# Patient Record
Sex: FEMALE | Race: WHITE | Employment: OTHER | URBAN - METROPOLITAN AREA
[De-identification: names, ages, dates, MRNs, and addresses within clinical notes are randomized per-mention and may not be internally consistent; named-entity substitution may affect disease eponyms.]

---

## 2017-01-19 ENCOUNTER — TELEPHONE (OUTPATIENT)
Dept: FAMILY MEDICINE CLINIC | Age: 72
End: 2017-01-19

## 2017-01-19 DIAGNOSIS — E11.9 CONTROLLED TYPE 2 DIABETES MELLITUS WITHOUT COMPLICATION, WITHOUT LONG-TERM CURRENT USE OF INSULIN (HCC): ICD-10-CM

## 2017-02-24 ENCOUNTER — TELEPHONE (OUTPATIENT)
Dept: FAMILY MEDICINE CLINIC | Age: 72
End: 2017-02-24

## 2017-02-24 RX ORDER — METHYLPREDNISOLONE 4 MG/1
TABLET ORAL
Qty: 1 KIT | Refills: 0 | Status: SHIPPED | OUTPATIENT
Start: 2017-02-24 | End: 2017-03-02

## 2017-03-09 DIAGNOSIS — E11.9 CONTROLLED TYPE 2 DIABETES MELLITUS WITHOUT COMPLICATION, WITHOUT LONG-TERM CURRENT USE OF INSULIN (HCC): ICD-10-CM

## 2017-03-09 RX ORDER — MELOXICAM 15 MG/1
TABLET ORAL
Qty: 90 TABLET | Refills: 1 | Status: SHIPPED | OUTPATIENT
Start: 2017-03-09 | End: 2017-11-06 | Stop reason: SDUPTHER

## 2017-03-09 RX ORDER — MONTELUKAST SODIUM 10 MG/1
TABLET ORAL
Qty: 90 TABLET | Refills: 1 | Status: SHIPPED | OUTPATIENT
Start: 2017-03-09 | End: 2017-11-06 | Stop reason: SDUPTHER

## 2017-03-09 RX ORDER — CHLORTHALIDONE 25 MG/1
TABLET ORAL
Qty: 90 TABLET | Refills: 1 | Status: SHIPPED | OUTPATIENT
Start: 2017-03-09 | End: 2017-09-25

## 2017-03-09 RX ORDER — PRAVASTATIN SODIUM 10 MG
TABLET ORAL
Qty: 90 TABLET | Refills: 1 | Status: SHIPPED | OUTPATIENT
Start: 2017-03-09 | End: 2017-08-08 | Stop reason: SDUPTHER

## 2017-04-28 ENCOUNTER — TELEPHONE (OUTPATIENT)
Dept: FAMILY MEDICINE CLINIC | Age: 72
End: 2017-04-28

## 2017-04-28 DIAGNOSIS — E11.9 CONTROLLED TYPE 2 DIABETES MELLITUS WITHOUT COMPLICATION, WITHOUT LONG-TERM CURRENT USE OF INSULIN (HCC): Primary | ICD-10-CM

## 2017-04-28 DIAGNOSIS — I10 BENIGN ESSENTIAL HYPERTENSION: ICD-10-CM

## 2017-04-28 DIAGNOSIS — E78.00 PURE HYPERCHOLESTEROLEMIA: ICD-10-CM

## 2017-05-11 ENCOUNTER — TELEPHONE (OUTPATIENT)
Dept: FAMILY MEDICINE CLINIC | Age: 72
End: 2017-05-11

## 2017-05-11 DIAGNOSIS — E11.9 CONTROLLED TYPE 2 DIABETES MELLITUS WITHOUT COMPLICATION, WITHOUT LONG-TERM CURRENT USE OF INSULIN (HCC): ICD-10-CM

## 2017-05-15 ENCOUNTER — NURSE ONLY (OUTPATIENT)
Dept: FAMILY MEDICINE CLINIC | Age: 72
End: 2017-05-15

## 2017-05-15 DIAGNOSIS — E11.9 CONTROLLED TYPE 2 DIABETES MELLITUS WITHOUT COMPLICATION, WITHOUT LONG-TERM CURRENT USE OF INSULIN (HCC): ICD-10-CM

## 2017-05-15 DIAGNOSIS — I10 BENIGN ESSENTIAL HYPERTENSION: ICD-10-CM

## 2017-05-15 DIAGNOSIS — E78.00 PURE HYPERCHOLESTEROLEMIA: ICD-10-CM

## 2017-05-15 LAB
CHOLESTEROL, TOTAL: 196 MG/DL (ref 0–199)
GLUCOSE BLD-MCNC: 114 MG/DL (ref 70–99)
HCT VFR BLD CALC: 41.9 % (ref 36–48)
HDLC SERPL-MCNC: 70 MG/DL (ref 40–60)
HEMOGLOBIN: 13.9 G/DL (ref 12–16)
LDL CHOLESTEROL CALCULATED: 99 MG/DL
MCH RBC QN AUTO: 30.6 PG (ref 26–34)
MCHC RBC AUTO-ENTMCNC: 33.2 G/DL (ref 31–36)
MCV RBC AUTO: 92.4 FL (ref 80–100)
PDW BLD-RTO: 12.8 % (ref 12.4–15.4)
PLATELET # BLD: 265 K/UL (ref 135–450)
PMV BLD AUTO: 9.5 FL (ref 5–10.5)
RBC # BLD: 4.53 M/UL (ref 4–5.2)
TRIGL SERPL-MCNC: 137 MG/DL (ref 0–150)
VITAMIN B-12: 291 PG/ML (ref 211–911)
VLDLC SERPL CALC-MCNC: 27 MG/DL
WBC # BLD: 5.2 K/UL (ref 4–11)

## 2017-05-15 PROCEDURE — 36415 COLL VENOUS BLD VENIPUNCTURE: CPT | Performed by: FAMILY MEDICINE

## 2017-05-16 LAB
ESTIMATED AVERAGE GLUCOSE: 137 MG/DL
HBA1C MFR BLD: 6.4 %

## 2017-05-23 ENCOUNTER — OFFICE VISIT (OUTPATIENT)
Dept: FAMILY MEDICINE CLINIC | Age: 72
End: 2017-05-23

## 2017-05-23 VITALS
DIASTOLIC BLOOD PRESSURE: 72 MMHG | TEMPERATURE: 98.1 F | BODY MASS INDEX: 25.81 KG/M2 | SYSTOLIC BLOOD PRESSURE: 134 MMHG | WEIGHT: 151.2 LBS | OXYGEN SATURATION: 98 % | HEIGHT: 64 IN | HEART RATE: 77 BPM

## 2017-05-23 DIAGNOSIS — K40.90 RIGHT INGUINAL HERNIA: ICD-10-CM

## 2017-05-23 DIAGNOSIS — R19.7 DIARRHEA, UNSPECIFIED TYPE: ICD-10-CM

## 2017-05-23 DIAGNOSIS — I10 BENIGN ESSENTIAL HYPERTENSION: ICD-10-CM

## 2017-05-23 DIAGNOSIS — K21.9 GASTROESOPHAGEAL REFLUX DISEASE WITHOUT ESOPHAGITIS: ICD-10-CM

## 2017-05-23 DIAGNOSIS — E11.9 CONTROLLED TYPE 2 DIABETES MELLITUS WITHOUT COMPLICATION, WITHOUT LONG-TERM CURRENT USE OF INSULIN (HCC): Primary | ICD-10-CM

## 2017-05-23 DIAGNOSIS — K58.0 IRRITABLE BOWEL SYNDROME WITH DIARRHEA: ICD-10-CM

## 2017-05-23 DIAGNOSIS — Z12.11 COLON CANCER SCREENING: ICD-10-CM

## 2017-05-23 DIAGNOSIS — R42 VERTIGO: ICD-10-CM

## 2017-05-23 LAB
CREATININE URINE POCT: 50
MICROALBUMIN/CREAT 24H UR: 10 MG/G{CREAT}
MICROALBUMIN/CREAT UR-RTO: NORMAL

## 2017-05-23 PROCEDURE — 1036F TOBACCO NON-USER: CPT | Performed by: FAMILY MEDICINE

## 2017-05-23 PROCEDURE — 1090F PRES/ABSN URINE INCON ASSESS: CPT | Performed by: FAMILY MEDICINE

## 2017-05-23 PROCEDURE — 3044F HG A1C LEVEL LT 7.0%: CPT | Performed by: FAMILY MEDICINE

## 2017-05-23 PROCEDURE — 1123F ACP DISCUSS/DSCN MKR DOCD: CPT | Performed by: FAMILY MEDICINE

## 2017-05-23 PROCEDURE — G8399 PT W/DXA RESULTS DOCUMENT: HCPCS | Performed by: FAMILY MEDICINE

## 2017-05-23 PROCEDURE — 82044 UR ALBUMIN SEMIQUANTITATIVE: CPT | Performed by: FAMILY MEDICINE

## 2017-05-23 PROCEDURE — 3014F SCREEN MAMMO DOC REV: CPT | Performed by: FAMILY MEDICINE

## 2017-05-23 PROCEDURE — G8427 DOCREV CUR MEDS BY ELIG CLIN: HCPCS | Performed by: FAMILY MEDICINE

## 2017-05-23 PROCEDURE — 4040F PNEUMOC VAC/ADMIN/RCVD: CPT | Performed by: FAMILY MEDICINE

## 2017-05-23 PROCEDURE — 3017F COLORECTAL CA SCREEN DOC REV: CPT | Performed by: FAMILY MEDICINE

## 2017-05-23 PROCEDURE — G8420 CALC BMI NORM PARAMETERS: HCPCS | Performed by: FAMILY MEDICINE

## 2017-05-23 PROCEDURE — 99214 OFFICE O/P EST MOD 30 MIN: CPT | Performed by: FAMILY MEDICINE

## 2017-05-23 RX ORDER — METFORMIN HYDROCHLORIDE 500 MG/1
500 TABLET, EXTENDED RELEASE ORAL 2 TIMES DAILY
Qty: 60 TABLET | Refills: 5 | Status: SHIPPED | OUTPATIENT
Start: 2017-05-23 | End: 2017-06-06 | Stop reason: SDUPTHER

## 2017-06-06 RX ORDER — METFORMIN HYDROCHLORIDE 500 MG/1
500 TABLET, EXTENDED RELEASE ORAL 2 TIMES DAILY
Qty: 180 TABLET | Refills: 3 | Status: SHIPPED | OUTPATIENT
Start: 2017-06-06 | End: 2018-02-23 | Stop reason: SDUPTHER

## 2017-06-07 ENCOUNTER — TELEPHONE (OUTPATIENT)
Dept: FAMILY MEDICINE CLINIC | Age: 72
End: 2017-06-07

## 2017-06-08 ENCOUNTER — INITIAL CONSULT (OUTPATIENT)
Dept: SURGERY | Age: 72
End: 2017-06-08

## 2017-06-08 VITALS
SYSTOLIC BLOOD PRESSURE: 126 MMHG | WEIGHT: 151 LBS | BODY MASS INDEX: 25.78 KG/M2 | HEIGHT: 64 IN | DIASTOLIC BLOOD PRESSURE: 70 MMHG

## 2017-06-08 DIAGNOSIS — K40.90 RIGHT INGUINAL HERNIA: Primary | ICD-10-CM

## 2017-06-08 PROCEDURE — 1036F TOBACCO NON-USER: CPT | Performed by: SURGERY

## 2017-06-08 PROCEDURE — 4040F PNEUMOC VAC/ADMIN/RCVD: CPT | Performed by: SURGERY

## 2017-06-08 PROCEDURE — 3014F SCREEN MAMMO DOC REV: CPT | Performed by: SURGERY

## 2017-06-08 PROCEDURE — 1123F ACP DISCUSS/DSCN MKR DOCD: CPT | Performed by: SURGERY

## 2017-06-08 PROCEDURE — G8427 DOCREV CUR MEDS BY ELIG CLIN: HCPCS | Performed by: SURGERY

## 2017-06-08 PROCEDURE — 1090F PRES/ABSN URINE INCON ASSESS: CPT | Performed by: SURGERY

## 2017-06-08 PROCEDURE — 99202 OFFICE O/P NEW SF 15 MIN: CPT | Performed by: SURGERY

## 2017-06-08 PROCEDURE — G8399 PT W/DXA RESULTS DOCUMENT: HCPCS | Performed by: SURGERY

## 2017-06-08 PROCEDURE — 3017F COLORECTAL CA SCREEN DOC REV: CPT | Performed by: SURGERY

## 2017-06-08 PROCEDURE — G8420 CALC BMI NORM PARAMETERS: HCPCS | Performed by: SURGERY

## 2017-08-03 ENCOUNTER — HOSPITAL ENCOUNTER (OUTPATIENT)
Dept: OTHER | Age: 72
Discharge: HOME | End: 2017-08-03
Attending: INTERNAL MEDICINE | Admitting: INTERNAL MEDICINE

## 2017-08-03 VITALS
WEIGHT: 148 LBS | OXYGEN SATURATION: 96 % | RESPIRATION RATE: 14 BRPM | TEMPERATURE: 97.2 F | BODY MASS INDEX: 26.22 KG/M2 | HEIGHT: 63 IN | HEART RATE: 60 BPM | DIASTOLIC BLOOD PRESSURE: 63 MMHG | SYSTOLIC BLOOD PRESSURE: 124 MMHG

## 2017-08-03 LAB
GLUCOSE BLD-MCNC: 120 MG/DL (ref 70–99)
PERFORMED ON: ABNORMAL

## 2017-08-03 RX ORDER — SODIUM CHLORIDE, SODIUM LACTATE, POTASSIUM CHLORIDE, CALCIUM CHLORIDE 600; 310; 30; 20 MG/100ML; MG/100ML; MG/100ML; MG/100ML
INJECTION, SOLUTION INTRAVENOUS CONTINUOUS
Status: DISCONTINUED | OUTPATIENT
Start: 2017-08-03 | End: 2017-08-04 | Stop reason: HOSPADM

## 2017-08-03 RX ADMIN — SODIUM CHLORIDE, SODIUM LACTATE, POTASSIUM CHLORIDE, CALCIUM CHLORIDE: 600; 310; 30; 20 INJECTION, SOLUTION INTRAVENOUS at 06:49

## 2017-08-08 RX ORDER — PRAVASTATIN SODIUM 10 MG
TABLET ORAL
Qty: 90 TABLET | Refills: 3 | Status: SHIPPED | OUTPATIENT
Start: 2017-08-08 | End: 2017-10-05 | Stop reason: SDUPTHER

## 2017-09-12 ENCOUNTER — TELEPHONE (OUTPATIENT)
Dept: FAMILY MEDICINE CLINIC | Age: 72
End: 2017-09-12

## 2017-09-13 ENCOUNTER — NURSE ONLY (OUTPATIENT)
Dept: FAMILY MEDICINE CLINIC | Age: 72
End: 2017-09-13

## 2017-09-13 DIAGNOSIS — E11.9 CONTROLLED TYPE 2 DIABETES MELLITUS WITHOUT COMPLICATION, WITHOUT LONG-TERM CURRENT USE OF INSULIN (HCC): ICD-10-CM

## 2017-09-13 LAB
A/G RATIO: 2.1 (ref 1.1–2.2)
ALBUMIN SERPL-MCNC: 4.5 G/DL (ref 3.4–5)
ALP BLD-CCNC: 79 U/L (ref 40–129)
ALT SERPL-CCNC: 11 U/L (ref 10–40)
ANION GAP SERPL CALCULATED.3IONS-SCNC: 18 MMOL/L (ref 3–16)
AST SERPL-CCNC: 12 U/L (ref 15–37)
BASOPHILS ABSOLUTE: 0.1 K/UL (ref 0–0.2)
BASOPHILS RELATIVE PERCENT: 1.2 %
BILIRUB SERPL-MCNC: 0.4 MG/DL (ref 0–1)
BUN BLDV-MCNC: 22 MG/DL (ref 7–20)
CALCIUM SERPL-MCNC: 9.7 MG/DL (ref 8.3–10.6)
CHLORIDE BLD-SCNC: 100 MMOL/L (ref 99–110)
CO2: 24 MMOL/L (ref 21–32)
CREAT SERPL-MCNC: 0.6 MG/DL (ref 0.6–1.2)
EOSINOPHILS ABSOLUTE: 0.2 K/UL (ref 0–0.6)
EOSINOPHILS RELATIVE PERCENT: 3 %
GFR AFRICAN AMERICAN: >60
GFR NON-AFRICAN AMERICAN: >60
GLOBULIN: 2.1 G/DL
GLUCOSE BLD-MCNC: 115 MG/DL (ref 70–99)
HCT VFR BLD CALC: 42.2 % (ref 36–48)
HEMOGLOBIN: 14 G/DL (ref 12–16)
LYMPHOCYTES ABSOLUTE: 1.5 K/UL (ref 1–5.1)
LYMPHOCYTES RELATIVE PERCENT: 28.5 %
MCH RBC QN AUTO: 31.2 PG (ref 26–34)
MCHC RBC AUTO-ENTMCNC: 33.3 G/DL (ref 31–36)
MCV RBC AUTO: 93.8 FL (ref 80–100)
MONOCYTES ABSOLUTE: 0.4 K/UL (ref 0–1.3)
MONOCYTES RELATIVE PERCENT: 7.6 %
NEUTROPHILS ABSOLUTE: 3.1 K/UL (ref 1.7–7.7)
NEUTROPHILS RELATIVE PERCENT: 59.7 %
PDW BLD-RTO: 13.4 % (ref 12.4–15.4)
PLATELET # BLD: 258 K/UL (ref 135–450)
PMV BLD AUTO: 9.5 FL (ref 5–10.5)
POTASSIUM SERPL-SCNC: 4.5 MMOL/L (ref 3.5–5.1)
RBC # BLD: 4.5 M/UL (ref 4–5.2)
SODIUM BLD-SCNC: 142 MMOL/L (ref 136–145)
TOTAL PROTEIN: 6.6 G/DL (ref 6.4–8.2)
VITAMIN B-12: 302 PG/ML (ref 211–911)
WBC # BLD: 5.1 K/UL (ref 4–11)

## 2017-09-13 PROCEDURE — 36415 COLL VENOUS BLD VENIPUNCTURE: CPT | Performed by: FAMILY MEDICINE

## 2017-09-14 LAB
ESTIMATED AVERAGE GLUCOSE: 128.4 MG/DL
HBA1C MFR BLD: 6.1 %

## 2017-09-20 ENCOUNTER — OFFICE VISIT (OUTPATIENT)
Dept: FAMILY MEDICINE CLINIC | Age: 72
End: 2017-09-20

## 2017-09-20 VITALS
HEART RATE: 66 BPM | SYSTOLIC BLOOD PRESSURE: 120 MMHG | BODY MASS INDEX: 27.14 KG/M2 | TEMPERATURE: 98 F | DIASTOLIC BLOOD PRESSURE: 74 MMHG | WEIGHT: 153.2 LBS | OXYGEN SATURATION: 98 %

## 2017-09-20 DIAGNOSIS — K21.9 GASTROESOPHAGEAL REFLUX DISEASE WITHOUT ESOPHAGITIS: ICD-10-CM

## 2017-09-20 DIAGNOSIS — M15.9 PRIMARY OSTEOARTHRITIS INVOLVING MULTIPLE JOINTS: ICD-10-CM

## 2017-09-20 DIAGNOSIS — E11.9 CONTROLLED TYPE 2 DIABETES MELLITUS WITHOUT COMPLICATION, WITHOUT LONG-TERM CURRENT USE OF INSULIN (HCC): Primary | ICD-10-CM

## 2017-09-20 DIAGNOSIS — I10 BENIGN ESSENTIAL HYPERTENSION: ICD-10-CM

## 2017-09-20 DIAGNOSIS — K58.0 IRRITABLE BOWEL SYNDROME WITH DIARRHEA: ICD-10-CM

## 2017-09-20 PROCEDURE — 1123F ACP DISCUSS/DSCN MKR DOCD: CPT | Performed by: FAMILY MEDICINE

## 2017-09-20 PROCEDURE — 93000 ELECTROCARDIOGRAM COMPLETE: CPT | Performed by: FAMILY MEDICINE

## 2017-09-20 PROCEDURE — 3017F COLORECTAL CA SCREEN DOC REV: CPT | Performed by: FAMILY MEDICINE

## 2017-09-20 PROCEDURE — G8427 DOCREV CUR MEDS BY ELIG CLIN: HCPCS | Performed by: FAMILY MEDICINE

## 2017-09-20 PROCEDURE — 3046F HEMOGLOBIN A1C LEVEL >9.0%: CPT | Performed by: FAMILY MEDICINE

## 2017-09-20 PROCEDURE — 3014F SCREEN MAMMO DOC REV: CPT | Performed by: FAMILY MEDICINE

## 2017-09-20 PROCEDURE — 99215 OFFICE O/P EST HI 40 MIN: CPT | Performed by: FAMILY MEDICINE

## 2017-09-20 PROCEDURE — 4040F PNEUMOC VAC/ADMIN/RCVD: CPT | Performed by: FAMILY MEDICINE

## 2017-09-20 PROCEDURE — G8399 PT W/DXA RESULTS DOCUMENT: HCPCS | Performed by: FAMILY MEDICINE

## 2017-09-20 PROCEDURE — G8417 CALC BMI ABV UP PARAM F/U: HCPCS | Performed by: FAMILY MEDICINE

## 2017-09-20 PROCEDURE — 1036F TOBACCO NON-USER: CPT | Performed by: FAMILY MEDICINE

## 2017-09-20 PROCEDURE — 1090F PRES/ABSN URINE INCON ASSESS: CPT | Performed by: FAMILY MEDICINE

## 2017-09-25 RX ORDER — BUDESONIDE AND FORMOTEROL FUMARATE DIHYDRATE 160; 4.5 UG/1; UG/1
AEROSOL RESPIRATORY (INHALATION)
Qty: 30.6 G | Refills: 1 | Status: SHIPPED | OUTPATIENT
Start: 2017-09-25 | End: 2018-10-30 | Stop reason: SDUPTHER

## 2017-09-25 RX ORDER — CHLORTHALIDONE 25 MG/1
TABLET ORAL
Qty: 90 TABLET | Refills: 1 | Status: SHIPPED | OUTPATIENT
Start: 2017-09-25 | End: 2018-04-13

## 2017-10-06 RX ORDER — PRAVASTATIN SODIUM 10 MG
TABLET ORAL
Qty: 90 TABLET | Refills: 0 | Status: SHIPPED | OUTPATIENT
Start: 2017-10-06 | End: 2018-07-09 | Stop reason: SDUPTHER

## 2017-10-10 ENCOUNTER — SURG/PROC ORDERS (OUTPATIENT)
Dept: SURGERY | Age: 72
End: 2017-10-10

## 2017-10-10 RX ORDER — SODIUM CHLORIDE 0.9 % (FLUSH) 0.9 %
10 SYRINGE (ML) INJECTION PRN
Status: CANCELLED | OUTPATIENT
Start: 2017-10-10

## 2017-10-10 RX ORDER — HEPARIN SODIUM 5000 [USP'U]/ML
5000 INJECTION, SOLUTION INTRAVENOUS; SUBCUTANEOUS ONCE
Status: CANCELLED | OUTPATIENT
Start: 2017-10-10

## 2017-10-10 RX ORDER — SODIUM CHLORIDE 0.9 % (FLUSH) 0.9 %
10 SYRINGE (ML) INJECTION EVERY 12 HOURS SCHEDULED
Status: CANCELLED | OUTPATIENT
Start: 2017-10-10

## 2017-10-17 ENCOUNTER — HOSPITAL ENCOUNTER (OUTPATIENT)
Dept: SURGERY | Age: 72
Discharge: HOME | End: 2017-10-17
Attending: SURGERY | Admitting: SURGERY

## 2017-10-17 VITALS
WEIGHT: 150 LBS | TEMPERATURE: 97.8 F | OXYGEN SATURATION: 96 % | DIASTOLIC BLOOD PRESSURE: 60 MMHG | RESPIRATION RATE: 12 BRPM | BODY MASS INDEX: 26.58 KG/M2 | SYSTOLIC BLOOD PRESSURE: 124 MMHG | HEIGHT: 63 IN | HEART RATE: 65 BPM

## 2017-10-17 LAB
GLUCOSE BLD-MCNC: 113 MG/DL (ref 70–99)
PERFORMED ON: ABNORMAL

## 2017-10-17 PROCEDURE — 49650 LAP ING HERNIA REPAIR INIT: CPT | Performed by: SURGERY

## 2017-10-17 RX ORDER — LIDOCAINE HYDROCHLORIDE 10 MG/ML
1 INJECTION, SOLUTION EPIDURAL; INFILTRATION; INTRACAUDAL; PERINEURAL
Status: COMPLETED | OUTPATIENT
Start: 2017-10-17 | End: 2017-10-17

## 2017-10-17 RX ORDER — DIPHENHYDRAMINE HYDROCHLORIDE 50 MG/ML
12.5 INJECTION INTRAMUSCULAR; INTRAVENOUS
Status: ACTIVE | OUTPATIENT
Start: 2017-10-17 | End: 2017-10-17

## 2017-10-17 RX ORDER — HYDRALAZINE HYDROCHLORIDE 20 MG/ML
5 INJECTION INTRAMUSCULAR; INTRAVENOUS EVERY 10 MIN PRN
Status: DISCONTINUED | OUTPATIENT
Start: 2017-10-17 | End: 2017-10-18 | Stop reason: HOSPADM

## 2017-10-17 RX ORDER — HYDROMORPHONE HCL 110MG/55ML
0.25 PATIENT CONTROLLED ANALGESIA SYRINGE INTRAVENOUS EVERY 5 MIN PRN
Status: DISCONTINUED | OUTPATIENT
Start: 2017-10-17 | End: 2017-10-18 | Stop reason: HOSPADM

## 2017-10-17 RX ORDER — LABETALOL HYDROCHLORIDE 5 MG/ML
5 INJECTION, SOLUTION INTRAVENOUS EVERY 10 MIN PRN
Status: DISCONTINUED | OUTPATIENT
Start: 2017-10-17 | End: 2017-10-18 | Stop reason: HOSPADM

## 2017-10-17 RX ORDER — SODIUM CHLORIDE 0.9 % (FLUSH) 0.9 %
10 SYRINGE (ML) INJECTION PRN
Status: DISCONTINUED | OUTPATIENT
Start: 2017-10-17 | End: 2017-10-18 | Stop reason: HOSPADM

## 2017-10-17 RX ORDER — HYDROCODONE BITARTRATE AND ACETAMINOPHEN 5; 325 MG/1; MG/1
1-2 TABLET ORAL EVERY 6 HOURS PRN
Qty: 30 TABLET | Refills: 0 | Status: SHIPPED | OUTPATIENT
Start: 2017-10-17 | End: 2017-10-24

## 2017-10-17 RX ORDER — FENTANYL CITRATE 50 UG/ML
25 INJECTION, SOLUTION INTRAMUSCULAR; INTRAVENOUS EVERY 5 MIN PRN
Status: DISCONTINUED | OUTPATIENT
Start: 2017-10-17 | End: 2017-10-18 | Stop reason: HOSPADM

## 2017-10-17 RX ORDER — ONDANSETRON 2 MG/ML
4 INJECTION INTRAMUSCULAR; INTRAVENOUS
Status: COMPLETED | OUTPATIENT
Start: 2017-10-17 | End: 2017-10-17

## 2017-10-17 RX ORDER — SODIUM CHLORIDE 0.9 % (FLUSH) 0.9 %
10 SYRINGE (ML) INJECTION EVERY 12 HOURS SCHEDULED
Status: DISCONTINUED | OUTPATIENT
Start: 2017-10-17 | End: 2017-10-18 | Stop reason: HOSPADM

## 2017-10-17 RX ORDER — MEPERIDINE HYDROCHLORIDE 25 MG/ML
12.5 INJECTION INTRAMUSCULAR; INTRAVENOUS; SUBCUTANEOUS EVERY 5 MIN PRN
Status: DISCONTINUED | OUTPATIENT
Start: 2017-10-17 | End: 2017-10-18 | Stop reason: HOSPADM

## 2017-10-17 RX ORDER — SODIUM CHLORIDE, SODIUM LACTATE, POTASSIUM CHLORIDE, CALCIUM CHLORIDE 600; 310; 30; 20 MG/100ML; MG/100ML; MG/100ML; MG/100ML
INJECTION, SOLUTION INTRAVENOUS CONTINUOUS
Status: DISCONTINUED | OUTPATIENT
Start: 2017-10-17 | End: 2017-10-18 | Stop reason: HOSPADM

## 2017-10-17 RX ORDER — HEPARIN SODIUM 5000 [USP'U]/ML
INJECTION, SOLUTION INTRAVENOUS; SUBCUTANEOUS
Status: DISPENSED
Start: 2017-10-17 | End: 2017-10-17

## 2017-10-17 RX ORDER — OXYCODONE HYDROCHLORIDE AND ACETAMINOPHEN 5; 325 MG/1; MG/1
1 TABLET ORAL ONCE
Status: COMPLETED | OUTPATIENT
Start: 2017-10-17 | End: 2017-10-17

## 2017-10-17 RX ORDER — LIDOCAINE HYDROCHLORIDE 10 MG/ML
INJECTION, SOLUTION EPIDURAL; INFILTRATION; INTRACAUDAL; PERINEURAL
Status: DISPENSED
Start: 2017-10-17 | End: 2017-10-17

## 2017-10-17 RX ORDER — HYDROMORPHONE HCL 110MG/55ML
0.5 PATIENT CONTROLLED ANALGESIA SYRINGE INTRAVENOUS EVERY 5 MIN PRN
Status: DISCONTINUED | OUTPATIENT
Start: 2017-10-17 | End: 2017-10-18 | Stop reason: HOSPADM

## 2017-10-17 RX ORDER — SODIUM CHLORIDE, SODIUM LACTATE, POTASSIUM CHLORIDE, CALCIUM CHLORIDE 600; 310; 30; 20 MG/100ML; MG/100ML; MG/100ML; MG/100ML
INJECTION, SOLUTION INTRAVENOUS
Status: DISPENSED
Start: 2017-10-17 | End: 2017-10-17

## 2017-10-17 RX ORDER — PROMETHAZINE HYDROCHLORIDE 25 MG/ML
6.25 INJECTION, SOLUTION INTRAMUSCULAR; INTRAVENOUS
Status: ACTIVE | OUTPATIENT
Start: 2017-10-17 | End: 2017-10-17

## 2017-10-17 RX ORDER — HEPARIN SODIUM 5000 [USP'U]/ML
5000 INJECTION, SOLUTION INTRAVENOUS; SUBCUTANEOUS ONCE
Status: COMPLETED | OUTPATIENT
Start: 2017-10-17 | End: 2017-10-17

## 2017-10-17 RX ORDER — DEXTROSE MONOHYDRATE 50 MG/ML
INJECTION, SOLUTION INTRAVENOUS
Status: DISPENSED
Start: 2017-10-17 | End: 2017-10-17

## 2017-10-17 RX ADMIN — Medication 0.5 MG: at 13:54

## 2017-10-17 RX ADMIN — LIDOCAINE HYDROCHLORIDE 0.1 ML: 10 INJECTION, SOLUTION EPIDURAL; INFILTRATION; INTRACAUDAL; PERINEURAL at 11:45

## 2017-10-17 RX ADMIN — OXYCODONE HYDROCHLORIDE AND ACETAMINOPHEN 1 TABLET: 5; 325 TABLET ORAL at 14:52

## 2017-10-17 RX ADMIN — ONDANSETRON 4 MG: 2 INJECTION INTRAMUSCULAR; INTRAVENOUS at 13:54

## 2017-10-17 RX ADMIN — Medication 0.5 MG: at 14:06

## 2017-10-17 RX ADMIN — HEPARIN SODIUM 5000 UNITS: 5000 INJECTION, SOLUTION INTRAVENOUS; SUBCUTANEOUS at 11:43

## 2017-10-17 RX ADMIN — Medication 0.5 MG: at 13:59

## 2017-10-17 ASSESSMENT — PAIN DESCRIPTION - DESCRIPTORS
DESCRIPTORS: DISCOMFORT
DESCRIPTORS: SORE
DESCRIPTORS: SORE
DESCRIPTORS: DISCOMFORT
DESCRIPTORS: SORE
DESCRIPTORS: DISCOMFORT

## 2017-10-17 ASSESSMENT — PAIN SCALES - GENERAL
PAINLEVEL_OUTOF10: 6
PAINLEVEL_OUTOF10: 8
PAINLEVEL_OUTOF10: 7
PAINLEVEL_OUTOF10: 9
PAINLEVEL_OUTOF10: 4
PAINLEVEL_OUTOF10: 5

## 2017-10-17 ASSESSMENT — PAIN DESCRIPTION - PAIN TYPE
TYPE: SURGICAL PAIN

## 2017-10-17 ASSESSMENT — PAIN DESCRIPTION - LOCATION
LOCATION: ABDOMEN

## 2017-10-17 ASSESSMENT — PAIN - FUNCTIONAL ASSESSMENT: PAIN_FUNCTIONAL_ASSESSMENT: 0-10

## 2017-10-17 NOTE — ANESTHESIA POST-OP
ANESTHESIA POST-OPERATIVE NOTE    Patient Name: eHver Reese  YOB: 1945  Medical Record Number: [id number]  Account Number: [de-identified]    Date of Surgery: 10/17/2017    Preoperative Diagnosis: RIGHT INGUINAL HERNIA  Surgical Procedure: robotic right inguinal hernia repair  Surgeon: Renetta Sierra MD    Anesthesia type: general  Patient location: PACU    Vitals:    10/17/17 1341 10/17/17 1346 10/17/17 1351 10/17/17 1406   BP: 122/61 (!) 123/53 119/77 (!) 120/52   Pulse: 75 74 71 68   Resp: 15 11 13 13   Temp:       TempSrc:       SpO2: 95% 98% 95% 97%   Weight:       Height:            Post-op vital signs: stable  Please refer to nursing notes for full set of vitals while in PACU. Level of consciousness: awake, alert and oriented  Post-op assessment: no apparent anesthetic complications, tolerated procedure well and no evidence of recall    Cardiovascular System Stable: yes  Respiratory System: Airway Patent yes  Ventilator and/or ETT: no    Post-op pain: adequate analgesia  Post-op Nausea & Vomiting: no nausea and no vomiting  Post-op Hydration: euvolemic    The patient is on the ventilator and/or sedated therefore unable participate in the post-operative evaluation: no    The patient has a nerve block in place and full recovery from regional anesthesia has not occurred. Patient is not expected to recover within 48 hours of this evaluation. However, he/she is otherwise able to participate in this evaluation: no    Jose Phase I & II: please see nursing notes for this information.     Complications: none  Comments: none    Electronically signed by Eugene López MD on 10/17/17 at 2:28 PM

## 2017-10-17 NOTE — H&P
I have reviewed the history and physical dated September/20/ 2017 and examined the patient and find no relevant changes. I have reviewed with the patient and/or family the risks, benefits, and alternatives to the procedure.

## 2017-10-17 NOTE — PLAN OF CARE
Pre-Operative:  1. Patient/Caregiver identifies - states name and date of birth. 2.  The patient is free from signs and symptoms of injury. 3.  The patient receives appropriate medication(s), safely administered during the Perioperative period. 4.  The patient is free from signs and symptoms of infection. 5.  The patient has wound / tissue perfusion. 6.  The patients's fluid, electrolyte, and acid-base balances are established preoperatively. 7.  The patient's pulmonary function is established preoperatively. 8.  The patient's cardiovascular status is established preoperatively. 9.  The patient / caregiver demonstrates knowledge of nutritional management related to the operative or other invasive procedure. 10. The patient/caregiver demonstrates knowledge of medication management. 11. The patient/caregiver demonstrates knowledge of pain management. 12.  The patient participates in the rehabilitation process as applicable. 13.  The patient/caregiver participates in decisions affection his or her Perioperative plan of care. 14.  The patient's care is consistent with the individualized Perioperative plan of care. 15.  The patient's right to privacy is maintained. 16.  The patient is the recipient of competent and ethical care within legal standards of practice. 17.  The patient's value system, lifestyle, ethnicity, and culture are considered, respected, and incorporated in the Perioperative plan of care and understands special services available. 18.  The patient demonstrates and/or reports adequate pain control throughout the the Perioperative period. 19. The patient's neurological status is established preoperatively. 20. The patient/caregiver demonstrates knowledge of the expected responses to the operative or invasive procedure. 21.  Patient/Caregiver has reduced anxiety. Interventions- Familiarize with environment and equipment.   22. Patient/Caregiver verbalizes understanding of Phase I and Phase II process. 23.  Patient pain level is established preoperatively using age appropriate pain scale. 24.  The patient will move to fall risk upon sedation- during and through the recovery phase. Interventions- orient the patient to the environment, especially the location of the bathroom; provide treaded socks/non-skid footwear; demonstrate and teach back use of the nurse's call system; instruct the patient to call for help before getting out of bed; lock all movable equipment before transferring patient; keep bed in lowest position possible.  25.  Other:

## 2017-10-17 NOTE — ANESTHESIA PRE-OP
ANESTHESIA PRE-OPERATIVE EVALUATION    Patient Name: Star Horan YOB: 1945   Sex: Female Age: 81 yrs     Medical Record Number: [id number] Acct Number: [de-identified]     Date of Procedure: 10/17/2017 Time of Procedure: 1200     Preoperative Diagnosis: RIGHT INGUINAL HERNIA   Procedure: robotic right inguinal hernia repair   Surgeon: Bhargav Tran MD     Allergies: Allergies   Allergen Reactions    Flagyl [Metronidazole]      hives    Daypro [Oxaprozin]     Sulfa Antibiotics     Tramadol      drowsy    Amlodipine      constipation    Lisinopril      Cough        NPO: >8 hours  IV Access:  PIV  Isolation: No active isolations       Vitals:    10/17/17 1038   BP: (!) 158/74   Pulse: 66   Resp: 20   Temp: 99 °F (37.2 °C)   SpO2: 97%      Height: Height: 5' 3\" (160 cm) Weight: Weight: 150 lb (68 kg) BMI: Body mass index is 26.57 kg/m². Basic Metabolic Profile  Lab Results   Component Value Date     09/13/2017    K 4.5 09/13/2017     09/13/2017    CO2 24 09/13/2017    BUN 22 09/13/2017    CREATININE 0.6 09/13/2017    GLUCOSE 115 09/13/2017       Complete Blood Count  Lab Results   Component Value Date    WBC 5.1 09/13/2017    HGB 14.0 09/13/2017    HGB 13.9 05/15/2017    HCT 42.2 09/13/2017    HCT 41.9 05/15/2017     09/13/2017       EKG: Sinus bradycardia    Home Medications:  Patient's Medications   New Prescriptions    No medications on file   Previous Medications    ACETAMINOPHEN (TYLENOL PO)    Take  by mouth.     ALBUTEROL (ACCUNEB) 1.25 MG/3ML NEBULIZER SOLUTION    Inhale 3 mLs into the lungs every 4 hours as needed for Wheezing or Shortness of Breath DX: J45.20    ALBUTEROL (PROVENTIL HFA) 108 (90 BASE) MCG/ACT INHALER    Inhale 1-2 puffs into the lungs every 4 hours as needed for Wheezing or Shortness of Breath    CHLORTHALIDONE (HYGROTON) 25 MG TABLET    TAKE 1 TABLET BY MOUTH  DAILY    DICYCLOMINE (BENTYL) 10 MG CAPSULE    Take 1-2 capsules by mouth 3 times daily (before meals)    FLUTICASONE (FLONASE) 50 MCG/ACT NASAL SPRAY    1-2 sprays by Nasal route nightly. MELOXICAM (MOBIC) 15 MG TABLET    Take 1 tablet by mouth  daily    METFORMIN (GLUCOPHAGE XR) 500 MG EXTENDED RELEASE TABLET    Take 1 tablet by mouth 2 times daily    MONTELUKAST (SINGULAIR) 10 MG TABLET    Take 1 tablet by mouth  nightly    PRAVASTATIN (PRAVACHOL) 10 MG TABLET    TAKE 1 TABLET DAILY    RESPIRATORY THERAPY SUPPLIES (NEBULIZER) JOVITA    Use as directed DX: J45.20    SPACER/AERO-HOLDING CHAMBERS JOVITA    by Does not apply route. SYMBICORT 160-4.5 MCG/ACT AERO    USE 2 PUFFS TWO TIMES DAILY    VITAMIN D (CHOLECALCIFEROL) 1000 UNITS CAPS CAPSULE    Take 2,000 Units by mouth daily. Modified Medications    No medications on file   Discontinued Medications    ASPIRIN 81 MG TABLET    Take 1 tablet by mouth daily    TRIAMCINOLONE (KENALOG) 0.1 % CREAM    Apply twice a day as needed. Do not use on the face.      Medications taken on the morning of surgery: symbicort    Past Medical History  Past Medical History:   Diagnosis Date    Allergic rhinoconjunctivitis     Benign essential hypertension     Chest pain NEC 7/04    negative GXT/cardiolite    Depression     1997    GERD (gastroesophageal reflux disease)     Hyperglycemia     Hyperlipidemia     --HDL , ratio 2.7    IBS (irritable bowel syndrome)     Mild intermittent asthma     Osteoarthritis     Osteopenia     Routine health maintenance     GYN--TAC(6/07)    Vitamin D deficiency        Active Problem List  Patient Active Problem List    Diagnosis Date Noted    Gastroesophageal reflux disease without esophagitis 10/12/2015     Priority: High    Moderate intermittent asthma without complication 42/00/7453     Priority: High; used inhaler this AM    Benign essential hypertension      Priority: High    Controlled type 2 diabetes mellitus without complication, without long-term current use of insulin (HCC)      Priority: Medium Extremities: no peripheral edema   ______________________________________________________________________________________  Anesthetic Plan     ASA Status: 2 Anesthesia Type: general   Difficult Airway: No PONV History: No   Full Stomach: No Nerve Block: No   Code Status: Full Code Advanced Directives: Not Received   Emergent: No Notes: The anesthetic plan was discussed with the patient and/or patient representative. All questions were answered. Risks and possible complications were explained. Complications include, but are not limited to, sore throat, dental injury or damage, trauma to soft tissue, eye injury, corneal abrasion, adverse reaction to blood products and/or medications, bleeding, nerve injury, cardiac or respiratory arrest, and death. The patient and patients family understands these complications and are agreeable to the anesthetic plan. Nursing staff present in room during this discussion and consent. Informed consent obtained verbally from the patient.     Electronically signed by Prachi Kumar - 10/17/2017 at 11:56 AM

## 2017-10-17 NOTE — OP NOTE
[address]                [address] [id number]                               OPERATIVE REPORT    PATIENT NAME: Snow Mayfield                          :             1945  MED REC NO:   [id number]                           ROOM:  ACCOUNT NO:   [de-identified]                           ADMISSION DATE:  10/17/2017  PROVIDER:     Juan Barrera MD    DATE OF PROCEDURE:  10/17/2017    PREOPERATIVE DIAGNOSIS:  Right inguinal hernia. POSTOPERATIVE DIAGNOSIS:  Right inguinal hernia. PROCEDURE:  Robotic right inguinal hernia repair with mesh. SURGEON:  Juan Barrera MD    ANESTHESIA:  General.    INDICATIONS:  The patient is a 70-year-old woman who presents with a  right inguinal hernia. She presents for repair. OPERATIVE SUMMARY:  After preoperative evaluation, the patient was  brought in the operating suite and placed in a comfortable supine  position on the operating room table. Monitoring equipment was  attached and general anesthesia was induced. Her abdomen was  sterilely prepped and draped, and a small incision was made at the  superior aspect of the umbilicus. This was dissected down to the  fascia and a suture of 0 Ethibond was placed on either side of the  midline. The midline fascia was opened and the peritoneal cavity was  entered directly. A figure-of-eight suture of 0 Vicryl was placed  across the fascial defect for closure later. A Quoc trocar was  inserted and the abdomen was insufflated to a pressure of 15 mmHg. The remaining ports were placed under direct internal visualization. She was placed in Trendelenburg and the robot was docked. The hernia  was identified. She had few adhesions in the right lower quadrant and  these were taken down with electrocautery. The peritoneum was opened  and the preperitoneal space was entered. This was dissected down and  the epigastric vessels were identified.   We identified the

## 2017-10-17 NOTE — PROGRESS NOTES
Assessment unchanged. States pain is down to a 3. Tolerating po fluids. Instructions reviewed with patient and . Patient discharged to home with  via wheelchair.

## 2017-10-31 ENCOUNTER — OFFICE VISIT (OUTPATIENT)
Dept: SURGERY | Age: 72
End: 2017-10-31

## 2017-10-31 VITALS
BODY MASS INDEX: 26.93 KG/M2 | SYSTOLIC BLOOD PRESSURE: 120 MMHG | HEIGHT: 63 IN | WEIGHT: 152 LBS | DIASTOLIC BLOOD PRESSURE: 76 MMHG

## 2017-10-31 DIAGNOSIS — Z98.890 POST-OPERATIVE STATE: Primary | ICD-10-CM

## 2017-10-31 PROCEDURE — 99024 POSTOP FOLLOW-UP VISIT: CPT | Performed by: SURGERY

## 2017-10-31 NOTE — PROGRESS NOTES
[facility] GENERAL SURGERY      S:   Patient presents s/p robotic right inguinal hernia repair. She reports doing well. O:   Comfortable         Incision sites healing well. A:   S/P robotic right inguinal hernia repair    P:   Follow up as needed.

## 2017-11-07 RX ORDER — MELOXICAM 15 MG/1
TABLET ORAL
Qty: 90 TABLET | Refills: 1 | Status: SHIPPED | OUTPATIENT
Start: 2017-11-07 | End: 2018-02-23 | Stop reason: SDUPTHER

## 2017-11-07 RX ORDER — MONTELUKAST SODIUM 10 MG/1
TABLET ORAL
Qty: 90 TABLET | Refills: 1 | Status: SHIPPED | OUTPATIENT
Start: 2017-11-07 | End: 2018-02-23 | Stop reason: SDUPTHER

## 2017-11-30 ENCOUNTER — OFFICE VISIT (OUTPATIENT)
Dept: FAMILY MEDICINE CLINIC | Age: 72
End: 2017-11-30

## 2017-11-30 VITALS
HEART RATE: 72 BPM | BODY MASS INDEX: 27.5 KG/M2 | SYSTOLIC BLOOD PRESSURE: 136 MMHG | OXYGEN SATURATION: 98 % | DIASTOLIC BLOOD PRESSURE: 72 MMHG | WEIGHT: 155.2 LBS | TEMPERATURE: 98.2 F

## 2017-11-30 DIAGNOSIS — M15.9 PRIMARY OSTEOARTHRITIS INVOLVING MULTIPLE JOINTS: ICD-10-CM

## 2017-11-30 DIAGNOSIS — R19.7 DIARRHEA, UNSPECIFIED TYPE: Primary | ICD-10-CM

## 2017-11-30 PROCEDURE — 3017F COLORECTAL CA SCREEN DOC REV: CPT | Performed by: FAMILY MEDICINE

## 2017-11-30 PROCEDURE — 4040F PNEUMOC VAC/ADMIN/RCVD: CPT | Performed by: FAMILY MEDICINE

## 2017-11-30 PROCEDURE — 99213 OFFICE O/P EST LOW 20 MIN: CPT | Performed by: FAMILY MEDICINE

## 2017-11-30 PROCEDURE — 1036F TOBACCO NON-USER: CPT | Performed by: FAMILY MEDICINE

## 2017-11-30 PROCEDURE — 1123F ACP DISCUSS/DSCN MKR DOCD: CPT | Performed by: FAMILY MEDICINE

## 2017-11-30 PROCEDURE — G8484 FLU IMMUNIZE NO ADMIN: HCPCS | Performed by: FAMILY MEDICINE

## 2017-11-30 PROCEDURE — G8427 DOCREV CUR MEDS BY ELIG CLIN: HCPCS | Performed by: FAMILY MEDICINE

## 2017-11-30 PROCEDURE — 1090F PRES/ABSN URINE INCON ASSESS: CPT | Performed by: FAMILY MEDICINE

## 2017-11-30 PROCEDURE — G8399 PT W/DXA RESULTS DOCUMENT: HCPCS | Performed by: FAMILY MEDICINE

## 2017-11-30 PROCEDURE — 3014F SCREEN MAMMO DOC REV: CPT | Performed by: FAMILY MEDICINE

## 2017-11-30 PROCEDURE — G8417 CALC BMI ABV UP PARAM F/U: HCPCS | Performed by: FAMILY MEDICINE

## 2017-11-30 RX ORDER — AMITRIPTYLINE HYDROCHLORIDE 25 MG/1
25-75 TABLET, FILM COATED ORAL NIGHTLY
Qty: 60 TABLET | Refills: 3 | Status: SHIPPED | OUTPATIENT
Start: 2017-11-30 | End: 2019-05-01

## 2017-11-30 NOTE — PATIENT INSTRUCTIONS
Please compare this printed medication list with your medications at home to be sure they are the same. If you have any medications that are different please contact us immediately at [phone]. Also review your allergies that we have listed, these may also include medications that you have not been able to tolerate, make sure everything listed is correct. If you have any allergies that are different please contact us immediately at [phone].

## 2018-01-31 ENCOUNTER — TELEPHONE (OUTPATIENT)
Dept: FAMILY MEDICINE CLINIC | Age: 73
End: 2018-01-31

## 2018-01-31 RX ORDER — PREDNISONE 20 MG/1
20 TABLET ORAL 2 TIMES DAILY
Qty: 10 TABLET | Refills: 0 | Status: SHIPPED | OUTPATIENT
Start: 2018-01-31 | End: 2018-02-05

## 2018-02-26 RX ORDER — MELOXICAM 15 MG/1
TABLET ORAL
Qty: 90 TABLET | Refills: 1 | Status: SHIPPED | OUTPATIENT
Start: 2018-02-26 | End: 2018-10-30 | Stop reason: SDUPTHER

## 2018-02-26 RX ORDER — MONTELUKAST SODIUM 10 MG/1
TABLET ORAL
Qty: 90 TABLET | Refills: 1 | Status: SHIPPED | OUTPATIENT
Start: 2018-02-26 | End: 2018-10-30 | Stop reason: SDUPTHER

## 2018-02-26 RX ORDER — METFORMIN HYDROCHLORIDE 500 MG/1
TABLET, EXTENDED RELEASE ORAL
Qty: 180 TABLET | Refills: 1 | Status: SHIPPED | OUTPATIENT
Start: 2018-02-26 | End: 2018-07-09 | Stop reason: SDUPTHER

## 2018-04-13 RX ORDER — CHLORTHALIDONE 25 MG/1
TABLET ORAL
Qty: 90 TABLET | Refills: 1 | Status: SHIPPED | OUTPATIENT
Start: 2018-04-13 | End: 2018-07-09 | Stop reason: SDUPTHER

## 2018-04-18 ENCOUNTER — OFFICE VISIT (OUTPATIENT)
Dept: FAMILY MEDICINE CLINIC | Age: 73
End: 2018-04-18

## 2018-04-18 VITALS
WEIGHT: 156.6 LBS | DIASTOLIC BLOOD PRESSURE: 62 MMHG | HEART RATE: 64 BPM | BODY MASS INDEX: 27.75 KG/M2 | OXYGEN SATURATION: 96 % | TEMPERATURE: 98.1 F | SYSTOLIC BLOOD PRESSURE: 134 MMHG

## 2018-04-18 DIAGNOSIS — I10 BENIGN ESSENTIAL HYPERTENSION: ICD-10-CM

## 2018-04-18 DIAGNOSIS — E78.00 PURE HYPERCHOLESTEROLEMIA: ICD-10-CM

## 2018-04-18 DIAGNOSIS — N95.1 POST MENOPAUSAL SYNDROME: ICD-10-CM

## 2018-04-18 DIAGNOSIS — M15.9 PRIMARY OSTEOARTHRITIS INVOLVING MULTIPLE JOINTS: ICD-10-CM

## 2018-04-18 DIAGNOSIS — E11.9 CONTROLLED TYPE 2 DIABETES MELLITUS WITHOUT COMPLICATION, WITHOUT LONG-TERM CURRENT USE OF INSULIN (HCC): Primary | ICD-10-CM

## 2018-04-18 DIAGNOSIS — K21.9 GASTROESOPHAGEAL REFLUX DISEASE WITHOUT ESOPHAGITIS: ICD-10-CM

## 2018-04-18 DIAGNOSIS — R07.89 OTHER CHEST PAIN: ICD-10-CM

## 2018-04-18 DIAGNOSIS — J30.9 ALLERGIC SINUSITIS: ICD-10-CM

## 2018-04-18 LAB
ANION GAP SERPL CALCULATED.3IONS-SCNC: 16 MMOL/L (ref 3–16)
BASOPHILS ABSOLUTE: 0.1 K/UL (ref 0–0.2)
BASOPHILS RELATIVE PERCENT: 1.2 %
BUN BLDV-MCNC: 18 MG/DL (ref 7–20)
CALCIUM SERPL-MCNC: 9.4 MG/DL (ref 8.3–10.6)
CHLORIDE BLD-SCNC: 100 MMOL/L (ref 99–110)
CHOLESTEROL, TOTAL: 184 MG/DL (ref 0–199)
CO2: 26 MMOL/L (ref 21–32)
CREAT SERPL-MCNC: 0.6 MG/DL (ref 0.6–1.2)
CREATININE URINE POCT: 100
EOSINOPHILS ABSOLUTE: 0.1 K/UL (ref 0–0.6)
EOSINOPHILS RELATIVE PERCENT: 3 %
GFR AFRICAN AMERICAN: >60
GFR NON-AFRICAN AMERICAN: >60
GLUCOSE BLD-MCNC: 117 MG/DL (ref 70–99)
HBA1C MFR BLD: 6.3 %
HCT VFR BLD CALC: 41.6 % (ref 36–48)
HDLC SERPL-MCNC: 62 MG/DL (ref 40–60)
HEMOGLOBIN: 13.9 G/DL (ref 12–16)
LDL CHOLESTEROL CALCULATED: 89 MG/DL
LYMPHOCYTES ABSOLUTE: 1.2 K/UL (ref 1–5.1)
LYMPHOCYTES RELATIVE PERCENT: 27.6 %
MCH RBC QN AUTO: 31.5 PG (ref 26–34)
MCHC RBC AUTO-ENTMCNC: 33.3 G/DL (ref 31–36)
MCV RBC AUTO: 94.5 FL (ref 80–100)
MICROALBUMIN/CREAT 24H UR: 10 MG/G{CREAT}
MICROALBUMIN/CREAT UR-RTO: <30
MONOCYTES ABSOLUTE: 0.4 K/UL (ref 0–1.3)
MONOCYTES RELATIVE PERCENT: 8.5 %
NEUTROPHILS ABSOLUTE: 2.7 K/UL (ref 1.7–7.7)
NEUTROPHILS RELATIVE PERCENT: 59.7 %
PDW BLD-RTO: 13.3 % (ref 12.4–15.4)
PLATELET # BLD: 248 K/UL (ref 135–450)
PMV BLD AUTO: 9.4 FL (ref 5–10.5)
POTASSIUM SERPL-SCNC: 3.8 MMOL/L (ref 3.5–5.1)
RBC # BLD: 4.4 M/UL (ref 4–5.2)
SODIUM BLD-SCNC: 142 MMOL/L (ref 136–145)
TRIGL SERPL-MCNC: 166 MG/DL (ref 0–150)
VLDLC SERPL CALC-MCNC: 33 MG/DL
WBC # BLD: 4.5 K/UL (ref 4–11)

## 2018-04-18 PROCEDURE — 83036 HEMOGLOBIN GLYCOSYLATED A1C: CPT | Performed by: FAMILY MEDICINE

## 2018-04-18 PROCEDURE — 1036F TOBACCO NON-USER: CPT | Performed by: FAMILY MEDICINE

## 2018-04-18 PROCEDURE — 36415 COLL VENOUS BLD VENIPUNCTURE: CPT | Performed by: FAMILY MEDICINE

## 2018-04-18 PROCEDURE — 1123F ACP DISCUSS/DSCN MKR DOCD: CPT | Performed by: FAMILY MEDICINE

## 2018-04-18 PROCEDURE — G8417 CALC BMI ABV UP PARAM F/U: HCPCS | Performed by: FAMILY MEDICINE

## 2018-04-18 PROCEDURE — 1090F PRES/ABSN URINE INCON ASSESS: CPT | Performed by: FAMILY MEDICINE

## 2018-04-18 PROCEDURE — 99214 OFFICE O/P EST MOD 30 MIN: CPT | Performed by: FAMILY MEDICINE

## 2018-04-18 PROCEDURE — 3014F SCREEN MAMMO DOC REV: CPT | Performed by: FAMILY MEDICINE

## 2018-04-18 PROCEDURE — 3044F HG A1C LEVEL LT 7.0%: CPT | Performed by: FAMILY MEDICINE

## 2018-04-18 PROCEDURE — G8399 PT W/DXA RESULTS DOCUMENT: HCPCS | Performed by: FAMILY MEDICINE

## 2018-04-18 PROCEDURE — 4040F PNEUMOC VAC/ADMIN/RCVD: CPT | Performed by: FAMILY MEDICINE

## 2018-04-18 PROCEDURE — 82044 UR ALBUMIN SEMIQUANTITATIVE: CPT | Performed by: FAMILY MEDICINE

## 2018-04-18 PROCEDURE — G8427 DOCREV CUR MEDS BY ELIG CLIN: HCPCS | Performed by: FAMILY MEDICINE

## 2018-04-18 PROCEDURE — 93000 ELECTROCARDIOGRAM COMPLETE: CPT | Performed by: FAMILY MEDICINE

## 2018-04-18 PROCEDURE — 2022F DILAT RTA XM EVC RTNOPTHY: CPT | Performed by: FAMILY MEDICINE

## 2018-04-18 PROCEDURE — 3017F COLORECTAL CA SCREEN DOC REV: CPT | Performed by: FAMILY MEDICINE

## 2018-04-18 RX ORDER — FEXOFENADINE HCL AND PSEUDOEPHEDRINE HCI 60; 120 MG/1; MG/1
1 TABLET, EXTENDED RELEASE ORAL 2 TIMES DAILY
Qty: 60 TABLET | Refills: 5 | Status: SHIPPED | OUTPATIENT
Start: 2018-04-18 | End: 2019-05-31

## 2018-04-18 ASSESSMENT — PATIENT HEALTH QUESTIONNAIRE - PHQ9
2. FEELING DOWN, DEPRESSED OR HOPELESS: 0
SUM OF ALL RESPONSES TO PHQ9 QUESTIONS 1 & 2: 0
1. LITTLE INTEREST OR PLEASURE IN DOING THINGS: 0
SUM OF ALL RESPONSES TO PHQ QUESTIONS 1-9: 0

## 2018-05-01 ENCOUNTER — HOSPITAL ENCOUNTER (OUTPATIENT)
Dept: NON INVASIVE DIAGNOSTICS | Age: 73
Discharge: HOME | End: 2018-05-01
Attending: FAMILY MEDICINE | Admitting: FAMILY MEDICINE

## 2018-05-01 VITALS
WEIGHT: 155 LBS | SYSTOLIC BLOOD PRESSURE: 171 MMHG | DIASTOLIC BLOOD PRESSURE: 92 MMHG | TEMPERATURE: 98 F | RESPIRATION RATE: 15 BRPM | HEIGHT: 63 IN | HEART RATE: 81 BPM | BODY MASS INDEX: 27.46 KG/M2

## 2018-05-01 DIAGNOSIS — R07.89 OTHER CHEST PAIN: ICD-10-CM

## 2018-05-01 LAB
LV EF: 65 %
LVEF MODALITY: NORMAL

## 2018-05-01 ASSESSMENT — PAIN - FUNCTIONAL ASSESSMENT
PAIN_FUNCTIONAL_ASSESSMENT: 0-10
PAIN_FUNCTIONAL_ASSESSMENT: 0-10

## 2018-05-24 RX ORDER — ALBUTEROL SULFATE 1.25 MG/3ML
1 SOLUTION RESPIRATORY (INHALATION) EVERY 4 HOURS PRN
Qty: 25 VIAL | Refills: 1 | Status: SHIPPED | OUTPATIENT
Start: 2018-05-24 | End: 2021-04-08 | Stop reason: ALTCHOICE

## 2018-05-30 ENCOUNTER — PATIENT MESSAGE (OUTPATIENT)
Dept: FAMILY MEDICINE CLINIC | Age: 73
End: 2018-05-30

## 2018-06-07 RX ORDER — ALBUTEROL SULFATE 90 UG/1
1-2 AEROSOL, METERED RESPIRATORY (INHALATION) EVERY 4 HOURS PRN
Qty: 6 INHALER | Refills: 0 | Status: SHIPPED | OUTPATIENT
Start: 2018-06-07

## 2018-07-09 RX ORDER — PRAVASTATIN SODIUM 10 MG
TABLET ORAL
Qty: 90 TABLET | Refills: 1 | Status: SHIPPED | OUTPATIENT
Start: 2018-07-09 | End: 2018-10-30 | Stop reason: SDUPTHER

## 2018-07-09 RX ORDER — CHLORTHALIDONE 25 MG/1
TABLET ORAL
Qty: 90 TABLET | Refills: 1 | Status: SHIPPED | OUTPATIENT
Start: 2018-07-09 | End: 2018-10-30 | Stop reason: SDUPTHER

## 2018-07-09 RX ORDER — METFORMIN HYDROCHLORIDE 500 MG/1
TABLET, EXTENDED RELEASE ORAL
Qty: 180 TABLET | Refills: 1 | Status: SHIPPED | OUTPATIENT
Start: 2018-07-09 | End: 2018-08-01

## 2018-07-23 ENCOUNTER — OFFICE VISIT (OUTPATIENT)
Dept: FAMILY MEDICINE CLINIC | Age: 73
End: 2018-07-23

## 2018-07-23 VITALS
WEIGHT: 156 LBS | TEMPERATURE: 98.9 F | DIASTOLIC BLOOD PRESSURE: 68 MMHG | SYSTOLIC BLOOD PRESSURE: 132 MMHG | OXYGEN SATURATION: 97 % | BODY MASS INDEX: 27.63 KG/M2 | HEART RATE: 67 BPM

## 2018-07-23 DIAGNOSIS — R19.7 DIARRHEA, UNSPECIFIED TYPE: Primary | ICD-10-CM

## 2018-07-23 PROCEDURE — 1090F PRES/ABSN URINE INCON ASSESS: CPT | Performed by: FAMILY MEDICINE

## 2018-07-23 PROCEDURE — G8417 CALC BMI ABV UP PARAM F/U: HCPCS | Performed by: FAMILY MEDICINE

## 2018-07-23 PROCEDURE — 1123F ACP DISCUSS/DSCN MKR DOCD: CPT | Performed by: FAMILY MEDICINE

## 2018-07-23 PROCEDURE — 4040F PNEUMOC VAC/ADMIN/RCVD: CPT | Performed by: FAMILY MEDICINE

## 2018-07-23 PROCEDURE — 99212 OFFICE O/P EST SF 10 MIN: CPT | Performed by: FAMILY MEDICINE

## 2018-07-23 PROCEDURE — G8399 PT W/DXA RESULTS DOCUMENT: HCPCS | Performed by: FAMILY MEDICINE

## 2018-07-23 PROCEDURE — 1101F PT FALLS ASSESS-DOCD LE1/YR: CPT | Performed by: FAMILY MEDICINE

## 2018-07-23 PROCEDURE — 3017F COLORECTAL CA SCREEN DOC REV: CPT | Performed by: FAMILY MEDICINE

## 2018-07-23 PROCEDURE — G8427 DOCREV CUR MEDS BY ELIG CLIN: HCPCS | Performed by: FAMILY MEDICINE

## 2018-07-23 PROCEDURE — 1036F TOBACCO NON-USER: CPT | Performed by: FAMILY MEDICINE

## 2018-07-23 NOTE — PATIENT INSTRUCTIONS
Please compare this printed medication list with your medications at home to be sure they are the same. If you have any medications that are different please contact us immediately at [phone]. Also review your allergies that we have listed, these may also include medications that you have not been able to tolerate, make sure everything listed is correct. If you have any allergies that are different please contact us immediately at [phone].

## 2018-07-23 NOTE — PROGRESS NOTES
Assessment and plan  1. Diarrhea, unspecified type  Stop metformin and observe    Return to clinic as scheduled August 1 or call prn if these symptoms worsen or fail to improve and resolve as discussed    Subjective  Patient presents to consult regarding persisting chronic loose daytime stools. She is on metformin 500 XR mg q.a.m. and is concerned it's causing the diarrhea. She has no other symptoms. No cramps or bleeding. She also has a history of IBS    Objective  /68   Pulse 67   Temp 98.9 °F (37.2 °C) (Oral)   Wt 156 lb (70.8 kg)   SpO2 97%   BMI 27.63 kg/m²   Patient no acute distress. Ashli Mcclellan MD    The note was completed using Dragon voice recognition transcription. Every effort was made to ensure accuracy; however, inadvertent  transcription errors may be present despite my best efforts to edit errors.

## 2018-08-01 ENCOUNTER — OFFICE VISIT (OUTPATIENT)
Dept: FAMILY MEDICINE CLINIC | Age: 73
End: 2018-08-01

## 2018-08-01 VITALS
WEIGHT: 156 LBS | HEART RATE: 75 BPM | BODY MASS INDEX: 27.63 KG/M2 | OXYGEN SATURATION: 97 % | TEMPERATURE: 97.3 F | DIASTOLIC BLOOD PRESSURE: 76 MMHG | SYSTOLIC BLOOD PRESSURE: 134 MMHG

## 2018-08-01 DIAGNOSIS — I10 BENIGN ESSENTIAL HYPERTENSION: ICD-10-CM

## 2018-08-01 DIAGNOSIS — E11.9 CONTROLLED TYPE 2 DIABETES MELLITUS WITHOUT COMPLICATION, WITHOUT LONG-TERM CURRENT USE OF INSULIN (HCC): Primary | ICD-10-CM

## 2018-08-01 DIAGNOSIS — R19.7 DIARRHEA, UNSPECIFIED TYPE: ICD-10-CM

## 2018-08-01 DIAGNOSIS — N95.1 POST MENOPAUSAL SYNDROME: ICD-10-CM

## 2018-08-01 DIAGNOSIS — K21.9 GASTROESOPHAGEAL REFLUX DISEASE WITHOUT ESOPHAGITIS: ICD-10-CM

## 2018-08-01 LAB — HBA1C MFR BLD: 6.3 %

## 2018-08-01 PROCEDURE — 1123F ACP DISCUSS/DSCN MKR DOCD: CPT | Performed by: FAMILY MEDICINE

## 2018-08-01 PROCEDURE — 99214 OFFICE O/P EST MOD 30 MIN: CPT | Performed by: FAMILY MEDICINE

## 2018-08-01 PROCEDURE — 1101F PT FALLS ASSESS-DOCD LE1/YR: CPT | Performed by: FAMILY MEDICINE

## 2018-08-01 PROCEDURE — 4040F PNEUMOC VAC/ADMIN/RCVD: CPT | Performed by: FAMILY MEDICINE

## 2018-08-01 PROCEDURE — 83036 HEMOGLOBIN GLYCOSYLATED A1C: CPT | Performed by: FAMILY MEDICINE

## 2018-08-01 PROCEDURE — 2022F DILAT RTA XM EVC RTNOPTHY: CPT | Performed by: FAMILY MEDICINE

## 2018-08-01 PROCEDURE — 3017F COLORECTAL CA SCREEN DOC REV: CPT | Performed by: FAMILY MEDICINE

## 2018-08-01 PROCEDURE — G8417 CALC BMI ABV UP PARAM F/U: HCPCS | Performed by: FAMILY MEDICINE

## 2018-08-01 PROCEDURE — 1036F TOBACCO NON-USER: CPT | Performed by: FAMILY MEDICINE

## 2018-08-01 PROCEDURE — 1090F PRES/ABSN URINE INCON ASSESS: CPT | Performed by: FAMILY MEDICINE

## 2018-08-01 PROCEDURE — G8399 PT W/DXA RESULTS DOCUMENT: HCPCS | Performed by: FAMILY MEDICINE

## 2018-08-01 PROCEDURE — G8427 DOCREV CUR MEDS BY ELIG CLIN: HCPCS | Performed by: FAMILY MEDICINE

## 2018-08-01 PROCEDURE — 3044F HG A1C LEVEL LT 7.0%: CPT | Performed by: FAMILY MEDICINE

## 2018-08-01 RX ORDER — METFORMIN HYDROCHLORIDE 500 MG/1
500 TABLET, EXTENDED RELEASE ORAL
COMMUNITY
End: 2018-12-20 | Stop reason: SDUPTHER

## 2018-08-01 NOTE — PATIENT INSTRUCTIONS
Please read the healthy family handout that you were given and share it with your family. Please compare this printed medication list with your medications at home to be sure they are the same. If you have any medications that are different please contact us immediately at [phone]. Also review your allergies that we have listed, these may also include medications that you have not been able to tolerate, make sure everything listed is correct. If you have any allergies that are different please contact us immediately at [phone].

## 2018-08-20 ENCOUNTER — HOSPITAL ENCOUNTER (OUTPATIENT)
Dept: GENERAL RADIOLOGY | Age: 73
Discharge: HOME | End: 2018-08-20
Payer: MEDICARE

## 2018-08-20 DIAGNOSIS — N95.1 POST MENOPAUSAL SYNDROME: ICD-10-CM

## 2018-08-20 PROCEDURE — 77080 DXA BONE DENSITY AXIAL: CPT

## 2018-10-31 RX ORDER — MELOXICAM 15 MG/1
TABLET ORAL
Qty: 90 TABLET | Refills: 1 | Status: SHIPPED | OUTPATIENT
Start: 2018-10-31 | End: 2019-09-25 | Stop reason: SDUPTHER

## 2018-10-31 RX ORDER — MONTELUKAST SODIUM 10 MG/1
TABLET ORAL
Qty: 90 TABLET | Refills: 1 | Status: SHIPPED | OUTPATIENT
Start: 2018-10-31 | End: 2019-09-25 | Stop reason: SDUPTHER

## 2018-10-31 RX ORDER — METFORMIN HYDROCHLORIDE 500 MG/1
TABLET, EXTENDED RELEASE ORAL
Qty: 180 TABLET | Refills: 1 | Status: SHIPPED | OUTPATIENT
Start: 2018-10-31 | End: 2019-06-08 | Stop reason: SDUPTHER

## 2018-10-31 RX ORDER — CHLORTHALIDONE 25 MG/1
TABLET ORAL
Qty: 90 TABLET | Refills: 1 | Status: SHIPPED | OUTPATIENT
Start: 2018-10-31 | End: 2019-06-08 | Stop reason: SDUPTHER

## 2018-10-31 RX ORDER — BUDESONIDE AND FORMOTEROL FUMARATE DIHYDRATE 160; 4.5 UG/1; UG/1
AEROSOL RESPIRATORY (INHALATION)
Qty: 30.6 G | Refills: 1 | Status: SHIPPED | OUTPATIENT
Start: 2018-10-31 | End: 2020-01-29

## 2018-10-31 RX ORDER — PRAVASTATIN SODIUM 10 MG
TABLET ORAL
Qty: 90 TABLET | Refills: 1 | Status: SHIPPED | OUTPATIENT
Start: 2018-10-31 | End: 2019-09-25 | Stop reason: SDUPTHER

## 2018-11-08 ENCOUNTER — TELEPHONE (OUTPATIENT)
Dept: FAMILY MEDICINE CLINIC | Age: 73
End: 2018-11-08

## 2018-11-09 ENCOUNTER — TELEPHONE (OUTPATIENT)
Dept: FAMILY MEDICINE CLINIC | Age: 73
End: 2018-11-09

## 2018-11-09 NOTE — TELEPHONE ENCOUNTER
Patient has had head/chest congestion x 2 weeks clear drainage no fever, has a cough that won't stop, has been using allegra d, singulair with no relieve states she gets every fall, wanting to know if medication can be called in, please advise.

## 2018-11-12 ENCOUNTER — OFFICE VISIT (OUTPATIENT)
Dept: FAMILY MEDICINE CLINIC | Age: 73
End: 2018-11-12
Payer: MEDICARE

## 2018-11-12 VITALS
WEIGHT: 154.6 LBS | HEART RATE: 71 BPM | BODY MASS INDEX: 27.39 KG/M2 | SYSTOLIC BLOOD PRESSURE: 131 MMHG | OXYGEN SATURATION: 97 % | DIASTOLIC BLOOD PRESSURE: 84 MMHG | TEMPERATURE: 97.7 F

## 2018-11-12 DIAGNOSIS — J01.90 ACUTE BACTERIAL SINUSITIS: Primary | ICD-10-CM

## 2018-11-12 DIAGNOSIS — B96.89 ACUTE BACTERIAL SINUSITIS: Primary | ICD-10-CM

## 2018-11-12 PROCEDURE — 1036F TOBACCO NON-USER: CPT | Performed by: FAMILY MEDICINE

## 2018-11-12 PROCEDURE — 1123F ACP DISCUSS/DSCN MKR DOCD: CPT | Performed by: FAMILY MEDICINE

## 2018-11-12 PROCEDURE — G8417 CALC BMI ABV UP PARAM F/U: HCPCS | Performed by: FAMILY MEDICINE

## 2018-11-12 PROCEDURE — G8399 PT W/DXA RESULTS DOCUMENT: HCPCS | Performed by: FAMILY MEDICINE

## 2018-11-12 PROCEDURE — 1090F PRES/ABSN URINE INCON ASSESS: CPT | Performed by: FAMILY MEDICINE

## 2018-11-12 PROCEDURE — 3017F COLORECTAL CA SCREEN DOC REV: CPT | Performed by: FAMILY MEDICINE

## 2018-11-12 PROCEDURE — 4040F PNEUMOC VAC/ADMIN/RCVD: CPT | Performed by: FAMILY MEDICINE

## 2018-11-12 PROCEDURE — G8427 DOCREV CUR MEDS BY ELIG CLIN: HCPCS | Performed by: FAMILY MEDICINE

## 2018-11-12 PROCEDURE — 1101F PT FALLS ASSESS-DOCD LE1/YR: CPT | Performed by: FAMILY MEDICINE

## 2018-11-12 PROCEDURE — 99213 OFFICE O/P EST LOW 20 MIN: CPT | Performed by: FAMILY MEDICINE

## 2018-11-12 PROCEDURE — G8484 FLU IMMUNIZE NO ADMIN: HCPCS | Performed by: FAMILY MEDICINE

## 2018-11-12 RX ORDER — CEFDINIR 300 MG/1
300 CAPSULE ORAL 2 TIMES DAILY
Qty: 20 CAPSULE | Refills: 0 | Status: SHIPPED | OUTPATIENT
Start: 2018-11-12 | End: 2019-05-31 | Stop reason: SDUPTHER

## 2018-11-12 RX ORDER — FLUCONAZOLE 150 MG/1
150 TABLET ORAL DAILY PRN
Qty: 1 TABLET | Refills: 1 | Status: SHIPPED | OUTPATIENT
Start: 2018-11-12 | End: 2018-11-13

## 2018-12-20 ENCOUNTER — OFFICE VISIT (OUTPATIENT)
Dept: FAMILY MEDICINE CLINIC | Age: 73
End: 2018-12-20
Payer: MEDICARE

## 2018-12-20 VITALS
SYSTOLIC BLOOD PRESSURE: 124 MMHG | DIASTOLIC BLOOD PRESSURE: 68 MMHG | BODY MASS INDEX: 27.46 KG/M2 | TEMPERATURE: 98.1 F | OXYGEN SATURATION: 98 % | WEIGHT: 155 LBS | HEART RATE: 64 BPM

## 2018-12-20 DIAGNOSIS — E78.00 PURE HYPERCHOLESTEROLEMIA: ICD-10-CM

## 2018-12-20 DIAGNOSIS — K21.9 GASTROESOPHAGEAL REFLUX DISEASE WITHOUT ESOPHAGITIS: ICD-10-CM

## 2018-12-20 DIAGNOSIS — E11.9 CONTROLLED TYPE 2 DIABETES MELLITUS WITHOUT COMPLICATION, WITHOUT LONG-TERM CURRENT USE OF INSULIN (HCC): Primary | ICD-10-CM

## 2018-12-20 DIAGNOSIS — I10 BENIGN ESSENTIAL HYPERTENSION: ICD-10-CM

## 2018-12-20 LAB
A/G RATIO: 2.3 (ref 1.1–2.2)
ALBUMIN SERPL-MCNC: 4.8 G/DL (ref 3.4–5)
ALP BLD-CCNC: 78 U/L (ref 40–129)
ALT SERPL-CCNC: 13 U/L (ref 10–40)
ANION GAP SERPL CALCULATED.3IONS-SCNC: 15 MMOL/L (ref 3–16)
AST SERPL-CCNC: 14 U/L (ref 15–37)
BASOPHILS ABSOLUTE: 0.1 K/UL (ref 0–0.2)
BASOPHILS RELATIVE PERCENT: 1 %
BILIRUB SERPL-MCNC: 0.5 MG/DL (ref 0–1)
BUN BLDV-MCNC: 18 MG/DL (ref 7–20)
CALCIUM SERPL-MCNC: 9.5 MG/DL (ref 8.3–10.6)
CHLORIDE BLD-SCNC: 102 MMOL/L (ref 99–110)
CHOLESTEROL, TOTAL: 194 MG/DL (ref 0–199)
CO2: 26 MMOL/L (ref 21–32)
CREAT SERPL-MCNC: 0.6 MG/DL (ref 0.6–1.2)
EOSINOPHILS ABSOLUTE: 0.1 K/UL (ref 0–0.6)
EOSINOPHILS RELATIVE PERCENT: 2.7 %
GFR AFRICAN AMERICAN: >60
GFR NON-AFRICAN AMERICAN: >60
GLOBULIN: 2.1 G/DL
GLUCOSE BLD-MCNC: 122 MG/DL (ref 70–99)
HBA1C MFR BLD: 6.6 %
HCT VFR BLD CALC: 41.8 % (ref 36–48)
HDLC SERPL-MCNC: 54 MG/DL (ref 40–60)
HEMOGLOBIN: 14.2 G/DL (ref 12–16)
LDL CHOLESTEROL CALCULATED: 97 MG/DL
LYMPHOCYTES ABSOLUTE: 1.4 K/UL (ref 1–5.1)
LYMPHOCYTES RELATIVE PERCENT: 26.8 %
MCH RBC QN AUTO: 31.1 PG (ref 26–34)
MCHC RBC AUTO-ENTMCNC: 34.1 G/DL (ref 31–36)
MCV RBC AUTO: 91.5 FL (ref 80–100)
MONOCYTES ABSOLUTE: 0.4 K/UL (ref 0–1.3)
MONOCYTES RELATIVE PERCENT: 8.7 %
NEUTROPHILS ABSOLUTE: 3.1 K/UL (ref 1.7–7.7)
NEUTROPHILS RELATIVE PERCENT: 60.8 %
PDW BLD-RTO: 13.3 % (ref 12.4–15.4)
PLATELET # BLD: 282 K/UL (ref 135–450)
PMV BLD AUTO: 9.4 FL (ref 5–10.5)
POTASSIUM SERPL-SCNC: 4.1 MMOL/L (ref 3.5–5.1)
RBC # BLD: 4.57 M/UL (ref 4–5.2)
SODIUM BLD-SCNC: 143 MMOL/L (ref 136–145)
TOTAL PROTEIN: 6.9 G/DL (ref 6.4–8.2)
TRIGL SERPL-MCNC: 216 MG/DL (ref 0–150)
VITAMIN B-12: 370 PG/ML (ref 211–911)
VLDLC SERPL CALC-MCNC: 43 MG/DL
WBC # BLD: 5.2 K/UL (ref 4–11)

## 2018-12-20 PROCEDURE — 1123F ACP DISCUSS/DSCN MKR DOCD: CPT | Performed by: FAMILY MEDICINE

## 2018-12-20 PROCEDURE — 1036F TOBACCO NON-USER: CPT | Performed by: FAMILY MEDICINE

## 2018-12-20 PROCEDURE — G8427 DOCREV CUR MEDS BY ELIG CLIN: HCPCS | Performed by: FAMILY MEDICINE

## 2018-12-20 PROCEDURE — 2022F DILAT RTA XM EVC RTNOPTHY: CPT | Performed by: FAMILY MEDICINE

## 2018-12-20 PROCEDURE — 3017F COLORECTAL CA SCREEN DOC REV: CPT | Performed by: FAMILY MEDICINE

## 2018-12-20 PROCEDURE — G8417 CALC BMI ABV UP PARAM F/U: HCPCS | Performed by: FAMILY MEDICINE

## 2018-12-20 PROCEDURE — 1090F PRES/ABSN URINE INCON ASSESS: CPT | Performed by: FAMILY MEDICINE

## 2018-12-20 PROCEDURE — 1101F PT FALLS ASSESS-DOCD LE1/YR: CPT | Performed by: FAMILY MEDICINE

## 2018-12-20 PROCEDURE — 99214 OFFICE O/P EST MOD 30 MIN: CPT | Performed by: FAMILY MEDICINE

## 2018-12-20 PROCEDURE — 36415 COLL VENOUS BLD VENIPUNCTURE: CPT | Performed by: FAMILY MEDICINE

## 2018-12-20 PROCEDURE — G8484 FLU IMMUNIZE NO ADMIN: HCPCS | Performed by: FAMILY MEDICINE

## 2018-12-20 PROCEDURE — 3044F HG A1C LEVEL LT 7.0%: CPT | Performed by: FAMILY MEDICINE

## 2018-12-20 PROCEDURE — G8399 PT W/DXA RESULTS DOCUMENT: HCPCS | Performed by: FAMILY MEDICINE

## 2018-12-20 PROCEDURE — 4040F PNEUMOC VAC/ADMIN/RCVD: CPT | Performed by: FAMILY MEDICINE

## 2018-12-20 PROCEDURE — 83036 HEMOGLOBIN GLYCOSYLATED A1C: CPT | Performed by: FAMILY MEDICINE

## 2018-12-20 NOTE — PATIENT INSTRUCTIONS
Please read the healthy family handout that you were given and share it with your family. Please compare this printed medication list with your medications at home to be sure they are the same. If you have any medications that are different please contact us immediately at [phone]. Also review your allergies that we have listed, these may also include medications that you have not been able to tolerate, make sure everything listed is correct. If you have any allergies that are different please contact us immediately at [phone].

## 2019-05-03 ENCOUNTER — OFFICE VISIT (OUTPATIENT)
Dept: FAMILY MEDICINE CLINIC | Age: 74
End: 2019-05-03
Payer: MEDICARE

## 2019-05-03 VITALS
HEIGHT: 63 IN | HEART RATE: 67 BPM | TEMPERATURE: 98.7 F | WEIGHT: 153.6 LBS | DIASTOLIC BLOOD PRESSURE: 77 MMHG | SYSTOLIC BLOOD PRESSURE: 132 MMHG | BODY MASS INDEX: 27.21 KG/M2 | OXYGEN SATURATION: 97 %

## 2019-05-03 DIAGNOSIS — I10 BENIGN ESSENTIAL HYPERTENSION: ICD-10-CM

## 2019-05-03 DIAGNOSIS — E11.9 CONTROLLED TYPE 2 DIABETES MELLITUS WITHOUT COMPLICATION, WITHOUT LONG-TERM CURRENT USE OF INSULIN (HCC): Primary | ICD-10-CM

## 2019-05-03 DIAGNOSIS — G89.29 CHRONIC RIGHT-SIDED LOW BACK PAIN WITH RIGHT-SIDED SCIATICA: ICD-10-CM

## 2019-05-03 DIAGNOSIS — R07.89 CHEST TIGHTNESS OR PRESSURE: ICD-10-CM

## 2019-05-03 DIAGNOSIS — M54.41 CHRONIC RIGHT-SIDED LOW BACK PAIN WITH RIGHT-SIDED SCIATICA: ICD-10-CM

## 2019-05-03 LAB
A/G RATIO: 1.8 (ref 1.1–2.2)
ALBUMIN SERPL-MCNC: 4.2 G/DL (ref 3.4–5)
ALP BLD-CCNC: 81 U/L (ref 40–129)
ALT SERPL-CCNC: 14 U/L (ref 10–40)
ANION GAP SERPL CALCULATED.3IONS-SCNC: 14 MMOL/L (ref 3–16)
AST SERPL-CCNC: 13 U/L (ref 15–37)
BASOPHILS ABSOLUTE: 0.1 K/UL (ref 0–0.2)
BASOPHILS RELATIVE PERCENT: 1.2 %
BILIRUB SERPL-MCNC: 0.5 MG/DL (ref 0–1)
BUN BLDV-MCNC: 20 MG/DL (ref 7–20)
CALCIUM SERPL-MCNC: 9.4 MG/DL (ref 8.3–10.6)
CHLORIDE BLD-SCNC: 102 MMOL/L (ref 99–110)
CO2: 26 MMOL/L (ref 21–32)
CREAT SERPL-MCNC: 0.7 MG/DL (ref 0.6–1.2)
CREATININE URINE POCT: 100
EOSINOPHILS ABSOLUTE: 0.1 K/UL (ref 0–0.6)
EOSINOPHILS RELATIVE PERCENT: 2.8 %
GFR AFRICAN AMERICAN: >60
GFR NON-AFRICAN AMERICAN: >60
GLOBULIN: 2.4 G/DL
GLUCOSE BLD-MCNC: 128 MG/DL (ref 70–99)
HBA1C MFR BLD: 6.4 %
HCT VFR BLD CALC: 40.5 % (ref 36–48)
HEMOGLOBIN: 13.7 G/DL (ref 12–16)
LYMPHOCYTES ABSOLUTE: 1.2 K/UL (ref 1–5.1)
LYMPHOCYTES RELATIVE PERCENT: 24.6 %
MCH RBC QN AUTO: 31 PG (ref 26–34)
MCHC RBC AUTO-ENTMCNC: 33.7 G/DL (ref 31–36)
MCV RBC AUTO: 92 FL (ref 80–100)
MICROALBUMIN/CREAT 24H UR: 10 MG/G{CREAT}
MICROALBUMIN/CREAT UR-RTO: <30
MONOCYTES ABSOLUTE: 0.4 K/UL (ref 0–1.3)
MONOCYTES RELATIVE PERCENT: 8.5 %
NEUTROPHILS ABSOLUTE: 3.1 K/UL (ref 1.7–7.7)
NEUTROPHILS RELATIVE PERCENT: 62.9 %
PDW BLD-RTO: 13.1 % (ref 12.4–15.4)
PLATELET # BLD: 256 K/UL (ref 135–450)
PMV BLD AUTO: 9.4 FL (ref 5–10.5)
POTASSIUM SERPL-SCNC: 3.7 MMOL/L (ref 3.5–5.1)
RBC # BLD: 4.4 M/UL (ref 4–5.2)
SODIUM BLD-SCNC: 142 MMOL/L (ref 136–145)
TOTAL PROTEIN: 6.6 G/DL (ref 6.4–8.2)
WBC # BLD: 4.9 K/UL (ref 4–11)

## 2019-05-03 PROCEDURE — 1090F PRES/ABSN URINE INCON ASSESS: CPT | Performed by: FAMILY MEDICINE

## 2019-05-03 PROCEDURE — 3017F COLORECTAL CA SCREEN DOC REV: CPT | Performed by: FAMILY MEDICINE

## 2019-05-03 PROCEDURE — G8427 DOCREV CUR MEDS BY ELIG CLIN: HCPCS | Performed by: FAMILY MEDICINE

## 2019-05-03 PROCEDURE — 1123F ACP DISCUSS/DSCN MKR DOCD: CPT | Performed by: FAMILY MEDICINE

## 2019-05-03 PROCEDURE — 1036F TOBACCO NON-USER: CPT | Performed by: FAMILY MEDICINE

## 2019-05-03 PROCEDURE — 82044 UR ALBUMIN SEMIQUANTITATIVE: CPT | Performed by: FAMILY MEDICINE

## 2019-05-03 PROCEDURE — 93000 ELECTROCARDIOGRAM COMPLETE: CPT | Performed by: FAMILY MEDICINE

## 2019-05-03 PROCEDURE — G8417 CALC BMI ABV UP PARAM F/U: HCPCS | Performed by: FAMILY MEDICINE

## 2019-05-03 PROCEDURE — 83036 HEMOGLOBIN GLYCOSYLATED A1C: CPT | Performed by: FAMILY MEDICINE

## 2019-05-03 PROCEDURE — G8399 PT W/DXA RESULTS DOCUMENT: HCPCS | Performed by: FAMILY MEDICINE

## 2019-05-03 PROCEDURE — 4040F PNEUMOC VAC/ADMIN/RCVD: CPT | Performed by: FAMILY MEDICINE

## 2019-05-03 PROCEDURE — 3044F HG A1C LEVEL LT 7.0%: CPT | Performed by: FAMILY MEDICINE

## 2019-05-03 PROCEDURE — 36415 COLL VENOUS BLD VENIPUNCTURE: CPT | Performed by: FAMILY MEDICINE

## 2019-05-03 PROCEDURE — 2022F DILAT RTA XM EVC RTNOPTHY: CPT | Performed by: FAMILY MEDICINE

## 2019-05-03 PROCEDURE — 99214 OFFICE O/P EST MOD 30 MIN: CPT | Performed by: FAMILY MEDICINE

## 2019-05-03 ASSESSMENT — PATIENT HEALTH QUESTIONNAIRE - PHQ9
2. FEELING DOWN, DEPRESSED OR HOPELESS: 0
1. LITTLE INTEREST OR PLEASURE IN DOING THINGS: 0
SUM OF ALL RESPONSES TO PHQ QUESTIONS 1-9: 0
SUM OF ALL RESPONSES TO PHQ9 QUESTIONS 1 & 2: 0
SUM OF ALL RESPONSES TO PHQ QUESTIONS 1-9: 0

## 2019-05-03 NOTE — PROGRESS NOTES
Assessment and plan  1. Controlled type 2 diabetes mellitus without complication, without long-term current use of insulin (HCC)  Stable  - POCT glycosylated hemoglobin (Hb A1C)  - POCT microalbumin  - HM DIABETES FOOT EXAM    2. Chest tightness or pressure  I am not highly suspicious for coronary artery disease, but she does have risk factors. Discussed cardiology referral but she does not want to do this at this time. This does seems more like esophageal spasm. Trial of Gaviscon/viscous lidocaine, 2 tablespoons prn chest discomfort. I advised her her relief should be prompt. I asked her to call however if this persisted and the GI cocktail did not help  - EKG 12 Lead  - CBC Auto Differential  - Comprehensive Metabolic Panel    3. Chronic right-sided low back pain with right-sided sciatica  Recommend she follow-up with Dr. Roney Adams after the MRI scan  - MRI Lumbar Spine WO Contrast; Future    4. Moderate intermittent asthma without complication  Stable. Call for generic fluticasone/salmeterol when out of her Symbicort    5. Benign essential hypertension  Stable    Healthy Family prevention recommendations given. Continue all current prescription medications as listed below. RTC 4 months or sooner prn. Subjective  Patient returns for reevaluation of her medical problems including diabetes, hypertension, and asthma. Her A1c is 6.4%. Her blood pressure has been good at home. Her asthma has been doing fairly well. She continues to use Symbicort and not have to they use albuterol very often. She has a new issue today. She reports her chronic right lower back pain radiating down to her foot is progressively getting worse particularly this winter. She's not having some weakness and numbness. It bothers her only with standing or walking. She does not have any rest.  She reports she's had normal x-rays and was planning on seeing Dr. Roney Adams for his opinion.     Her review of systems was positive for chest tightness. She reports for the last 2 months she's been having 2-3 minute episodes of substernal tightness and squeezing. No other associated symptoms. It typically happens at rest.  She has a history of reflux and currently is not on any medication. She reports she's been under more stress the last 2 months as well. It has occurred several times with walking however. Medications: see list below  Allergies   Allergen Reactions    Flagyl [Metronidazole]      hives    Daypro [Oxaprozin]     Sulfa Antibiotics     Tramadol      drowsy    Amlodipine      constipation    Lisinopril      Cough       Past history:  As above. She also reports she started taking some CBD oil for her arthritis. Review of systems:  Constitutional:  fatigue - no                                                  abnormal weight loss - no  Cardiac:  chest pain or discomfort -as above                 lightheadedness - no  Respiratory: wheezing - no                       dyspnea on exertion - no            unusual cough - no  Gastrointestinal:  frequent heartburn- no                             dysphagia - no  Urologic:  Hematuria - no                   Dysuria - no    Objective  /77   Pulse 67   Temp 98.7 °F (37.1 °C) (Oral)   Ht 5' 3\" (1.6 m)   Wt 153 lb 9.6 oz (69.7 kg)   SpO2 97%   BMI 27.21 kg/m²   Patient is alert, oriented, and in no acute distress  Psych:  mood and affect are unremarkable               speech and thought processes appear intact               Behavior and appearance unremarkable  Neck:  No masses, trachea midline, phonation normal   Thyroid - unremarkable              Cervical  adenopathy - nothing significant  Chest:  No deformity of thorax               Respirations easy and unlabored              Lungs - clear to auscultation     Breath sounds - equal  Heart:  Regular rhythm with no murmur, rub or gallop. No JVD. Pretibial pitting edema - none.   Foot exam: ulcerations - none using Dragon voice recognition transcription. Every effort was made to ensure accuracy; however, inadvertent  transcription errors may be present despite my best efforts to edit errors.

## 2019-05-31 ENCOUNTER — OFFICE VISIT (OUTPATIENT)
Dept: FAMILY MEDICINE CLINIC | Age: 74
End: 2019-05-31
Payer: MEDICARE

## 2019-05-31 VITALS
BODY MASS INDEX: 27.1 KG/M2 | OXYGEN SATURATION: 96 % | WEIGHT: 153 LBS | HEART RATE: 79 BPM | TEMPERATURE: 98.8 F | SYSTOLIC BLOOD PRESSURE: 137 MMHG | DIASTOLIC BLOOD PRESSURE: 74 MMHG

## 2019-05-31 DIAGNOSIS — J01.90 ACUTE BACTERIAL SINUSITIS: Primary | ICD-10-CM

## 2019-05-31 DIAGNOSIS — J30.9 ALLERGIC SINUSITIS: ICD-10-CM

## 2019-05-31 DIAGNOSIS — B96.89 ACUTE BACTERIAL SINUSITIS: Primary | ICD-10-CM

## 2019-05-31 PROCEDURE — 99213 OFFICE O/P EST LOW 20 MIN: CPT | Performed by: FAMILY MEDICINE

## 2019-05-31 PROCEDURE — G8427 DOCREV CUR MEDS BY ELIG CLIN: HCPCS | Performed by: FAMILY MEDICINE

## 2019-05-31 PROCEDURE — G8399 PT W/DXA RESULTS DOCUMENT: HCPCS | Performed by: FAMILY MEDICINE

## 2019-05-31 PROCEDURE — 4040F PNEUMOC VAC/ADMIN/RCVD: CPT | Performed by: FAMILY MEDICINE

## 2019-05-31 PROCEDURE — 1123F ACP DISCUSS/DSCN MKR DOCD: CPT | Performed by: FAMILY MEDICINE

## 2019-05-31 PROCEDURE — 1036F TOBACCO NON-USER: CPT | Performed by: FAMILY MEDICINE

## 2019-05-31 PROCEDURE — G8417 CALC BMI ABV UP PARAM F/U: HCPCS | Performed by: FAMILY MEDICINE

## 2019-05-31 PROCEDURE — 3017F COLORECTAL CA SCREEN DOC REV: CPT | Performed by: FAMILY MEDICINE

## 2019-05-31 PROCEDURE — 1090F PRES/ABSN URINE INCON ASSESS: CPT | Performed by: FAMILY MEDICINE

## 2019-05-31 RX ORDER — CETIRIZINE HYDROCHLORIDE, PSEUDOEPHEDRINE HYDROCHLORIDE 5; 120 MG/1; MG/1
1 TABLET, FILM COATED, EXTENDED RELEASE ORAL 2 TIMES DAILY
Qty: 60 TABLET | Refills: 5 | Status: SHIPPED | OUTPATIENT
Start: 2019-05-31 | End: 2020-12-07

## 2019-05-31 RX ORDER — CEFDINIR 300 MG/1
300 CAPSULE ORAL 2 TIMES DAILY
Qty: 20 CAPSULE | Refills: 0 | Status: SHIPPED | OUTPATIENT
Start: 2019-05-31 | End: 2019-06-10

## 2019-06-05 ENCOUNTER — OFFICE VISIT (OUTPATIENT)
Dept: ORTHOPEDIC SURGERY | Age: 74
End: 2019-06-05
Payer: MEDICARE

## 2019-06-05 VITALS
HEIGHT: 63 IN | BODY MASS INDEX: 27.11 KG/M2 | SYSTOLIC BLOOD PRESSURE: 131 MMHG | HEART RATE: 74 BPM | DIASTOLIC BLOOD PRESSURE: 79 MMHG | WEIGHT: 153 LBS

## 2019-06-05 DIAGNOSIS — M48.062 LUMBAR STENOSIS WITH NEUROGENIC CLAUDICATION: Primary | ICD-10-CM

## 2019-06-05 PROCEDURE — 1090F PRES/ABSN URINE INCON ASSESS: CPT | Performed by: PHYSICAL MEDICINE & REHABILITATION

## 2019-06-05 PROCEDURE — G8417 CALC BMI ABV UP PARAM F/U: HCPCS | Performed by: PHYSICAL MEDICINE & REHABILITATION

## 2019-06-05 PROCEDURE — G8399 PT W/DXA RESULTS DOCUMENT: HCPCS | Performed by: PHYSICAL MEDICINE & REHABILITATION

## 2019-06-05 PROCEDURE — 1123F ACP DISCUSS/DSCN MKR DOCD: CPT | Performed by: PHYSICAL MEDICINE & REHABILITATION

## 2019-06-05 PROCEDURE — G8427 DOCREV CUR MEDS BY ELIG CLIN: HCPCS | Performed by: PHYSICAL MEDICINE & REHABILITATION

## 2019-06-05 PROCEDURE — 4040F PNEUMOC VAC/ADMIN/RCVD: CPT | Performed by: PHYSICAL MEDICINE & REHABILITATION

## 2019-06-05 PROCEDURE — 99203 OFFICE O/P NEW LOW 30 MIN: CPT | Performed by: PHYSICAL MEDICINE & REHABILITATION

## 2019-06-05 PROCEDURE — 1036F TOBACCO NON-USER: CPT | Performed by: PHYSICAL MEDICINE & REHABILITATION

## 2019-06-05 PROCEDURE — 3017F COLORECTAL CA SCREEN DOC REV: CPT | Performed by: PHYSICAL MEDICINE & REHABILITATION

## 2019-06-05 RX ORDER — METHYLPREDNISOLONE 4 MG/1
TABLET ORAL
Qty: 1 KIT | Refills: 0 | Status: SHIPPED | OUTPATIENT
Start: 2019-06-05 | End: 2019-06-11

## 2019-06-05 NOTE — PROGRESS NOTES
New Patient: SPINE    CHIEF COMPLAINT:    Chief Complaint   Patient presents with    Back Pain     low back pain going down rt leg [id number] getting worse, had MRI       HISTORY OF PRESENT ILLNESS:                The patient is a 69 y.o. female seen as a new patient. I'm also seeing her  today. She suffered a history of back pain in the right groin right buttock. Weakness right leg. Worse with any short standing or walking. She has well-controlled diabetes. See what options she's had. She's taking a steroid taper some relief in the past.  She got the recent chiropractics and massage. Is no progressive weakness no bowel or bladder changes. No fevers or chills      Past Medical History:   Diagnosis Date    Chest pain NEC 7/04    negative GXT/cardiolite    Depression     1997    Routine health maintenance     GYN--TAC(6/07)          Pain Assessment  Location of Pain: Back  Severity of Pain: 0    The pain assessment was noted & reviewed in the medical record today.      Current/Past Treatment:   · Physical Therapy:   · Chiropractic:     · Injection:     Medications:            NSAIDS:             Muscle relaxer:              Steriods:              Neuropathic medications:              Opioids:            Other:   · Surgery/Consult:    Work Status/Functionality:     Past Medical History: Medical history form was reviewed today & scanned into the media tab  Past Medical History:   Diagnosis Date    Chest pain NEC 7/04    negative GXT/cardiolite    Depression     1997    Routine health maintenance     GYN--TAC(6/07)      Past Surgical History:     Past Surgical History:   Procedure Laterality Date    APPENDECTOMY      COLONOSCOPY  08/03/2017    diverticulosis    DILATION AND CURETTAGE OF UTERUS      HEMORRHOID SURGERY      HERNIA REPAIR Right 10/17/2017    ROBOTIC, RIGHT INGUINAL HERNIA REPAIR WITH MESH    ROTATOR CUFF REPAIR  2002, 3/2011    MAURA AND BSO  07/2016    Dr Omer Amador, [address]     Current Medications:     Current Outpatient Medications:     cetirizine-psuedoephedrine (ZYRTEC-D) 5-120 MG per extended release tablet, Take 1 tablet by mouth 2 times daily, Disp: 60 tablet, Rfl: 5    cefdinir (OMNICEF) 300 MG capsule, Take 1 capsule by mouth 2 times daily for 10 days, Disp: 20 capsule, Rfl: 0    metFORMIN (GLUCOPHAGE-XR) 500 MG extended release tablet, TAKE 1 TABLET BY MOUTH TWO  TIMES DAILY, Disp: 180 tablet, Rfl: 1    SYMBICORT 160-4.5 MCG/ACT AERO, USE 2 PUFFS TWO TIMES DAILY, Disp: 30.6 g, Rfl: 1    chlorthalidone (HYGROTON) 25 MG tablet, TAKE 1 TABLET BY MOUTH  DAILY, Disp: 90 tablet, Rfl: 1    pravastatin (PRAVACHOL) 10 MG tablet, TAKE 1 TABLET BY MOUTH  DAILY, Disp: 90 tablet, Rfl: 1    montelukast (SINGULAIR) 10 MG tablet, TAKE 1 TABLET BY MOUTH  NIGHTLY, Disp: 90 tablet, Rfl: 1    meloxicam (MOBIC) 15 MG tablet, TAKE 1 TABLET BY MOUTH  DAILY, Disp: 90 tablet, Rfl: 1    albuterol sulfate HFA (PROAIR HFA) 108 (90 Base) MCG/ACT inhaler, Inhale 1-2 puffs into the lungs every 4 hours as needed for Wheezing or Shortness of Breath, Disp: 6 Inhaler, Rfl: 0    albuterol (ACCUNEB) 1.25 MG/3ML nebulizer solution, Inhale 3 mLs into the lungs every 4 hours as needed for Wheezing or Shortness of Breath DX: J45.20, Disp: 25 vial, Rfl: 1    dicyclomine (BENTYL) 10 MG capsule, Take 1-2 capsules by mouth 3 times daily (before meals), Disp: 180 capsule, Rfl: 5    Acetaminophen (TYLENOL PO), Take  by mouth., Disp: , Rfl:     Vitamin D (CHOLECALCIFEROL) 1000 UNITS CAPS capsule, Take 2,000 Units by mouth daily. , Disp: , Rfl:     fluticasone (FLONASE) 50 MCG/ACT nasal spray, 1-2 sprays by Nasal route nightly., Disp: 3 Bottle, Rfl: 1  Allergies:  Flagyl [metronidazole]; Daypro [oxaprozin]; Sulfa antibiotics; Tramadol; Amlodipine; and Lisinopril  Social History:    reports that she has never smoked. She has never used smokeless tobacco. She reports that she drinks alcohol.  She reports that she does not use drugs. Family History:   Family History   Problem Relation Age of Onset    Diabetes Father     Arthritis Sister        REVIEW OF SYSTEMS: Full ROS noted & scanned   CONSTITUTIONAL: Denies unexplained weight loss, fevers, chills or fatigue  NEUROLOGICAL: Denies unsteady gait or progressive weakness  MUSCULOSKELETAL: Denies joint swelling or redness  PSYCHOLOGICAL: Denies anxiety, depression   SKIN: Denies skin changes, delayed healing, rash, itching   HEMATOLOGIC: Denies easy bleeding or bruising  ENDOCRINE: Denies excessive thirst, urination, heat/cold  RESPIRATORY: Denies current dyspnea, cough  GI: Denies nausea, vomiting, diarrhea   : Denies bowel or bladder issues       PHYSICAL EXAM:    Vitals: Blood pressure 131/79, pulse 74, height 5' 2.99\" (1.6 m), weight 153 lb (69.4 kg). GENERAL EXAM:  · General Apparence: Patient is adequately groomed with no evidence of malnutrition. · Orientation: The patient is oriented to time, place and person. · Mood & Affect:The patient's mood and affect are appropriate   · Vascular: Examination reveals no swelling tenderness in upper or lower extremities. Good capillary refill  · Lymphatic: The lymphatic examination bilaterally reveals all areas to be without enlargement or induration  · Sensation: Sensation is intact without deficit  · Coordination/Balance: Good coordination       LUMBAR/SACRAL EXAMINATION:  · Inspection: Local inspection shows no step-off or bruising. Lumbar alignment is normal.  Sagittal and Coronal balance is neutral.      · Palpation:   No evidence of tenderness at the midline. No tenderness bilaterally at the paraspinal or trochanters. There is no step-off or paraspinal spasm. · Range of Motion:  Mild loss of both flexion extension without groin or back pain provocation  · Strength:   Strength testing is 5/5 in all muscle groups tested. · Special Tests:   Straight leg raise and crossed SLR negative.   Leg length and pelvis level.  0 out of 5 Luli's signs. · Skin: There are no rashes, ulcerations or lesions. · Reflexes: Reflexes are symmetrically 2+ at the patellar and ankle tendons. Clonus absent bilaterally at the feet. · Gait & station: Normal gait  · Additional Examinations: No pain with hip range of motion  · RIGHT LOWER EXTREMITY: Inspection/examination of the right lower extremity does not show any tenderness, deformity or injury. Range of motion is full. There is no gross instability. There are no rashes, ulcerations or lesions. Strength and tone are normal.  ·   · LEFT LOWER EXTREMITY:  Inspection/examination of the left lower extremity does not show any tenderness, deformity or injury. Range of motion is full. There is no gross instability. There are no rashes, ulcerations or lesions.   Strength and tone are normal.    Diagnostic Testing:      Recent hemoglobin A1c 6.2    MRI films and report reviewed showing scoliosis, multilevel DDD and spondylosis, right L2 foraminal stenosis, moderate to advanced central stenosis L4 5, grade 1 spondylolisthesis    Impression:    Lumbar spondylosis stenosis  L4 5 central stenosis, right L2 foraminal stenosis      Plan:     Medrol Dosepak  If no improvements call directly schedule right L2 transfer and right L5-S1 interlaminar    F Jacklynn Cure [id number]

## 2019-06-10 RX ORDER — METFORMIN HYDROCHLORIDE 500 MG/1
TABLET, EXTENDED RELEASE ORAL
Qty: 180 TABLET | Refills: 1 | Status: SHIPPED | OUTPATIENT
Start: 2019-06-10 | End: 2019-12-23

## 2019-06-10 RX ORDER — PRAVASTATIN SODIUM 10 MG
TABLET ORAL
Qty: 90 TABLET | Refills: 3 | Status: SHIPPED | OUTPATIENT
Start: 2019-06-10 | End: 2020-05-11

## 2019-06-10 RX ORDER — CHLORTHALIDONE 25 MG/1
TABLET ORAL
Qty: 90 TABLET | Refills: 1 | Status: SHIPPED | OUTPATIENT
Start: 2019-06-10 | End: 2019-12-23

## 2019-06-17 ENCOUNTER — TELEPHONE (OUTPATIENT)
Dept: ORTHOPEDIC SURGERY | Age: 74
End: 2019-06-17

## 2019-06-17 NOTE — TELEPHONE ENCOUNTER
DOS   06/24/2016  CPT   55867   [id number]  OP SX AUTH  NPR    RIGHT L2  TRANSFORAMINAL TRANG & RIGHT L5 - S1 INTERLAMINAR TRANG    [address]:   MEDICARE

## 2019-06-20 NOTE — H&P
HISTORY AND PHYSICAL/PRE-SEDATION ASSESSMENT    Patient: Kathleen Byrd   :  1945  Medical Record No.:  [id number]   Date:  19  Physician:  Dez Lloyd M.D. Facility: [facility]     Nursing History and Physical reviewed and agreed upon. Additional findings:    Allergies:  Flagyl [metronidazole]; Daypro [oxaprozin]; Sulfa antibiotics; Tramadol; Amlodipine; and Lisinopril    Home Medications:    Prior to Admission medications    Medication Sig Start Date End Date Taking? Authorizing Provider   metFORMIN (GLUCOPHAGE-XR) 500 MG extended release tablet TAKE 1 TABLET BY MOUTH TWO  TIMES DAILY 6/10/19   Loree Cates MD   chlorthalidone (HYGROTON) 25 MG tablet TAKE 1 TABLET BY MOUTH  DAILY 6/10/19   Loree Cates MD   pravastatin (PRAVACHOL) 10 MG tablet TAKE 1 TABLET BY MOUTH  DAILY 6/10/19   Loree Cates MD   cetirizine-psuedoephedrine (ZYRTEC-D) 5-120 MG per extended release tablet Take 1 tablet by mouth 2 times daily 19   Loree Cates MD   SYMBICORT 160-4.5 MCG/ACT AERO USE 2 PUFFS TWO TIMES DAILY 10/31/18   Loree Cates MD   pravastatin (PRAVACHOL) 10 MG tablet TAKE 1 TABLET BY MOUTH  DAILY 10/31/18   Loree Cates MD   montelukast (SINGULAIR) 10 MG tablet TAKE 1 TABLET BY MOUTH  NIGHTLY 10/31/18   Loree Cates MD   meloxicam (MOBIC) 15 MG tablet TAKE 1 TABLET BY MOUTH  DAILY 10/31/18   Loree Cates MD   albuterol sulfate HFA (PROAIR HFA) 108 (90 Base) MCG/ACT inhaler Inhale 1-2 puffs into the lungs every 4 hours as needed for Wheezing or Shortness of Breath 18   Loree Cates MD   albuterol (ACCUNEB) 1.25 MG/3ML nebulizer solution Inhale 3 mLs into the lungs every 4 hours as needed for Wheezing or Shortness of Breath DX: J45.20 18   Loree Cates MD   dicyclomine (BENTYL) 10 MG capsule Take 1-2 capsules by mouth 3 times daily (before meals) 16   Loree Cates MD   Acetaminophen (TYLENOL PO) Take  by mouth.     Historical Provider, Pt needs to see you for an ob appt  She is having a lot of leg swelling in both legs. She is 8 months pregnant.  Pt had moved out of the area-so this is why she hasn't seen you since  October.  She has moved back to the area again.    She would really like to be seen tomorrow if at all possible  Please call pt   MD   Vitamin D (CHOLECALCIFEROL) 1000 UNITS CAPS capsule Take 2,000 Units by mouth daily. Historical Provider, MD   fluticasone (FLONASE) 50 MCG/ACT nasal spray 1-2 sprays by Nasal route nightly. 11/4/13   Zaida Unger MD       Vitals: Stable     PHYSICAL EXAM:  HENT: Airway patent and reviewed  Cardiovascular: Normal rate, regular rhythm, normal heart sounds. Pulmonary/Chest: No wheezes. No rhonchi. No rales. Abdominal: Soft. Bowel sounds are normal. No distension. MALLAMPATI:           []   I. Complete visualization of the soft palate           [x]   II. Complete visualization of the uvula            []   III. Visualization of only the base of the uvula           []   IV. Soft palate is not visible     ASA CLASS:         []   I. Normal, healthy adult           [x]   II.  Mild systemic disease            []   III. Severe systemic disease      Sedation plan:   [x]  Local              []  Minimal                  []  General anesthesia    Patient's condition acceptable for planned procedure/sedation. Post Procedure Plan   Return to same level of care   ______________________     The risks and benefits as well as alternatives to the procedure have been discussed with the patient and or family. The patient and or next of kin understands and agrees to proceed.     Katheryn Dubin, M.D.

## 2019-06-24 ENCOUNTER — HOSPITAL ENCOUNTER (OUTPATIENT)
Age: 74
Setting detail: OUTPATIENT SURGERY
Discharge: HOME | End: 2019-06-24
Attending: PHYSICAL MEDICINE & REHABILITATION | Admitting: PHYSICAL MEDICINE & REHABILITATION
Payer: MEDICARE

## 2019-06-24 VITALS
SYSTOLIC BLOOD PRESSURE: 155 MMHG | HEART RATE: 71 BPM | OXYGEN SATURATION: 97 % | TEMPERATURE: 97.3 F | WEIGHT: 151 LBS | HEIGHT: 63 IN | RESPIRATION RATE: 16 BRPM | DIASTOLIC BLOOD PRESSURE: 73 MMHG | BODY MASS INDEX: 26.75 KG/M2

## 2019-06-24 LAB
GLUCOSE BLD-MCNC: 125 MG/DL (ref 70–99)
PERFORMED ON: ABNORMAL

## 2019-06-24 PROCEDURE — 7100000010 HC PHASE II RECOVERY - FIRST 15 MIN: Performed by: PHYSICAL MEDICINE & REHABILITATION

## 2019-06-24 PROCEDURE — 2500000003 HC RX 250 WO HCPCS: Performed by: PHYSICAL MEDICINE & REHABILITATION

## 2019-06-24 PROCEDURE — 6360000002 HC RX W HCPCS: Performed by: PHYSICAL MEDICINE & REHABILITATION

## 2019-06-24 PROCEDURE — 6360000004 HC RX CONTRAST MEDICATION: Performed by: PHYSICAL MEDICINE & REHABILITATION

## 2019-06-24 PROCEDURE — 3600000002 HC SURGERY LEVEL 2 BASE: Performed by: PHYSICAL MEDICINE & REHABILITATION

## 2019-06-24 PROCEDURE — 2709999900 HC NON-CHARGEABLE SUPPLY: Performed by: PHYSICAL MEDICINE & REHABILITATION

## 2019-06-24 RX ORDER — LIDOCAINE HYDROCHLORIDE 10 MG/ML
INJECTION, SOLUTION EPIDURAL; INFILTRATION; INTRACAUDAL; PERINEURAL PRN
Status: DISCONTINUED | OUTPATIENT
Start: 2019-06-24 | End: 2019-06-24 | Stop reason: ALTCHOICE

## 2019-06-24 RX ORDER — DEXAMETHASONE SODIUM PHOSPHATE 10 MG/ML
INJECTION, SOLUTION INTRAMUSCULAR; INTRAVENOUS PRN
Status: DISCONTINUED | OUTPATIENT
Start: 2019-06-24 | End: 2019-06-24 | Stop reason: ALTCHOICE

## 2019-06-24 ASSESSMENT — PAIN - FUNCTIONAL ASSESSMENT
PAIN_FUNCTIONAL_ASSESSMENT: 0-10
PAIN_FUNCTIONAL_ASSESSMENT: PREVENTS OR INTERFERES SOME ACTIVE ACTIVITIES AND ADLS

## 2019-06-24 ASSESSMENT — PAIN DESCRIPTION - DESCRIPTORS: DESCRIPTORS: ACHING

## 2019-06-24 NOTE — OP NOTE
Patient: Isael Angel  Record #:  [id number]   Date:  6/24/2019  Physician:  Gage Moeller M.D. Facility: [facility]       Pre-op diagnosis: Lumbar radiculitis, lumbar spondylosis, lumbar stenosis  Post-op diagnosis:  same  Procedure: L2 3 transforaminal epidural injection with flouroscopic guidance    Procedure Note:    The patient was admitted through pre-op and written consent was obtained. The patient was advised of the risks and benefits of the procedure, including but not limited to the following: bleeding, pain, infection, temporary paralysis, nerve damage and spinal headache. The patient was given the opportunity to ask questions. There were no contraindications for this procedure. The appropriate area was prepped and draped in a sterile fashion. Landmarks were identified and marked. A 23G spinal needle was advanced to the right L2 neural foramen using fluoroscopic guidance with ideal needle tip placement confirmed by multiple views. Injection of contrast showed epidural flow. There were no signs of intravascular or intrathecal injection. 10 mg Dexamethasone and 2 mL lidocaine were then injected. There were no complications and the patient tolerated the procedure well. The patient was transferred to the recovery area and monitored. Discharge instructions were given. The patient is to contact me for any post-procedure concerns. The patient is to follow up as scheduled.     Gage Moeller MD

## 2019-07-18 ENCOUNTER — OFFICE VISIT (OUTPATIENT)
Dept: ORTHOPEDIC SURGERY | Age: 74
End: 2019-07-18
Payer: MEDICARE

## 2019-07-18 VITALS
HEART RATE: 72 BPM | HEIGHT: 63 IN | WEIGHT: 151.01 LBS | BODY MASS INDEX: 26.76 KG/M2 | DIASTOLIC BLOOD PRESSURE: 81 MMHG | SYSTOLIC BLOOD PRESSURE: 140 MMHG

## 2019-07-18 DIAGNOSIS — M48.062 LUMBAR STENOSIS WITH NEUROGENIC CLAUDICATION: Primary | ICD-10-CM

## 2019-07-18 PROCEDURE — 99212 OFFICE O/P EST SF 10 MIN: CPT | Performed by: PHYSICAL MEDICINE & REHABILITATION

## 2019-07-18 PROCEDURE — G8399 PT W/DXA RESULTS DOCUMENT: HCPCS | Performed by: PHYSICAL MEDICINE & REHABILITATION

## 2019-07-18 PROCEDURE — 1090F PRES/ABSN URINE INCON ASSESS: CPT | Performed by: PHYSICAL MEDICINE & REHABILITATION

## 2019-07-18 PROCEDURE — 1123F ACP DISCUSS/DSCN MKR DOCD: CPT | Performed by: PHYSICAL MEDICINE & REHABILITATION

## 2019-07-18 PROCEDURE — 4040F PNEUMOC VAC/ADMIN/RCVD: CPT | Performed by: PHYSICAL MEDICINE & REHABILITATION

## 2019-07-18 PROCEDURE — G8428 CUR MEDS NOT DOCUMENT: HCPCS | Performed by: PHYSICAL MEDICINE & REHABILITATION

## 2019-07-18 PROCEDURE — G8417 CALC BMI ABV UP PARAM F/U: HCPCS | Performed by: PHYSICAL MEDICINE & REHABILITATION

## 2019-07-18 PROCEDURE — 1036F TOBACCO NON-USER: CPT | Performed by: PHYSICAL MEDICINE & REHABILITATION

## 2019-07-18 PROCEDURE — 3017F COLORECTAL CA SCREEN DOC REV: CPT | Performed by: PHYSICAL MEDICINE & REHABILITATION

## 2019-09-11 ENCOUNTER — TELEPHONE (OUTPATIENT)
Dept: FAMILY MEDICINE CLINIC | Age: 74
End: 2019-09-11

## 2019-09-11 RX ORDER — AZITHROMYCIN 250 MG/1
250 TABLET, FILM COATED ORAL SEE ADMIN INSTRUCTIONS
Qty: 6 TABLET | Refills: 0 | Status: SHIPPED | OUTPATIENT
Start: 2019-09-11 | End: 2019-09-16

## 2019-09-11 NOTE — TELEPHONE ENCOUNTER
Patient states she has a cough white sinus drainage No fever or SOB  She is requesting an antibiotic states she has had this called in the past for her

## 2019-09-12 RX ORDER — MELOXICAM 15 MG/1
TABLET ORAL
Qty: 90 TABLET | Refills: 1 | OUTPATIENT
Start: 2019-09-12

## 2019-09-12 RX ORDER — MONTELUKAST SODIUM 10 MG/1
TABLET ORAL
Qty: 90 TABLET | Refills: 1 | OUTPATIENT
Start: 2019-09-12

## 2019-09-25 ENCOUNTER — OFFICE VISIT (OUTPATIENT)
Dept: FAMILY MEDICINE CLINIC | Age: 74
End: 2019-09-25
Payer: MEDICARE

## 2019-09-25 VITALS
TEMPERATURE: 99.2 F | SYSTOLIC BLOOD PRESSURE: 138 MMHG | WEIGHT: 150 LBS | OXYGEN SATURATION: 97 % | DIASTOLIC BLOOD PRESSURE: 75 MMHG | BODY MASS INDEX: 26.58 KG/M2 | HEART RATE: 81 BPM

## 2019-09-25 DIAGNOSIS — M15.9 PRIMARY OSTEOARTHRITIS INVOLVING MULTIPLE JOINTS: ICD-10-CM

## 2019-09-25 DIAGNOSIS — I10 BENIGN ESSENTIAL HYPERTENSION: ICD-10-CM

## 2019-09-25 DIAGNOSIS — K21.9 GASTROESOPHAGEAL REFLUX DISEASE WITHOUT ESOPHAGITIS: ICD-10-CM

## 2019-09-25 DIAGNOSIS — E78.00 PURE HYPERCHOLESTEROLEMIA: ICD-10-CM

## 2019-09-25 DIAGNOSIS — J30.9 ALLERGIC SINUSITIS: ICD-10-CM

## 2019-09-25 DIAGNOSIS — E11.9 CONTROLLED TYPE 2 DIABETES MELLITUS WITHOUT COMPLICATION, WITHOUT LONG-TERM CURRENT USE OF INSULIN (HCC): Primary | ICD-10-CM

## 2019-09-25 LAB — HBA1C MFR BLD: 6.2 %

## 2019-09-25 PROCEDURE — 3017F COLORECTAL CA SCREEN DOC REV: CPT | Performed by: FAMILY MEDICINE

## 2019-09-25 PROCEDURE — G8417 CALC BMI ABV UP PARAM F/U: HCPCS | Performed by: FAMILY MEDICINE

## 2019-09-25 PROCEDURE — 1090F PRES/ABSN URINE INCON ASSESS: CPT | Performed by: FAMILY MEDICINE

## 2019-09-25 PROCEDURE — G8427 DOCREV CUR MEDS BY ELIG CLIN: HCPCS | Performed by: FAMILY MEDICINE

## 2019-09-25 PROCEDURE — 99214 OFFICE O/P EST MOD 30 MIN: CPT | Performed by: FAMILY MEDICINE

## 2019-09-25 PROCEDURE — G8399 PT W/DXA RESULTS DOCUMENT: HCPCS | Performed by: FAMILY MEDICINE

## 2019-09-25 PROCEDURE — 1123F ACP DISCUSS/DSCN MKR DOCD: CPT | Performed by: FAMILY MEDICINE

## 2019-09-25 PROCEDURE — 83036 HEMOGLOBIN GLYCOSYLATED A1C: CPT | Performed by: FAMILY MEDICINE

## 2019-09-25 PROCEDURE — 2022F DILAT RTA XM EVC RTNOPTHY: CPT | Performed by: FAMILY MEDICINE

## 2019-09-25 PROCEDURE — 1036F TOBACCO NON-USER: CPT | Performed by: FAMILY MEDICINE

## 2019-09-25 PROCEDURE — 4040F PNEUMOC VAC/ADMIN/RCVD: CPT | Performed by: FAMILY MEDICINE

## 2019-09-25 PROCEDURE — 3044F HG A1C LEVEL LT 7.0%: CPT | Performed by: FAMILY MEDICINE

## 2019-09-25 RX ORDER — MELOXICAM 15 MG/1
TABLET ORAL
Qty: 90 TABLET | Refills: 1 | Status: SHIPPED | OUTPATIENT
Start: 2019-09-25 | End: 2019-09-25 | Stop reason: SDUPTHER

## 2019-09-25 RX ORDER — PREDNISONE 20 MG/1
40 TABLET ORAL
Qty: 14 TABLET | Refills: 0 | Status: SHIPPED | OUTPATIENT
Start: 2019-09-25 | End: 2020-04-15 | Stop reason: SDUPTHER

## 2019-09-25 RX ORDER — GLUCOSAMINE HCL/CHONDROITIN SU 500-400 MG
CAPSULE ORAL
Qty: 100 STRIP | Refills: 3 | Status: SHIPPED | OUTPATIENT
Start: 2019-09-25 | End: 2021-11-15

## 2019-09-25 RX ORDER — MONTELUKAST SODIUM 10 MG/1
TABLET ORAL
Qty: 90 TABLET | Refills: 1 | Status: SHIPPED | OUTPATIENT
Start: 2019-09-25 | End: 2019-12-23

## 2019-09-25 RX ORDER — LANCETS
1 EACH MISCELLANEOUS DAILY
Qty: 100 EACH | Refills: 3 | Status: SHIPPED | OUTPATIENT
Start: 2019-09-25 | End: 2021-11-15

## 2019-09-25 RX ORDER — MELOXICAM 15 MG/1
TABLET ORAL
Qty: 90 TABLET | Refills: 1 | Status: SHIPPED | OUTPATIENT
Start: 2019-09-25 | End: 2019-12-23

## 2019-09-25 RX ORDER — MONTELUKAST SODIUM 10 MG/1
TABLET ORAL
Qty: 90 TABLET | Refills: 1 | Status: SHIPPED | OUTPATIENT
Start: 2019-09-25 | End: 2019-09-25 | Stop reason: SDUPTHER

## 2019-10-01 ENCOUNTER — NURSE ONLY (OUTPATIENT)
Dept: FAMILY MEDICINE CLINIC | Age: 74
End: 2019-10-01
Payer: MEDICARE

## 2019-10-01 DIAGNOSIS — Z23 NEED FOR INFLUENZA VACCINATION: Primary | ICD-10-CM

## 2019-10-01 DIAGNOSIS — E78.00 PURE HYPERCHOLESTEROLEMIA: ICD-10-CM

## 2019-10-01 LAB
CHOLESTEROL, TOTAL: 193 MG/DL (ref 0–199)
HDLC SERPL-MCNC: 80 MG/DL (ref 40–60)
LDL CHOLESTEROL CALCULATED: 69 MG/DL
TRIGL SERPL-MCNC: 218 MG/DL (ref 0–150)
VLDLC SERPL CALC-MCNC: 44 MG/DL

## 2019-10-01 PROCEDURE — 36415 COLL VENOUS BLD VENIPUNCTURE: CPT | Performed by: FAMILY MEDICINE

## 2019-10-01 PROCEDURE — G0008 ADMIN INFLUENZA VIRUS VAC: HCPCS | Performed by: FAMILY MEDICINE

## 2019-10-01 PROCEDURE — 90653 IIV ADJUVANT VACCINE IM: CPT | Performed by: FAMILY MEDICINE

## 2019-11-14 ENCOUNTER — OFFICE VISIT (OUTPATIENT)
Dept: ORTHOPEDIC SURGERY | Age: 74
End: 2019-11-14
Payer: MEDICARE

## 2019-11-14 VITALS — WEIGHT: 149.91 LBS | BODY MASS INDEX: 26.56 KG/M2 | HEIGHT: 63 IN

## 2019-11-14 DIAGNOSIS — M48.062 LUMBAR STENOSIS WITH NEUROGENIC CLAUDICATION: Primary | ICD-10-CM

## 2019-11-14 DIAGNOSIS — M51.36 DDD (DEGENERATIVE DISC DISEASE), LUMBAR: ICD-10-CM

## 2019-11-14 DIAGNOSIS — M54.16 LUMBAR RADICULITIS: ICD-10-CM

## 2019-11-14 PROCEDURE — 99214 OFFICE O/P EST MOD 30 MIN: CPT | Performed by: PHYSICIAN ASSISTANT

## 2019-11-14 PROCEDURE — 1036F TOBACCO NON-USER: CPT | Performed by: PHYSICIAN ASSISTANT

## 2019-11-14 PROCEDURE — 1090F PRES/ABSN URINE INCON ASSESS: CPT | Performed by: PHYSICIAN ASSISTANT

## 2019-11-14 PROCEDURE — G8427 DOCREV CUR MEDS BY ELIG CLIN: HCPCS | Performed by: PHYSICIAN ASSISTANT

## 2019-11-14 PROCEDURE — G8399 PT W/DXA RESULTS DOCUMENT: HCPCS | Performed by: PHYSICIAN ASSISTANT

## 2019-11-14 PROCEDURE — 4040F PNEUMOC VAC/ADMIN/RCVD: CPT | Performed by: PHYSICIAN ASSISTANT

## 2019-11-14 PROCEDURE — 1123F ACP DISCUSS/DSCN MKR DOCD: CPT | Performed by: PHYSICIAN ASSISTANT

## 2019-11-14 PROCEDURE — G8417 CALC BMI ABV UP PARAM F/U: HCPCS | Performed by: PHYSICIAN ASSISTANT

## 2019-11-14 PROCEDURE — G8482 FLU IMMUNIZE ORDER/ADMIN: HCPCS | Performed by: PHYSICIAN ASSISTANT

## 2019-11-14 PROCEDURE — 3017F COLORECTAL CA SCREEN DOC REV: CPT | Performed by: PHYSICIAN ASSISTANT

## 2019-11-27 ENCOUNTER — TELEPHONE (OUTPATIENT)
Dept: ORTHOPEDIC SURGERY | Age: 74
End: 2019-11-27

## 2019-12-03 ENCOUNTER — HOSPITAL ENCOUNTER (OUTPATIENT)
Age: 74
Setting detail: OUTPATIENT SURGERY
Discharge: HOME | End: 2019-12-03
Attending: PHYSICAL MEDICINE & REHABILITATION | Admitting: PHYSICAL MEDICINE & REHABILITATION
Payer: MEDICARE

## 2019-12-03 VITALS
OXYGEN SATURATION: 98 % | WEIGHT: 150 LBS | SYSTOLIC BLOOD PRESSURE: 137 MMHG | BODY MASS INDEX: 26.58 KG/M2 | DIASTOLIC BLOOD PRESSURE: 77 MMHG | RESPIRATION RATE: 16 BRPM | HEIGHT: 63 IN | HEART RATE: 61 BPM | TEMPERATURE: 97.4 F

## 2019-12-03 LAB
GLUCOSE BLD-MCNC: 122 MG/DL (ref 70–99)
PERFORMED ON: ABNORMAL

## 2019-12-03 PROCEDURE — 7100000010 HC PHASE II RECOVERY - FIRST 15 MIN: Performed by: PHYSICAL MEDICINE & REHABILITATION

## 2019-12-03 PROCEDURE — 3600000002 HC SURGERY LEVEL 2 BASE: Performed by: PHYSICAL MEDICINE & REHABILITATION

## 2019-12-03 PROCEDURE — 3600000012 HC SURGERY LEVEL 2 ADDTL 15MIN: Performed by: PHYSICAL MEDICINE & REHABILITATION

## 2019-12-03 PROCEDURE — 2709999900 HC NON-CHARGEABLE SUPPLY: Performed by: PHYSICAL MEDICINE & REHABILITATION

## 2019-12-03 PROCEDURE — 6360000004 HC RX CONTRAST MEDICATION: Performed by: PHYSICAL MEDICINE & REHABILITATION

## 2019-12-03 PROCEDURE — 7100000011 HC PHASE II RECOVERY - ADDTL 15 MIN: Performed by: PHYSICAL MEDICINE & REHABILITATION

## 2019-12-03 PROCEDURE — 6360000002 HC RX W HCPCS: Performed by: PHYSICAL MEDICINE & REHABILITATION

## 2019-12-03 PROCEDURE — 2500000003 HC RX 250 WO HCPCS: Performed by: PHYSICAL MEDICINE & REHABILITATION

## 2019-12-03 RX ORDER — DEXAMETHASONE SODIUM PHOSPHATE 10 MG/ML
INJECTION, SOLUTION INTRAMUSCULAR; INTRAVENOUS PRN
Status: DISCONTINUED | OUTPATIENT
Start: 2019-12-03 | End: 2019-12-03 | Stop reason: ALTCHOICE

## 2019-12-03 RX ORDER — LIDOCAINE HYDROCHLORIDE 10 MG/ML
INJECTION, SOLUTION EPIDURAL; INFILTRATION; INTRACAUDAL; PERINEURAL PRN
Status: DISCONTINUED | OUTPATIENT
Start: 2019-12-03 | End: 2019-12-03 | Stop reason: ALTCHOICE

## 2019-12-03 ASSESSMENT — PAIN SCALES - GENERAL: PAINLEVEL_OUTOF10: 0

## 2019-12-03 ASSESSMENT — PAIN DESCRIPTION - DESCRIPTORS: DESCRIPTORS: ACHING

## 2019-12-17 ENCOUNTER — OFFICE VISIT (OUTPATIENT)
Dept: FAMILY MEDICINE CLINIC | Age: 74
End: 2019-12-17
Payer: MEDICARE

## 2019-12-17 VITALS
TEMPERATURE: 97.6 F | SYSTOLIC BLOOD PRESSURE: 138 MMHG | OXYGEN SATURATION: 99 % | BODY MASS INDEX: 27.1 KG/M2 | WEIGHT: 153 LBS | DIASTOLIC BLOOD PRESSURE: 78 MMHG | HEART RATE: 71 BPM

## 2019-12-17 DIAGNOSIS — K58.0 IRRITABLE BOWEL SYNDROME WITH DIARRHEA: ICD-10-CM

## 2019-12-17 DIAGNOSIS — I10 BENIGN ESSENTIAL HYPERTENSION: ICD-10-CM

## 2019-12-17 DIAGNOSIS — H93.13 TINNITUS OF BOTH EARS: ICD-10-CM

## 2019-12-17 DIAGNOSIS — K21.9 GASTROESOPHAGEAL REFLUX DISEASE WITHOUT ESOPHAGITIS: ICD-10-CM

## 2019-12-17 DIAGNOSIS — E11.9 CONTROLLED TYPE 2 DIABETES MELLITUS WITHOUT COMPLICATION, WITHOUT LONG-TERM CURRENT USE OF INSULIN (HCC): Primary | ICD-10-CM

## 2019-12-17 LAB
A/G RATIO: 1.9 (ref 1.1–2.2)
ALBUMIN SERPL-MCNC: 4.4 G/DL (ref 3.4–5)
ALP BLD-CCNC: 72 U/L (ref 40–129)
ALT SERPL-CCNC: 10 U/L (ref 10–40)
ANION GAP SERPL CALCULATED.3IONS-SCNC: 17 MMOL/L (ref 3–16)
AST SERPL-CCNC: 11 U/L (ref 15–37)
BASOPHILS ABSOLUTE: 0.1 K/UL (ref 0–0.2)
BASOPHILS RELATIVE PERCENT: 1.1 %
BILIRUB SERPL-MCNC: 0.5 MG/DL (ref 0–1)
BUN BLDV-MCNC: 20 MG/DL (ref 7–20)
CALCIUM SERPL-MCNC: 9.8 MG/DL (ref 8.3–10.6)
CHLORIDE BLD-SCNC: 103 MMOL/L (ref 99–110)
CO2: 23 MMOL/L (ref 21–32)
CREAT SERPL-MCNC: 0.6 MG/DL (ref 0.6–1.2)
EOSINOPHILS ABSOLUTE: 0.1 K/UL (ref 0–0.6)
EOSINOPHILS RELATIVE PERCENT: 2.2 %
GFR AFRICAN AMERICAN: >60
GFR NON-AFRICAN AMERICAN: >60
GLOBULIN: 2.3 G/DL
GLUCOSE BLD-MCNC: 115 MG/DL (ref 70–99)
HBA1C MFR BLD: 6.6 %
HCT VFR BLD CALC: 41.4 % (ref 36–48)
HEMOGLOBIN: 14 G/DL (ref 12–16)
LYMPHOCYTES ABSOLUTE: 1.6 K/UL (ref 1–5.1)
LYMPHOCYTES RELATIVE PERCENT: 25.3 %
MCH RBC QN AUTO: 31.2 PG (ref 26–34)
MCHC RBC AUTO-ENTMCNC: 33.7 G/DL (ref 31–36)
MCV RBC AUTO: 92.5 FL (ref 80–100)
MONOCYTES ABSOLUTE: 0.5 K/UL (ref 0–1.3)
MONOCYTES RELATIVE PERCENT: 7.5 %
NEUTROPHILS ABSOLUTE: 4.1 K/UL (ref 1.7–7.7)
NEUTROPHILS RELATIVE PERCENT: 63.9 %
PDW BLD-RTO: 13 % (ref 12.4–15.4)
PLATELET # BLD: 261 K/UL (ref 135–450)
PMV BLD AUTO: 9.8 FL (ref 5–10.5)
POTASSIUM SERPL-SCNC: 4.1 MMOL/L (ref 3.5–5.1)
RBC # BLD: 4.47 M/UL (ref 4–5.2)
SODIUM BLD-SCNC: 143 MMOL/L (ref 136–145)
TOTAL PROTEIN: 6.7 G/DL (ref 6.4–8.2)
WBC # BLD: 6.4 K/UL (ref 4–11)

## 2019-12-17 PROCEDURE — 99214 OFFICE O/P EST MOD 30 MIN: CPT | Performed by: FAMILY MEDICINE

## 2019-12-17 PROCEDURE — 1036F TOBACCO NON-USER: CPT | Performed by: FAMILY MEDICINE

## 2019-12-17 PROCEDURE — 3017F COLORECTAL CA SCREEN DOC REV: CPT | Performed by: FAMILY MEDICINE

## 2019-12-17 PROCEDURE — 4040F PNEUMOC VAC/ADMIN/RCVD: CPT | Performed by: FAMILY MEDICINE

## 2019-12-17 PROCEDURE — G8417 CALC BMI ABV UP PARAM F/U: HCPCS | Performed by: FAMILY MEDICINE

## 2019-12-17 PROCEDURE — 2022F DILAT RTA XM EVC RTNOPTHY: CPT | Performed by: FAMILY MEDICINE

## 2019-12-17 PROCEDURE — G8399 PT W/DXA RESULTS DOCUMENT: HCPCS | Performed by: FAMILY MEDICINE

## 2019-12-17 PROCEDURE — 1123F ACP DISCUSS/DSCN MKR DOCD: CPT | Performed by: FAMILY MEDICINE

## 2019-12-17 PROCEDURE — 36415 COLL VENOUS BLD VENIPUNCTURE: CPT | Performed by: FAMILY MEDICINE

## 2019-12-17 PROCEDURE — 83036 HEMOGLOBIN GLYCOSYLATED A1C: CPT | Performed by: FAMILY MEDICINE

## 2019-12-17 PROCEDURE — G8427 DOCREV CUR MEDS BY ELIG CLIN: HCPCS | Performed by: FAMILY MEDICINE

## 2019-12-17 PROCEDURE — 3044F HG A1C LEVEL LT 7.0%: CPT | Performed by: FAMILY MEDICINE

## 2019-12-17 PROCEDURE — G8482 FLU IMMUNIZE ORDER/ADMIN: HCPCS | Performed by: FAMILY MEDICINE

## 2019-12-17 PROCEDURE — 1090F PRES/ABSN URINE INCON ASSESS: CPT | Performed by: FAMILY MEDICINE

## 2019-12-23 RX ORDER — CHLORTHALIDONE 25 MG/1
TABLET ORAL
Qty: 90 TABLET | Refills: 1 | Status: SHIPPED | OUTPATIENT
Start: 2019-12-23 | End: 2020-05-11

## 2019-12-23 RX ORDER — MELOXICAM 15 MG/1
TABLET ORAL
Qty: 90 TABLET | Refills: 1 | Status: SHIPPED | OUTPATIENT
Start: 2019-12-23 | End: 2020-07-14

## 2019-12-23 RX ORDER — METFORMIN HYDROCHLORIDE 500 MG/1
TABLET, EXTENDED RELEASE ORAL
Qty: 180 TABLET | Refills: 1 | Status: SHIPPED | OUTPATIENT
Start: 2019-12-23 | End: 2020-05-11

## 2019-12-23 RX ORDER — MONTELUKAST SODIUM 10 MG/1
TABLET ORAL
Qty: 90 TABLET | Refills: 1 | Status: SHIPPED | OUTPATIENT
Start: 2019-12-23 | End: 2020-07-14

## 2020-01-29 RX ORDER — BUDESONIDE AND FORMOTEROL FUMARATE DIHYDRATE 160; 4.5 UG/1; UG/1
AEROSOL RESPIRATORY (INHALATION)
Qty: 30.6 G | Refills: 1 | Status: SHIPPED | OUTPATIENT
Start: 2020-01-29 | End: 2020-07-16

## 2020-04-15 ENCOUNTER — VIRTUAL VISIT (OUTPATIENT)
Dept: FAMILY MEDICINE CLINIC | Age: 75
End: 2020-04-15
Payer: MEDICARE

## 2020-04-15 PROCEDURE — 99213 OFFICE O/P EST LOW 20 MIN: CPT | Performed by: FAMILY MEDICINE

## 2020-04-15 PROCEDURE — 1123F ACP DISCUSS/DSCN MKR DOCD: CPT | Performed by: FAMILY MEDICINE

## 2020-04-15 PROCEDURE — G8427 DOCREV CUR MEDS BY ELIG CLIN: HCPCS | Performed by: FAMILY MEDICINE

## 2020-04-15 PROCEDURE — 4040F PNEUMOC VAC/ADMIN/RCVD: CPT | Performed by: FAMILY MEDICINE

## 2020-04-15 PROCEDURE — 1090F PRES/ABSN URINE INCON ASSESS: CPT | Performed by: FAMILY MEDICINE

## 2020-04-15 PROCEDURE — 3017F COLORECTAL CA SCREEN DOC REV: CPT | Performed by: FAMILY MEDICINE

## 2020-04-15 PROCEDURE — G8399 PT W/DXA RESULTS DOCUMENT: HCPCS | Performed by: FAMILY MEDICINE

## 2020-04-15 RX ORDER — PREDNISONE 20 MG/1
40 TABLET ORAL
Qty: 14 TABLET | Refills: 0 | Status: SHIPPED | OUTPATIENT
Start: 2020-04-15 | End: 2021-11-15 | Stop reason: SDUPTHER

## 2020-04-15 ASSESSMENT — PATIENT HEALTH QUESTIONNAIRE - PHQ9
1. LITTLE INTEREST OR PLEASURE IN DOING THINGS: 0
SUM OF ALL RESPONSES TO PHQ QUESTIONS 1-9: 0
2. FEELING DOWN, DEPRESSED OR HOPELESS: 0
SUM OF ALL RESPONSES TO PHQ9 QUESTIONS 1 & 2: 0
SUM OF ALL RESPONSES TO PHQ QUESTIONS 1-9: 0

## 2020-04-15 NOTE — PROGRESS NOTES
Neurological:            No Facial Asymmetry (Cranial nerve 7 motor function) (limited exam to video visit)     No gaze palsy              Skin:   No significant exanthematous lesions or discoloration noted on facial skin        Due to this being a TeleHealth encounter, evaluation of the following organ systems is limited: Vitals/Constitutional/EENT/Resp/CV/GI//MS/Neuro/Skin/Heme-Lymph-Imm.  [id number]}  Prior to Visit Medications    Medication Sig Taking?  Authorizing Provider   predniSONE (DELTASONE) 20 MG tablet Take 2 tablets by mouth daily (with breakfast) for 7 days Take with food Yes Marya Reed MD   SYMBICORT 160-4.5 MCG/ACT AERO USE 2 PUFFS TWO TIMES DAILY Yes Marya Reed MD   meloxicam (MOBIC) 15 MG tablet TAKE 1 TABLET BY MOUTH  DAILY Yes Marya Reed MD   montelukast (SINGULAIR) 10 MG tablet TAKE 1 TABLET BY MOUTH  NIGHTLY Yes Marya Reed MD   metFORMIN (GLUCOPHAGE-XR) 500 MG extended release tablet TAKE 1 TABLET BY MOUTH TWO  TIMES DAILY Yes Marya Reed MD   chlorthalidone (HYGROTON) 25 MG tablet TAKE 1 TABLET BY MOUTH  DAILY Yes Marya Reed MD   [employer] Lancets Ultra Thin MISC 1 each by Does not apply route daily Yes Marya Reed MD   blood glucose monitor strips Test 1 times a day Yes Marya Reed MD   pravastatin (PRAVACHOL) 10 MG tablet TAKE 1 TABLET BY MOUTH  DAILY Yes Marya Reed MD   cetirizine-psuedoephedrine (ZYRTEC-D) 5-120 MG per extended release tablet Take 1 tablet by mouth 2 times daily Yes Marya Reed MD   albuterol sulfate HFA (PROAIR HFA) 108 (90 Base) MCG/ACT inhaler Inhale 1-2 puffs into the lungs every 4 hours as needed for Wheezing or Shortness of Breath Yes Marya Reed MD   albuterol (ACCUNEB) 1.25 MG/3ML nebulizer solution Inhale 3 mLs into the lungs every 4 hours as needed for Wheezing or Shortness of Breath DX: J45.20 Yes Marya Reed MD   dicyclomine (BENTYL) 10 MG capsule Take 1-2 capsules by mouth 3 times daily (before meals) Yes Marya Reed MD

## 2020-05-11 RX ORDER — PRAVASTATIN SODIUM 10 MG
TABLET ORAL
Qty: 90 TABLET | Refills: 3 | Status: SHIPPED | OUTPATIENT
Start: 2020-05-11 | End: 2021-08-03 | Stop reason: DRUGHIGH

## 2020-05-11 RX ORDER — METFORMIN HYDROCHLORIDE 500 MG/1
TABLET, EXTENDED RELEASE ORAL
Qty: 180 TABLET | Refills: 1 | Status: SHIPPED | OUTPATIENT
Start: 2020-05-11 | End: 2020-10-26

## 2020-05-11 RX ORDER — CHLORTHALIDONE 25 MG/1
TABLET ORAL
Qty: 90 TABLET | Refills: 1 | Status: SHIPPED | OUTPATIENT
Start: 2020-05-11 | End: 2020-10-26

## 2020-05-28 ENCOUNTER — NURSE ONLY (OUTPATIENT)
Dept: FAMILY MEDICINE CLINIC | Age: 75
End: 2020-05-28
Payer: MEDICARE

## 2020-05-28 LAB
BILIRUBIN, POC: ABNORMAL
BLOOD URINE, POC: ABNORMAL
CLARITY, POC: ABNORMAL
COLOR, POC: YELLOW
GLUCOSE URINE, POC: ABNORMAL
KETONES, POC: ABNORMAL
LEUKOCYTE EST, POC: ABNORMAL
NITRITE, POC: POSITIVE
PH, POC: 5.5
PROTEIN, POC: ABNORMAL
SPECIFIC GRAVITY, POC: 1.02
UROBILINOGEN, POC: 0.2

## 2020-05-28 PROCEDURE — 81002 URINALYSIS NONAUTO W/O SCOPE: CPT | Performed by: FAMILY MEDICINE

## 2020-05-29 ENCOUNTER — VIRTUAL VISIT (OUTPATIENT)
Dept: FAMILY MEDICINE CLINIC | Age: 75
End: 2020-05-29
Payer: MEDICARE

## 2020-05-29 PROCEDURE — 99213 OFFICE O/P EST LOW 20 MIN: CPT | Performed by: NURSE PRACTITIONER

## 2020-05-29 RX ORDER — CEPHALEXIN 500 MG/1
500 CAPSULE ORAL 2 TIMES DAILY
Qty: 14 CAPSULE | Refills: 0 | Status: SHIPPED | OUTPATIENT
Start: 2020-05-29 | End: 2020-06-17 | Stop reason: ALTCHOICE

## 2020-05-29 ASSESSMENT — ENCOUNTER SYMPTOMS
NAUSEA: 0
ABDOMINAL DISTENTION: 0
CHEST TIGHTNESS: 0
BACK PAIN: 1
RHINORRHEA: 0
DIARRHEA: 0
SORE THROAT: 0
COUGH: 0
SHORTNESS OF BREATH: 0
WHEEZING: 0
VOMITING: 0
ABDOMINAL PAIN: 0

## 2020-05-29 NOTE — PROGRESS NOTES
2020    TELEHEALTH EVALUATION -- Audio/Visual (During LOY- public health emergency)    HPI:    Abebe Carty (:  1945) has requested an audio/video evaluation for the following concern(s): Strong odor to urine with slow stream, urgency and frequency. Patient reports that in February while she was in [place] she had similar symptoms and was placed on an antibiotic at that time for UTI. Patient reports that she took the antibiotic for 10 days and felt well for short time afterwards. Patient reports that she is noticed over the past month symptoms returning. No dizziness or lightheadedness. No chest pain or shortness of breath. No abdominal pain, cramping, or bloating. No fever or chills. No nausea, vomiting, or diarrhea. Patient reports occasional low back discomfort and feeling tired. No unusual unexplained weight loss or severe fatigue. No numbness and tingling in extremities. Patient denies any changes in medications. Review of Systems   Constitutional: Positive for fatigue. Negative for activity change, appetite change, chills and fever. HENT: Negative for congestion, rhinorrhea and sore throat. Eyes: Negative for visual disturbance. Respiratory: Negative for cough, chest tightness, shortness of breath and wheezing. Cardiovascular: Negative for chest pain and leg swelling. Gastrointestinal: Negative for abdominal distention, abdominal pain, diarrhea, nausea and vomiting. Genitourinary: Positive for difficulty urinating, frequency and urgency. Negative for dysuria and hematuria. Musculoskeletal: Positive for back pain. Negative for gait problem and myalgias. Skin: Negative for rash. Neurological: Negative for dizziness, weakness, light-headedness, numbness and headaches. Psychiatric/Behavioral: Negative for sleep disturbance. Prior to Visit Medications    Medication Sig Taking?  Authorizing Provider   cephALEXin (KEFLEX) 500 MG capsule Take 1 capsule by resistance we will change antibiotic at that time. Patient verbalized and agrees with plan of care at this time. Patient instructed to follow-up for any new or worsening symptoms. - cephALEXin (KEFLEX) 500 MG capsule; Take 1 capsule by mouth 2 times daily  Dispense: 14 capsule; Refill: 0      Return if symptoms worsen or fail to improve. Juan A Barber is a 70 y.o. female being evaluated by a Virtual Visit (video visit) encounter to address concerns as mentioned above. A caregiver was present when appropriate. Due to this being a TeleHealth encounter ([facility] public [facility] emergency), evaluation of the following organ systems was limited: Vitals/Constitutional/EENT/Resp/CV/GI//MS/Neuro/Skin/Heme-Lymph-Imm. Pursuant to the emergency declaration under the [address] [place] [address], [address] [place] [address] authority and the "[employer] and [employer] Act, this Virtual Visit was conducted with patient's (and/or legal guardian's) consent, to reduce the patient's risk of exposure to COVID-19 and provide necessary medical care. The patient (and/or legal guardian) has also been advised to contact this office for worsening conditions or problems, and seek emergency medical treatment and/or call 911 if deemed necessary. Patient identification was verified at the start of the visit: Yes    Total time spent on this encounter: 15    Services were provided through a video synchronous discussion virtually to substitute for in-person clinic visit. Patient and provider were located at their individual homes. --GIULIANO Cortes - CNP on 5/29/2020 at 8:49 AM    An electronic signature was used to authenticate this note.

## 2020-05-31 LAB
ORGANISM: ABNORMAL
URINE CULTURE, ROUTINE: ABNORMAL

## 2020-06-17 ENCOUNTER — OFFICE VISIT (OUTPATIENT)
Dept: FAMILY MEDICINE CLINIC | Age: 75
End: 2020-06-17
Payer: MEDICARE

## 2020-06-17 VITALS
HEART RATE: 64 BPM | DIASTOLIC BLOOD PRESSURE: 76 MMHG | BODY MASS INDEX: 27.1 KG/M2 | TEMPERATURE: 97 F | SYSTOLIC BLOOD PRESSURE: 124 MMHG | WEIGHT: 153 LBS | OXYGEN SATURATION: 97 %

## 2020-06-17 LAB
A/G RATIO: 2.1 (ref 1.1–2.2)
ALBUMIN SERPL-MCNC: 4.2 G/DL (ref 3.4–5)
ALP BLD-CCNC: 70 U/L (ref 40–129)
ALT SERPL-CCNC: 14 U/L (ref 10–40)
ANION GAP SERPL CALCULATED.3IONS-SCNC: 11 MMOL/L (ref 3–16)
AST SERPL-CCNC: 14 U/L (ref 15–37)
BASOPHILS ABSOLUTE: 0 K/UL (ref 0–0.2)
BASOPHILS RELATIVE PERCENT: 1 %
BILIRUB SERPL-MCNC: 0.5 MG/DL (ref 0–1)
BUN BLDV-MCNC: 19 MG/DL (ref 7–20)
CALCIUM SERPL-MCNC: 8.8 MG/DL (ref 8.3–10.6)
CHLORIDE BLD-SCNC: 105 MMOL/L (ref 99–110)
CHOLESTEROL, TOTAL: 209 MG/DL (ref 0–199)
CO2: 24 MMOL/L (ref 21–32)
CREAT SERPL-MCNC: 0.6 MG/DL (ref 0.6–1.2)
CREATININE URINE POCT: 100
EOSINOPHILS ABSOLUTE: 0.1 K/UL (ref 0–0.6)
EOSINOPHILS RELATIVE PERCENT: 3 %
GFR AFRICAN AMERICAN: >60
GFR NON-AFRICAN AMERICAN: >60
GLOBULIN: 2 G/DL
GLUCOSE BLD-MCNC: 97 MG/DL (ref 70–99)
HBA1C MFR BLD: 6.7 %
HCT VFR BLD CALC: 41 % (ref 36–48)
HDLC SERPL-MCNC: 60 MG/DL (ref 40–60)
HEMOGLOBIN: 13.7 G/DL (ref 12–16)
LDL CHOLESTEROL CALCULATED: 103 MG/DL
LYMPHOCYTES ABSOLUTE: 1.2 K/UL (ref 1–5.1)
LYMPHOCYTES RELATIVE PERCENT: 28.4 %
MCH RBC QN AUTO: 31 PG (ref 26–34)
MCHC RBC AUTO-ENTMCNC: 33.3 G/DL (ref 31–36)
MCV RBC AUTO: 93 FL (ref 80–100)
MICROALBUMIN/CREAT 24H UR: 10 MG/G{CREAT}
MICROALBUMIN/CREAT UR-RTO: <30
MONOCYTES ABSOLUTE: 0.4 K/UL (ref 0–1.3)
MONOCYTES RELATIVE PERCENT: 9.5 %
NEUTROPHILS ABSOLUTE: 2.4 K/UL (ref 1.7–7.7)
NEUTROPHILS RELATIVE PERCENT: 58.1 %
PDW BLD-RTO: 13.1 % (ref 12.4–15.4)
PLATELET # BLD: 236 K/UL (ref 135–450)
PMV BLD AUTO: 9.4 FL (ref 5–10.5)
POTASSIUM SERPL-SCNC: 4.4 MMOL/L (ref 3.5–5.1)
RBC # BLD: 4.41 M/UL (ref 4–5.2)
SODIUM BLD-SCNC: 140 MMOL/L (ref 136–145)
TOTAL PROTEIN: 6.2 G/DL (ref 6.4–8.2)
TRIGL SERPL-MCNC: 229 MG/DL (ref 0–150)
VITAMIN B-12: 318 PG/ML (ref 211–911)
VITAMIN D 25-HYDROXY: 36.8 NG/ML
VLDLC SERPL CALC-MCNC: 46 MG/DL
WBC # BLD: 4.1 K/UL (ref 4–11)

## 2020-06-17 PROCEDURE — G8427 DOCREV CUR MEDS BY ELIG CLIN: HCPCS | Performed by: FAMILY MEDICINE

## 2020-06-17 PROCEDURE — 82044 UR ALBUMIN SEMIQUANTITATIVE: CPT | Performed by: FAMILY MEDICINE

## 2020-06-17 PROCEDURE — 1123F ACP DISCUSS/DSCN MKR DOCD: CPT | Performed by: FAMILY MEDICINE

## 2020-06-17 PROCEDURE — 3017F COLORECTAL CA SCREEN DOC REV: CPT | Performed by: FAMILY MEDICINE

## 2020-06-17 PROCEDURE — 1036F TOBACCO NON-USER: CPT | Performed by: FAMILY MEDICINE

## 2020-06-17 PROCEDURE — 83036 HEMOGLOBIN GLYCOSYLATED A1C: CPT | Performed by: FAMILY MEDICINE

## 2020-06-17 PROCEDURE — 2022F DILAT RTA XM EVC RTNOPTHY: CPT | Performed by: FAMILY MEDICINE

## 2020-06-17 PROCEDURE — G8417 CALC BMI ABV UP PARAM F/U: HCPCS | Performed by: FAMILY MEDICINE

## 2020-06-17 PROCEDURE — 99214 OFFICE O/P EST MOD 30 MIN: CPT | Performed by: FAMILY MEDICINE

## 2020-06-17 PROCEDURE — 3044F HG A1C LEVEL LT 7.0%: CPT | Performed by: FAMILY MEDICINE

## 2020-06-17 PROCEDURE — 4040F PNEUMOC VAC/ADMIN/RCVD: CPT | Performed by: FAMILY MEDICINE

## 2020-06-17 PROCEDURE — G8399 PT W/DXA RESULTS DOCUMENT: HCPCS | Performed by: FAMILY MEDICINE

## 2020-06-17 PROCEDURE — 1090F PRES/ABSN URINE INCON ASSESS: CPT | Performed by: FAMILY MEDICINE

## 2020-06-17 RX ORDER — NORTRIPTYLINE HYDROCHLORIDE 10 MG/1
10 CAPSULE ORAL 3 TIMES DAILY PRN
Qty: 90 CAPSULE | Refills: 3 | Status: SHIPPED | OUTPATIENT
Start: 2020-06-17 | End: 2020-12-07

## 2020-06-17 NOTE — PROGRESS NOTES
tablet 1    montelukast (SINGULAIR) 10 MG tablet TAKE 1 TABLET BY MOUTH  NIGHTLY 90 tablet 1    [employer] Lancets Ultra Thin MISC 1 each by Does not apply route daily 100 each 3    blood glucose monitor strips Test 1 times a day 100 strip 3    cetirizine-psuedoephedrine (ZYRTEC-D) 5-120 MG per extended release tablet Take 1 tablet by mouth 2 times daily 60 tablet 5    albuterol sulfate HFA (PROAIR HFA) 108 (90 Base) MCG/ACT inhaler Inhale 1-2 puffs into the lungs every 4 hours as needed for Wheezing or Shortness of Breath 6 Inhaler 0    albuterol (ACCUNEB) 1.25 MG/3ML nebulizer solution Inhale 3 mLs into the lungs every 4 hours as needed for Wheezing or Shortness of Breath DX: J45.20 25 vial 1    dicyclomine (BENTYL) 10 MG capsule Take 1-2 capsules by mouth 3 times daily (before meals) 180 capsule 5    Acetaminophen (TYLENOL PO) Take  by mouth.  Vitamin D (CHOLECALCIFEROL) 1000 UNITS CAPS capsule Take 2,000 Units by mouth daily.  fluticasone (FLONASE) 50 MCG/ACT nasal spray 1-2 sprays by Nasal route nightly. 3 Bottle 1     No current facility-administered medications for this visit.         Patient Active Problem List    Diagnosis Date Noted    Gastroesophageal reflux disease without esophagitis 10/12/2015     Priority: High    Moderate intermittent asthma without complication 04/73/0032     Priority: High    Controlled type 2 diabetes mellitus without complication, without long-term current use of insulin ([facility]      Priority: High     11/28/2016 last B12: 6/17/2020         Benign essential hypertension      Priority: High    Allergic sinusitis      Priority: Medium    Primary osteoarthritis involving multiple joints      Priority: Medium    Pure hypercholesterolemia      Priority: Medium     ; pravastatin 20 mg causes myalgias, better with coenzyme Q 10      Dyshidrotic eczema 11/28/2016     Priority: Low    Irritable bowel syndrome with diarrhea 06/06/2016     Priority: Low    Vitamin D deficiency      Priority: Low     Level: 11/11/16      Chronic right-sided low back pain with right-sided sciatica 05/03/2019    Right inguinal hernia     Vertigo 05/23/2017    Osteopenia      Stable 8/18, repeat 8/21         Allergies   Allergen Reactions    Flagyl [Metronidazole]      hives    Daypro [Oxaprozin]     Sulfa Antibiotics     Tramadol      drowsy    Amlodipine      constipation    Lisinopril      Cough         Health Maintenance   Topic Date Due    Shingles Vaccine (3 of 3) 08/07/2018    Annual Wellness Visit (AWV)  05/29/2019    Diabetic foot exam  05/03/2020    Diabetic microalbuminuria test  05/03/2020    Diabetic retinal exam  07/18/2020    Lipid screen  10/01/2020    A1C test (Diabetic or Prediabetic)  12/17/2020    Potassium monitoring  12/17/2020    Creatinine monitoring  12/17/2020    Breast cancer screen  05/30/2021    DEXA (modify frequency per FRAX score)  08/20/2021    DTaP/Tdap/Td vaccine (2 - Td) 12/12/2021    Colon cancer screen colonoscopy  08/03/2027    Flu vaccine  Completed    Pneumococcal 65+ years Vaccine  Completed    Hepatitis C screen  Addressed    Hepatitis A vaccine  Aged Out    Hib vaccine  Aged Out    Meningococcal (ACWY) vaccine  Aged Out       The note was completed using Dragon voice recognition transcription. Every effort was made to ensure accuracy; however, inadvertent  transcription errors may be present despite my best efforts to edit errors.

## 2020-07-14 RX ORDER — MELOXICAM 15 MG/1
TABLET ORAL
Qty: 90 TABLET | Refills: 1 | Status: SHIPPED | OUTPATIENT
Start: 2020-07-14 | End: 2020-12-10

## 2020-07-14 RX ORDER — MONTELUKAST SODIUM 10 MG/1
TABLET ORAL
Qty: 90 TABLET | Refills: 1 | Status: SHIPPED | OUTPATIENT
Start: 2020-07-14 | End: 2020-09-14

## 2020-07-16 RX ORDER — BUDESONIDE AND FORMOTEROL FUMARATE DIHYDRATE 160; 4.5 UG/1; UG/1
AEROSOL RESPIRATORY (INHALATION)
Qty: 30.6 G | Refills: 1 | Status: SHIPPED | OUTPATIENT
Start: 2020-07-16 | End: 2020-12-07

## 2020-09-14 ENCOUNTER — OFFICE VISIT (OUTPATIENT)
Dept: FAMILY MEDICINE CLINIC | Age: 75
End: 2020-09-14
Payer: MEDICARE

## 2020-09-14 VITALS
OXYGEN SATURATION: 99 % | TEMPERATURE: 98.1 F | HEART RATE: 68 BPM | BODY MASS INDEX: 26.99 KG/M2 | SYSTOLIC BLOOD PRESSURE: 145 MMHG | DIASTOLIC BLOOD PRESSURE: 76 MMHG | WEIGHT: 152.38 LBS

## 2020-09-14 PROCEDURE — 99214 OFFICE O/P EST MOD 30 MIN: CPT | Performed by: NURSE PRACTITIONER

## 2020-09-14 ASSESSMENT — ENCOUNTER SYMPTOMS
SHORTNESS OF BREATH: 0
ABDOMINAL PAIN: 0
WHEEZING: 0
CHEST TIGHTNESS: 0
COUGH: 0
RHINORRHEA: 0
DIARRHEA: 1
VOMITING: 0
SORE THROAT: 0
ABDOMINAL DISTENTION: 0
NAUSEA: 0

## 2020-09-14 NOTE — PROGRESS NOTES
CHIEF COMPLAINT  Chief Complaint   Patient presents with    Irritable Bowel Syndrome        HPI   Paty Méndez is a 70 y.o. female who presents to the office flareup of IBS. Patient reports that she has had irritable syndrome for years but reports that diarrheal episodes have increased and worsened. Patient reports in the past she has taken Bentyl. Patient denies any abdominal noting. Patient reports intermittent cramping. No dizziness or lightheadedness. No chest pain or shortness of breath. No fever or chills. No dark or tarry stools. Patient reports that some days she will have multiple episodes other days a maybe 1 or 2. Patient reports that she notices it mostly in the mornings. Patient reports that it is difficult to go and plan her day because of frequent episodes. Patient reports that she has noticed oily film in the stool/toilet. Patient denies any significant weight loss. No urinary complaints. Last colonoscopy 2017 revealed some diverticulosis. No other complaints, modifying factors or associated symptoms. Nursing notes reviewed.    Past Medical History:   Diagnosis Date    Chest pain NEC 7/04    negative GXT/cardiolite    Depression     1997    Hyperlipemia     Routine health maintenance     GYN--TAC(6/07)     Past Surgical History:   Procedure Laterality Date    APPENDECTOMY      COLONOSCOPY  08/03/2017    diverticulosis    DILATION AND CURETTAGE OF UTERUS      EPIDURAL STEROID INJECTION Right 6/24/2019    RIGHT LUMBAR TWO AND RIGHT LUMBAR FIVE SACRAL ONE EPIDURAL STEROID INJECTION SITE CONFIRMED BY FLUOROSCOPY performed by Julieth Montemayor MD at [facility] Right 12/3/2019    RIGHT LUMBAR TWO LUMBAR THREE TRANSFORMINAL EPIDURAL STEROID INJECTION AND RIGHT LUMBAR FIVE SACRAL ONE EPIDURAL STEROID INJECTION SITE CONFIRMED BY FLUOROSCOPY performed by Julieth Montemayor MD at [place] [id number] Right 10/17/2017    ROBOTIC, RIGHT INGUINAL HERNIA REPAIR WITH MESH    ROTATOR CUFF REPAIR  2002, 3/2011    [facility]     Family History   Problem Relation Age of Onset    Diabetes Father     Arthritis Sister      Social History     Socioeconomic History    Marital status:      Spouse name: Not on file    Number of children: Not on file    Years of education: Not on file    Highest education level: Not on file   Occupational History    Not on file   Social Needs    Financial resource strain: Not on file    Food insecurity     Worry: Not on file     Inability: Not on file   [employer] needs     Medical: Not on file     Non-medical: Not on file   Tobacco Use    Smoking status: Never Smoker    Smokeless tobacco: Never Used   Substance and Sexual Activity    Alcohol use: Yes     Comment: social    Drug use: No    Sexual activity: Not on file   Lifestyle    Physical activity     Days per week: Not on file     Minutes per session: Not on file    Stress: Not on file   Relationships    Social connections     Talks on phone: Not on file     Gets together: Not on file     Attends Taoism service: Not on file     Active member of club or organization: Not on file     Attends meetings of clubs or organizations: Not on file     Relationship status: Not on file    Intimate partner violence     Fear of current or ex partner: Not on file     Emotionally abused: Not on file     Physically abused: Not on file     Forced sexual activity: Not on file   Other Topics Concern    Not on file   Social History Narrative    Not on file     Current Outpatient Medications   Medication Sig Dispense Refill    SYMBICORT 160-4.5 MCG/ACT AERO USE 2 PUFFS BY MOUTH TWO  TIMES DAILY 30.6 g 1    meloxicam (MOBIC) 15 MG tablet TAKE 1 TABLET BY MOUTH  DAILY 90 tablet 1    nortriptyline (PAMELOR) 10 MG capsule Take 1 capsule by mouth 3 times daily as needed (IBS) 90 capsule 3    pravastatin (PRAVACHOL) 10 MG tablet TAKE 1 TABLET BY MOUTH  DAILY 90 tablet 3    chlorthalidone (HYGROTON) 25 MG tablet TAKE 1 TABLET BY MOUTH  DAILY 90 tablet 1    metFORMIN (GLUCOPHAGE-XR) 500 MG extended release tablet TAKE 1 TABLET BY MOUTH TWO  TIMES DAILY 180 tablet 1    [employer] Lancets Ultra Thin MISC 1 each by Does not apply route daily 100 each 3    blood glucose monitor strips Test 1 times a day 100 strip 3    cetirizine-psuedoephedrine (ZYRTEC-D) 5-120 MG per extended release tablet Take 1 tablet by mouth 2 times daily 60 tablet 5    albuterol sulfate HFA (PROAIR HFA) 108 (90 Base) MCG/ACT inhaler Inhale 1-2 puffs into the lungs every 4 hours as needed for Wheezing or Shortness of Breath 6 Inhaler 0    albuterol (ACCUNEB) 1.25 MG/3ML nebulizer solution Inhale 3 mLs into the lungs every 4 hours as needed for Wheezing or Shortness of Breath DX: J45.20 25 vial 1    dicyclomine (BENTYL) 10 MG capsule Take 1-2 capsules by mouth 3 times daily (before meals) 180 capsule 5    Acetaminophen (TYLENOL PO) Take  by mouth.  fluticasone (FLONASE) 50 MCG/ACT nasal spray 1-2 sprays by Nasal route nightly. 3 Bottle 1     No current facility-administered medications for this visit. Allergies   Allergen Reactions    Flagyl [Metronidazole]      hives    Daypro [Oxaprozin]     Sulfa Antibiotics     Tramadol      drowsy    Amlodipine      constipation    Lisinopril      Cough         REVIEW OF SYSTEMS  Review of Systems   Constitutional: Negative for activity change, appetite change, chills and fever. HENT: Negative for congestion, rhinorrhea and sore throat. Eyes: Negative for visual disturbance. Respiratory: Negative for cough, chest tightness, shortness of breath and wheezing. Cardiovascular: Negative for chest pain and leg swelling. Gastrointestinal: Positive for diarrhea. Negative for abdominal distention, abdominal pain, nausea and vomiting.    Genitourinary: Negative for dysuria, frequency, hematuria and urgency. Musculoskeletal: Negative for gait problem and myalgias. Skin: Negative for rash. Neurological: Negative for dizziness, weakness, light-headedness, numbness and headaches. Psychiatric/Behavioral: Negative for sleep disturbance. PHYSICAL EXAM  BP (!) 145/76   Pulse 68   Temp 98.1 °F (36.7 °C) (Oral)   Wt 152 lb 6 oz (69.1 kg)   SpO2 99%   BMI 26.99 kg/m²   Physical Exam  Vitals signs reviewed. Constitutional:       General: She is not in acute distress. Appearance: Normal appearance. She is well-developed. She is not diaphoretic. HENT:      Head: Normocephalic and atraumatic. Right Ear: Tympanic membrane normal.      Left Ear: Tympanic membrane normal.      Nose: Nose normal.      Mouth/Throat:      Mouth: Mucous membranes are moist.      Pharynx: Oropharynx is clear. No oropharyngeal exudate or posterior oropharyngeal erythema. Eyes:      General:         Right eye: No discharge. Left eye: No discharge. Pupils: Pupils are equal, round, and reactive to light. Neck:      Musculoskeletal: Normal range of motion and neck supple. Vascular: No JVD. Cardiovascular:      Rate and Rhythm: Normal rate and regular rhythm. Pulses: Normal pulses. Pulmonary:      Effort: Pulmonary effort is normal. No respiratory distress. Breath sounds: No stridor. No wheezing, rhonchi or rales. Chest:      Chest wall: No tenderness. Abdominal:      General: Bowel sounds are normal. There is no distension. Palpations: Abdomen is soft. Tenderness: There is no abdominal tenderness. There is no guarding. Musculoskeletal: Normal range of motion. General: No swelling or deformity. Skin:     General: Skin is warm and dry. Capillary Refill: Capillary refill takes less than 2 seconds. Coloration: Skin is not pale. Findings: No bruising, lesion or rash. Neurological:      General: No focal deficit present. Mental Status: She is alert and oriented to person, place, and time. Psychiatric:         Mood and Affect: Mood normal.         Behavior: Behavior normal.            ASSESSMENT/PLAN:   1. Irritable bowel syndrome with diarrhea  Patient presents today with complaints of worsening diarrhea associated with irritable bowel syndrome. Patient reports that she has had this for years however it appears that symptoms have increased and episodes are frequently. Patient denies any abdominal bloating but does have occasional cramping. Patient reports that episodes are most like in the morning. Patient unable to identify particular foods that make symptoms worse. No dizziness or lightheadedness. No fever or chills. No dark or tarry stools. Patient denies any noted blood in movements. Patient reports that they are very liquidy and appear oily. Patient reports it is very difficult to plan her day when episodes are occurring. Patient reports that she has been on Bentyl in the past but does not try to take it because of the minimal cramping. Patient has tried some over-the-counter diarrhea medicine. We discussed placement of medication such as Linzess or Amitiza. Patient has had colonoscopy in 2017 which revealed some diverticulosis. We did discuss dietary changes as well as incorporating probiotic. We discussed going on a gluten-free/dairy free diet as well as adding probiotic to see if symptoms would improve. Patient would like to try alternative methods such as diet and probiotics and if they do not work discuss going on medications for IBS. Fecal test ordered patient aware to collect specimen for analysis. Patient will follow-up for any new or worsening symptoms. Previous laboratory studies performed in the past 1-1/2 months were reviewed and normal.  Patient aware that if symptoms were to worsen we would need to repeat laboratory studies.   Patient encouraged to drink plenty of fluids and will be given information on dietary restrictions upon discharge. - FECAL FAT, QUALITATIVE; Future  - PANCREATIC ELASTASE, FECAL; Future   -BLOOD OCCULT      The note was completed using Dragon voice recognition transcription. Every effort was made to ensure accuracy; however, inadvertent  transcription errors may be present despite my best efforts to edit errors.     Manish Monique, APRN - CNP

## 2020-09-14 NOTE — PATIENT INSTRUCTIONS
on 5 or more days of the week. · Take a fiber supplement, such as Citrucel or Metamucil, every day if needed. Read and follow all instructions on the label. · Schedule time each day for a bowel movement. Having a daily routine may help. Take your time and do not strain when having a bowel movement. · Check with your doctor before you increase the amount of fiber in your diet. For some people who have IBS, eating more fiber may make some symptoms worse. This includes bloating. To reduce diarrhea  You may try giving up foods or drinks one at a time to see whether symptoms improve. Limit or avoid the following:  · Alcohol  · Caffeine, which is found in coffee, tea, cola drinks, and chocolate  · Nicotine, from smoking or chewing tobacco  · Gas-producing foods, such as beans, broccoli, cabbage, and apples  · Dairy products that contain lactose (milk sugar), such as ice cream and milk. · Foods and drinks high in sugar, especially fruit juice, soda, candy, and other packaged sweets (such as cookies)  · Foods high in fat, including rothman, sausage, butter, oils, and anything deep-fried  · Sorbitol and xylitol, artificial sweeteners found in some sugarless candies and chewing gum  Keep track of foods  · Some people with IBS use a daily food diary to keep track of what they eat and whether they have any symptoms after eating certain foods. The diary also can be a good way to record what is going on in your life. · Stress plays a role in IBS. So if you are aware that certain stresses bring on symptoms, you can try to reduce those stresses. Keep mealtimes pleasant  · Try to maintain a pleasant environment when you eat. This may reduce stress that can make symptoms likely to occur. · Give yourself plenty of time to eat, rather than eating on the go. Chew your food slowly. Try not to swallow air, which can cause bloating. Where can you learn more? Go to https://chpesruthieb.healthHublished. org and sign in to your MyChart account. Enter U932 in the [facility] box to learn more about \"Diet for Irritable Bowel Syndrome: Care Instructions. \"     If you do not have an account, please click on the \"Sign Up Now\" link. Current as of: August 22, 2019               Content Version: 12.5  © 9339-9491 Healthwise, [employer]. Care instructions adapted under license by [facility] ([facility]). If you have questions about a medical condition or this instruction, always ask your healthcare professional. [address] any warranty or liability for your use of this information. Patient Education        Gluten-Free Diet: Care Instructions  Your Care Instructions     To help your symptoms, your doctor has recommended a gluten-free diet. This means not eating foods that have gluten in them. Gluten is a kind of protein. It's found in wheat, barley, and rye. If you eat a gluten-free diet, you can help manage your symptoms and prevent long-term problems. You can also get all the nutrition you need. Follow-up care is a key part of your treatment and safety. Be sure to make and go to all appointments, and call your doctor if you are having problems. It's also a good idea to know your test results and keep a list of the medicines you take. How can you care for yourself at home? · Don't eat any foods that have gluten in them. These include bagels, bread, crackers, and some cereals. They also include pasta and pizza. · Carefully read food labels. Look for wheat or wheat products in ice cream and candy. You may also find them in salad dressing, canned and frozen soups and vegetables, and other processed foods. · Avoid all beer products unless the label says they are gluten-free. Beers with and without alcohol have gluten unless the labels say they are gluten-free. This includes lagers, ales, and stouts.   · Avoid oats, at least at first. Oats may cause symptoms in some people, perhaps as a result of contamination with wheat, barley, or rye during processing. But many people who have celiac disease can eat moderate amounts of oats without having symptoms. Health professionals vary in their long-term recommendations regarding eating foods with oats. But most agree it is safe to eat oats labeled as gluten-free. · When you eat out, look for restaurants that serve gluten-free food. You can also ask if the  is familiar with gluten-free cooking. · Try to learn more about gluten-free options. Find grocery stores that sell gluten-free pizza and other foods. If you have access to the Internet, look online for gluten-free foods and recipes. · On a gluten-free eating plan, it's okay to have:  ? Eggs and dairy products. (But some dairy products may make your symptoms worse. Ask your doctor if you have questions about dairy products. Read ingredient labels carefully. Some processed cheeses contain gluten.)  ? Flours and foods made with amaranth, arrowroot, beans, buckwheat, corn, cornmeal, flax, millet, potatoes, gluten-free nut and oat bran, quinoa, rice, sorghum, soybeans, tapioca, or teff. ? Fresh, frozen, or canned unprocessed meats. But avoid processed meats. Some examples of processed meats to avoid are hot dogs, salami, and deli meat. Read labels for additives that may contain gluten. ? Fresh, frozen, dried, or canned fruits and vegetables, if they do not have thickeners or other additives that contain gluten. ? Some alcohol drinks. These include wine, liqueurs, and ciders. They also include liquor like whiskey and mitch. When should you call for help? Watch closely for changes in your health, and be sure to contact your doctor if:  · You have unexplained weight loss. · You have diarrhea that lasts longer than 1 to 2 weeks. · You have unusual fatigue or mood changes, especially if these last more than a week and are not related to any other illness, such as the flu. · Your symptoms come back again.   · Your stomach pain gets worse. Where can you learn more? Go to https://chpepiceweb.[employer]. org and sign in to your [employer] account. Enter 31 [phone] in the [facility] box to learn more about \"Gluten-Free Diet: Care Instructions. \"     If you do not have an account, please click on the \"Sign Up Now\" link. Current as of: August 22, 2019               Content Version: 12.5  © 2006-2020 [employer]. Care instructions adapted under license by [facility] ([facility]). If you have questions about a medical condition or this instruction, always ask your healthcare professional. [place] [address] any warranty or liability for your use of this information. Patient Education        Wheat Allergy: Care Instructions  Your Care Instructions     When you have a wheat allergy and you eat wheat, your body reacts as if the wheat is trying to cause harm. It fights back by setting off an allergic reaction. A mild reaction may include a few raised, red, itchy patches of skin (called hives). A severe reaction may cause hives all over, swelling in the throat, trouble breathing, nausea or vomiting, or fainting. This is called anaphylaxis (say \"STU-[id number]-[id number]-LAK-[id number]\"). It can be deadly. Having a wheat allergy is not the same as having celiac disease or eating a gluten-free diet. A good way to prevent an allergic reaction is to avoid the foods that cause it. Besides wheat breads, cereals, and pasta, wheat might be found in processed meats and sauces. An allergy doctor or a dietitian may be able to help you understand which foods might be okay and what to avoid. Learn what to do if you have a reaction. Follow-up care is a key part of your treatment and safety. Be sure to make and go to all appointments, and call your doctor if you are having problems. It's also a good idea to know your test results and keep a list of the medicines you take. How can you care for yourself at home?   During a mild reaction  · Take an over-the-counter antihistamine, such as diphenhydramine (Benadryl) or loratadine (Claritin), as your doctor recommends. If you have a severe reaction, you also might be given one of these antihistamines. During a severe reaction  · Call for emergency help. A serious reaction is an emergency. · Give yourself an epinephrine shot. Make sure it is with you at all times. To prevent future reactions  · Avoid the foods that cause problems. And try not to use utensils or cookware that may have been in contact with food that you are allergic to. · Teach your family members, coworkers, and friends what to do if you have a severe reaction to a food that you are allergic to. · Wear medical alert jewelry that lists your allergies. You can buy this at most [employer]. When should you call for help? Give an epinephrine shot if:  · You think you are having a severe allergic reaction. After you give an epinephrine shot, call 911, even if you feel better. Call 911 anytime you think you may need emergency care. For example, call if:  · You have symptoms of a severe allergic reaction. These may include:  ? Sudden raised, red areas (hives) all over your body. ? Swelling of the throat, mouth, lips, or tongue. ? Trouble breathing. ? Passing out (losing consciousness). Or you may feel very lightheaded or suddenly feel weak, confused, or restless. · You have been given an epinephrine shot, even if you feel better. Call your doctor now or seek immediate medical care if:  · You have symptoms of an allergic reaction, such as:  ? A rash or hives (raised, red areas on the skin). ? Itching. ? Swelling. ? Belly pain, nausea, or vomiting. Watch closely for changes in your health, and be sure to contact your doctor if:  · You do not get better as expected. Where can you learn more? Go to https://IGI LABORATORIESbranden.Xinguodu. org and sign in to your whoactually account.  Enter W220 in the [employer] box to learn more about \"Wheat Allergy: Care Instructions. \"     If you do not have an account, please click on the \"Sign Up Now\" link. Current as of: October 7, 2019               Content Version: 12.5  © 6044-2266 Healthwise, [employer]. Care instructions adapted under license by [facility] ([facility]). If you have questions about a medical condition or this instruction, always ask your healthcare professional. [address] any warranty or liability for your use of this information.

## 2020-10-20 ENCOUNTER — OFFICE VISIT (OUTPATIENT)
Dept: FAMILY MEDICINE CLINIC | Age: 75
End: 2020-10-20
Payer: MEDICARE

## 2020-10-20 VITALS
TEMPERATURE: 98.9 F | BODY MASS INDEX: 27.1 KG/M2 | WEIGHT: 153 LBS | HEART RATE: 65 BPM | DIASTOLIC BLOOD PRESSURE: 78 MMHG | OXYGEN SATURATION: 98 % | SYSTOLIC BLOOD PRESSURE: 135 MMHG

## 2020-10-20 LAB — HBA1C MFR BLD: 6.4 %

## 2020-10-20 PROCEDURE — 99214 OFFICE O/P EST MOD 30 MIN: CPT | Performed by: NURSE PRACTITIONER

## 2020-10-20 PROCEDURE — 83036 HEMOGLOBIN GLYCOSYLATED A1C: CPT | Performed by: NURSE PRACTITIONER

## 2020-10-20 ASSESSMENT — ENCOUNTER SYMPTOMS
ABDOMINAL PAIN: 0
RHINORRHEA: 0
CHEST TIGHTNESS: 0
VOMITING: 0
COUGH: 0
NAUSEA: 0
WHEEZING: 0
SORE THROAT: 1
DIARRHEA: 1
ABDOMINAL DISTENTION: 0
SHORTNESS OF BREATH: 0

## 2020-10-20 NOTE — PROGRESS NOTES
CHIEF COMPLAINT  Chief Complaint   Patient presents with    Check-Up        HPI   Darlin Amaya is a 70 y.o. female who presents to the office. Patient denies any recent changes to medical history or medications. Patient takes medications as prescribed with no missed doses. Patient Aiyana Risk any new unusual fatigue or unexplained weight loss. No chest pain or shortness of breath. No dizziness or lightheadedness. No cough or congestion. No dysuria, hematuria, urgency, frequency, retention. No dark or tarry stools. Patient has chronic ongoing IBS with diarrhea. Patient reports checking blood sugar 2-3 times a week. Patient reports yesterday's reading was 109. Patient reports that she has had some postnasal drainage and sore throat recently as well. No other complaints, modifying factors or associated symptoms. Nursing notes reviewed.    Past Medical History:   Diagnosis Date    Chest pain NEC 7/04    negative GXT/cardiolite    Depression     1997    Hyperlipemia     Routine health maintenance     GYN--TAC(6/07)     Past Surgical History:   Procedure Laterality Date    APPENDECTOMY      COLONOSCOPY  08/03/2017    diverticulosis    DILATION AND CURETTAGE OF UTERUS      EPIDURAL STEROID INJECTION Right 6/24/2019    RIGHT LUMBAR TWO AND RIGHT LUMBAR FIVE SACRAL ONE EPIDURAL STEROID INJECTION SITE CONFIRMED BY FLUOROSCOPY performed by Samina Nuñez MD at [facility] Right 12/3/2019    RIGHT LUMBAR TWO LUMBAR THREE TRANSFORMINAL EPIDURAL STEROID INJECTION AND RIGHT LUMBAR FIVE SACRAL ONE EPIDURAL STEROID INJECTION SITE CONFIRMED BY FLUOROSCOPY performed by Samina Nuñez MD at [place] [address] Right 10/17/2017    ROBOTIC, RIGHT INGUINAL HERNIA REPAIR WITH MESH    ROTATOR CUFF REPAIR  2002, 3/2011    [facility]     Family History   Problem Relation Age of Onset    Diabetes Father  Arthritis Sister      Social History     Socioeconomic History    Marital status:      Spouse name: Not on file    Number of children: Not on file    Years of education: Not on file    Highest education level: Not on file   Occupational History    Not on file   Social Needs    Financial resource strain: Not on file    Food insecurity     Worry: Not on file     Inability: Not on file    Transportation needs     Medical: Not on file     Non-medical: Not on file   Tobacco Use    Smoking status: Never Smoker    Smokeless tobacco: Never Used   Substance and Sexual Activity    Alcohol use: Yes     Comment: social    Drug use: No    Sexual activity: Not on file   Lifestyle    Physical activity     Days per week: Not on file     Minutes per session: Not on file    Stress: Not on file   Relationships    Social connections     Talks on phone: Not on file     Gets together: Not on file     Attends Sikhism service: Not on file     Active member of club or organization: Not on file     Attends meetings of clubs or organizations: Not on file     Relationship status: Not on file    Intimate partner violence     Fear of current or ex partner: Not on file     Emotionally abused: Not on file     Physically abused: Not on file     Forced sexual activity: Not on file   Other Topics Concern    Not on file   Social History Narrative    Not on file     Current Outpatient Medications   Medication Sig Dispense Refill    SYMBICORT 160-4.5 MCG/ACT AERO USE 2 PUFFS BY MOUTH TWO  TIMES DAILY 30.6 g 1    meloxicam (MOBIC) 15 MG tablet TAKE 1 TABLET BY MOUTH  DAILY 90 tablet 1    nortriptyline (PAMELOR) 10 MG capsule Take 1 capsule by mouth 3 times daily as needed (IBS) 90 capsule 3    pravastatin (PRAVACHOL) 10 MG tablet TAKE 1 TABLET BY MOUTH  DAILY 90 tablet 3    chlorthalidone (HYGROTON) 25 MG tablet TAKE 1 TABLET BY MOUTH  DAILY 90 tablet 1    metFORMIN (GLUCOPHAGE-XR) 500 MG extended release tablet TAKE 1 TABLET BY MOUTH TWO  TIMES DAILY 180 tablet 1    [employer] Lancets Ultra Thin MISC 1 each by Does not apply route daily 100 each 3    blood glucose monitor strips Test 1 times a day 100 strip 3    dicyclomine (BENTYL) 10 MG capsule Take 1-2 capsules by mouth 3 times daily (before meals) 180 capsule 5    Acetaminophen (TYLENOL PO) Take  by mouth.  fluticasone (FLONASE) 50 MCG/ACT nasal spray 1-2 sprays by Nasal route nightly. 3 Bottle 1    cetirizine-psuedoephedrine (ZYRTEC-D) 5-120 MG per extended release tablet Take 1 tablet by mouth 2 times daily (Patient not taking: Reported on 10/20/2020) 60 tablet 5    albuterol sulfate HFA (PROAIR HFA) 108 (90 Base) MCG/ACT inhaler Inhale 1-2 puffs into the lungs every 4 hours as needed for Wheezing or Shortness of Breath (Patient not taking: Reported on 10/20/2020) 6 Inhaler 0    albuterol (ACCUNEB) 1.25 MG/3ML nebulizer solution Inhale 3 mLs into the lungs every 4 hours as needed for Wheezing or Shortness of Breath DX: J45.20 (Patient not taking: Reported on 10/20/2020) 25 vial 1     No current facility-administered medications for this visit. Allergies   Allergen Reactions    Flagyl [Metronidazole]      hives    Daypro [Oxaprozin]     Sulfa Antibiotics     Tramadol      drowsy    Amlodipine      constipation    Lisinopril      Cough         REVIEW OF SYSTEMS  Review of Systems   Constitutional: Negative for activity change, appetite change, chills and fever. HENT: Positive for postnasal drip and sore throat. Negative for congestion and rhinorrhea. Eyes: Negative for visual disturbance. Respiratory: Negative for cough, chest tightness, shortness of breath and wheezing. Cardiovascular: Negative for chest pain and leg swelling. Gastrointestinal: Positive for diarrhea. Negative for abdominal distention, abdominal pain, nausea and vomiting. Genitourinary: Negative for dysuria, frequency, hematuria and urgency.    Musculoskeletal: Negative for gait person, place, and time. Psychiatric:         Mood and Affect: Mood normal.         Behavior: Behavior normal.            ASSESSMENT/PLAN:   1. Gastroesophageal reflux disease without esophagitis  Stable. Asymptomatic. No breakthrough dysphagia, nausea, excessive belching or indigestion. Continue current treatment management follow-up in 6 months, sooner for new or worsening symptoms. 2. Irritable bowel syndrome with diarrhea  Ongoing versus uncontrolled. Patient was seen approximately 1 month ago for IBS with diarrhea. Orders were placed for stool studies however patient reports that she has not done them yet. Monitoring gluten and dairy intake were discussed as well however patient reports that she has not been as compliant. Patient reports that she was taking probiotic daily but noticed she was having constipation so she has slowed down on taking it. I did recommend taking it every other day or eating a yogurt. Patient denies any abdominal pain, bloating, cramping or distention. Patient reports that symptoms vary day-to-day. Patient reports multiple episodes when present. No dark tarry stools. Patient seen gastroenterology in the past last colonoscopy 2017. We discussed today once again medications that may help with IBS as well as dietary modifications and increasing fiber. Patient does have Bentyl but does not take it on a regular basis. Patient is currently on Metformin for diabetes mellitus. Patient reports that she has been on that for quite some time. We did discuss the possibility of diarrhea associated with medication. Patient reports that she currently takes metformin 500 mg x 2 tablets at night. I did recommend taking 1 in the morning and 1 at night to see if symptoms are more tolerable. Patient will follow-up for any new or worsening symptoms. Continue to monitor    3.  Controlled type 2 diabetes mellitus without complication, without long-term current use of insulin ([facility].)  Controlled. A1c today is 6.4. Patient checks blood sugar approximately 2-3 times a week. Yesterday reading was 109. No episodes of hyperglycemia or hypoglycemia. Patient currently takes metformin  mg x 2 tablets at night. Patient is having ongoing episodes of IBS with diarrhea therefore I did recommend spacing out tablets to see if symptoms would improve as diarrhea can be adverse side effect of Metformin. Patient verbalized and acknowledges. Continue monitoring blood sugars and dietary restrictions. Patient reports having diabetic eye exam performed in August.  Follow-up in 4 months, sooner for new or worsening symptoms.  - POCT glycosylated hemoglobin (Hb A1C)    4. Primary osteoarthritis involving multiple joints  Stable. Patient takes Tylenol and meloxicam.  No adverse side effects or missed doses. Continue current treatment management follow-up in 4 to 6 months, sooner for new or worsening symptoms. 5. Allergic sinusitis  Stable. Patient reports that she has noticed some increase in postnasal drainage as well as throat irritation the past few days. Patient is on Singulair daily. Patient reports that she takes it for asthma control. Patient denies any cough, congestion, excessive rhinorrhea, otalgia, wheezing or shortness of breath. Patient reports that she has been on Singulair for a long time therefore I did recommend switching to loratadine to see if symptoms would improve. Continue with current treatment and management. Patient encouraged to drink plenty of fluids to stay hydrated and take Tylenol for pain. Follow-up 4 to 6 months, sooner for new or worsening symptoms. 6. Benign essential hypertension  Controlled. Patient compliant with chlorthalidone 25 mg daily. No adverse side effects or missed doses. No episodes of chest pain, tightness, palpitations or shortness of breath.   Continue current treatment management follow-up in 4 months, sooner for new or worsening symptoms. 7. Pure hypercholesterolemia  Stable. Patient compliant with pravastatin 10 mg daily. No adverse side effects or missed doses. Recommendations to monitor dietary intake such as limiting saturated fats. Follow-up in 4 months, sooner for new or worsening symptoms. 8. Vitamin D deficiency  Stable. No current supplement at this time. Recommendations for vitamin D supplement due to osteopenia. Continue with current management follow-up for any new or worsening symptoms. 9. Dyshidrotic eczema  Stable. Patient reports eczema outbreaks to hands. Patient reports that she did buy some CeraVe lotion and used it yesterday which seems to have helped. I did recommend using Aquaphor type ointment at night and applying gloves afterwards and using CeraVe lotion throughout the day. Patient verbalized acknowledges continue with current treatment management follow-up for any new or worsening symptoms. The note was completed using Dragon voice recognition transcription. Every effort was made to ensure accuracy; however, inadvertent  transcription errors may be present despite my best efforts to edit errors.     Robb Rg, APRN - CNP

## 2020-10-26 RX ORDER — CHLORTHALIDONE 25 MG/1
TABLET ORAL
Qty: 90 TABLET | Refills: 1 | Status: SHIPPED | OUTPATIENT
Start: 2020-10-26 | End: 2021-07-30

## 2020-10-26 RX ORDER — METFORMIN HYDROCHLORIDE 500 MG/1
TABLET, EXTENDED RELEASE ORAL
Qty: 180 TABLET | Refills: 1 | Status: SHIPPED | OUTPATIENT
Start: 2020-10-26 | End: 2021-07-30

## 2020-10-27 ENCOUNTER — TELEPHONE (OUTPATIENT)
Dept: FAMILY MEDICINE CLINIC | Age: 75
End: 2020-10-27

## 2020-12-07 ENCOUNTER — VIRTUAL VISIT (OUTPATIENT)
Dept: FAMILY MEDICINE CLINIC | Age: 75
End: 2020-12-07
Payer: MEDICARE

## 2020-12-07 PROCEDURE — 99213 OFFICE O/P EST LOW 20 MIN: CPT | Performed by: NURSE PRACTITIONER

## 2020-12-07 RX ORDER — BUDESONIDE AND FORMOTEROL FUMARATE DIHYDRATE 160; 4.5 UG/1; UG/1
AEROSOL RESPIRATORY (INHALATION)
Qty: 30.6 G | Refills: 3 | Status: SHIPPED | OUTPATIENT
Start: 2020-12-07 | End: 2021-12-20

## 2020-12-07 RX ORDER — HYDROXYZINE PAMOATE 25 MG/1
25 CAPSULE ORAL 2 TIMES DAILY PRN
Qty: 60 CAPSULE | Refills: 1 | Status: SHIPPED | OUTPATIENT
Start: 2020-12-07 | End: 2021-02-05

## 2020-12-07 ASSESSMENT — ENCOUNTER SYMPTOMS
WHEEZING: 0
VOMITING: 0
NAUSEA: 0
ABDOMINAL PAIN: 0
SHORTNESS OF BREATH: 0
SORE THROAT: 0
RHINORRHEA: 0
DIARRHEA: 0
COUGH: 0

## 2020-12-07 NOTE — PROGRESS NOTES
self-injury and suicidal ideas. The patient is nervous/anxious. Prior to Visit Medications    Medication Sig Taking? Authorizing Provider   SYMBICORT 160-4.5 MCG/ACT AERO USE 2 INHALATIONS BY MOUTH  TWICE DAILY Yes GIULIANO Boyce CNP   hydrOXYzine (VISTARIL) 25 MG capsule Take 1 capsule by mouth 2 times daily as needed for Anxiety Yes GIULIANO Boyce CNP   chlorthalidone (HYGROTON) 25 MG tablet TAKE 1 TABLET BY MOUTH  DAILY Yes GIULIANO Boyce CNP   metFORMIN (GLUCOPHAGE-XR) 500 MG extended release tablet TAKE 1 TABLET BY MOUTH  TWICE DAILY Yes GIULIANO Boyce CNP   meloxicam (MOBIC) 15 MG tablet TAKE 1 TABLET BY MOUTH  DAILY Yes GIULIANO Boyce CNP   pravastatin (PRAVACHOL) 10 MG tablet TAKE 1 TABLET BY MOUTH  DAILY Yes Mike Ragsdale MD   [employer] Lancets Ultra Thin MISC 1 each by Does not apply route daily Yes Mike Ragsdale MD   blood glucose monitor strips Test 1 times a day Yes Mike Ragsdale MD   albuterol sulfate HFA (PROAIR HFA) 108 (90 Base) MCG/ACT inhaler Inhale 1-2 puffs into the lungs every 4 hours as needed for Wheezing or Shortness of Breath Yes Mike Ragsdale MD   albuterol (ACCUNEB) 1.25 MG/3ML nebulizer solution Inhale 3 mLs into the lungs every 4 hours as needed for Wheezing or Shortness of Breath DX: J45.20 Yes Mike Ragsdale MD   Acetaminophen (TYLENOL PO) Take  by mouth. Yes Historical Provider, MD   fluticasone (FLONASE) 50 MCG/ACT nasal spray 1-2 sprays by Nasal route nightly.   Mike Ragsdale MD       Social History     Tobacco Use    Smoking status: Never Smoker    Smokeless tobacco: Never Used   Substance Use Topics    Alcohol use: Yes     Comment: social    Drug use: No        Allergies   Allergen Reactions    Flagyl [Metronidazole]      hives    Daypro [Oxaprozin]     Sulfa Antibiotics     Tramadol      drowsy    Amlodipine      constipation    Lisinopril      Cough     ,   Past Medical History:   Diagnosis Date    Chest pain NEC 7/04    negative Diabetic retinal exam  07/18/2020    Diabetic foot exam  06/17/2021    Diabetic microalbuminuria test  06/17/2021    Lipid screen  06/17/2021    Potassium monitoring  06/17/2021    Creatinine monitoring  06/17/2021    DEXA (modify frequency per FRAX score)  08/20/2021    A1C test (Diabetic or Prediabetic)  10/20/2021    DTaP/Tdap/Td vaccine (2 - Td) 12/12/2021    Breast cancer screen  08/04/2022    Colon cancer screen colonoscopy  08/03/2027    Flu vaccine  Completed    Pneumococcal 65+ years Vaccine  Completed    Hepatitis C screen  Addressed    Hepatitis A vaccine  Aged Out    Hib vaccine  Aged Out    Meningococcal (ACWY) vaccine  Aged Out       PHYSICAL EXAMINATION:  [ INSTRUCTIONS:  \"[x]\" Indicates a positive item  \"[]\" Indicates a negative item  -- DELETE ALL ITEMS NOT EXAMINED]  Vital Signs: (As obtained by patient/caregiver or practitioner observation)    Due to this being a telehealth encounter, evaluation of the following organ systems/vital signs are limited. Constitutional: [x] Appears well-developed and well-nourished [x] No apparent distress      [] Abnormal-   Mental status  [x] Alert and awake  [x] Oriented to person/place/time [x]Able to follow commands      Eyes:  EOM    [x]  Normal  [] Abnormal-  Sclera  [x]  Normal  [] Abnormal -         Discharge [x]  None visible  [] Abnormal -    HENT:   [x] Normocephalic, atraumatic.   [] Abnormal   [x] Mouth/Throat: Mucous membranes are moist.     External Ears [x] Normal  [] Abnormal-     Neck: [x] No visualized mass     Pulmonary/Chest: [x] Respiratory effort normal.  [x] No visualized signs of difficulty breathing or respiratory distress        [] Abnormal-      Musculoskeletal:   [x] Normal gait with no signs of ataxia         [x] Normal range of motion of neck        [] Abnormal-       Neurological:        [x] No Facial Asymmetry (Cranial nerve 7 motor function) (limited exam to video visit)          [x] No gaze palsy        [] Abnormal- Skin:        [x] No significant exanthematous lesions or discoloration noted on facial skin         [] Abnormal-            Psychiatric:       [x] Normal Affect [x] No Hallucinations        [] Abnormal-     Other pertinent observable physical exam findings-     ASSESSMENT/PLAN:  1. Anxiety  Uncontrolled. Patient presents today with worsening anxiety. Patient reports that she has had symptoms of anxiety in the past and was placed on Prozac years ago. Patient reports that initial dose did not help so medication was increased and patient experienced adverse side effect at that time. Patient reports that over the past few months she has noticed increased anxiety. Patient reports feeling very impatient at times. Patient reports that she is also noticed the anxiety causing difficulty sleeping at night. Patient reports that she will take melatonin and is able to fall asleep however if she does wake up she is unable to fall back asleep. Patient reports that she feels as though she is worrying about everything too much and with COVID-19 things making it difficult to cope and handle stress. No suicidal homicidal ideations or thoughts. Patient reports that her sister is ill and unable to see her which makes her anxiety worse. We did discuss taking medications on a daily basis for anxiety versus taking medication as needed for anxiety. Patient reports that she would like to start with something to take only as needed due to symptoms not on a daily basis. Adverse side effects of the medications discussed with patient. Patient verbalized acknowledges. I did recommend other coping mechanisms to do with medications. Patient aware that if she is to start noticing she is taking the medications on a daily basis I would recommend her starting on a daily regimen of different medicine. Patient verbalized and acknowledges. Patient will follow-up for any new or worsening symptoms.   - hydrOXYzine (VISTARIL) 25 MG

## 2020-12-10 RX ORDER — MONTELUKAST SODIUM 10 MG/1
TABLET ORAL
Qty: 90 TABLET | Refills: 3 | Status: SHIPPED | OUTPATIENT
Start: 2020-12-10 | End: 2022-01-27 | Stop reason: SDUPTHER

## 2020-12-10 RX ORDER — MELOXICAM 15 MG/1
TABLET ORAL
Qty: 90 TABLET | Refills: 3 | Status: SHIPPED | OUTPATIENT
Start: 2020-12-10 | End: 2022-02-21

## 2021-04-01 ENCOUNTER — TELEPHONE (OUTPATIENT)
Dept: FAMILY MEDICINE CLINIC | Age: 76
End: 2021-04-01

## 2021-04-01 NOTE — TELEPHONE ENCOUNTER
----- Message from Salvador Conrad sent at 4/1/2021  2:41 PM EDT -----  Subject: Referral Request    QUESTIONS   Reason for referral request? Patient is needing a cardiologist was in   hospital in [place] for chest pain wasn't heart attack but they want her   to be seen by cardiologist and told her to call PCP. Has the physician seen you for this condition before? No   Preferred Specialist (if applicable)? Do you already have an appointment scheduled? Additional Information for Provider?   ---------------------------------------------------------------------------  --------------  CALL BACK INFO  What is the best way for the office to contact you?  OK to leave message on   voicemail  Preferred Call Back Phone Number? [phone]

## 2021-04-01 NOTE — TELEPHONE ENCOUNTER
Patient was in the hospital in [place] for chest pain and was advised to follow up with a cardiologist when she returned. Appt was made on 4-8-21 with Dr. Art Fang.

## 2021-04-07 NOTE — PROGRESS NOTES
[address]   Cardiac Consultation    Referring Provider:  Noa Mireles, GIULIANO - CNP     Chief Complaint   Patient presents with    New Patient    Chest Pain     Had been riding her bike about 5 minutes, became tingly, them 'tightness\". History of Present Illness:   Param Day is a 70 y.o. female who is here today as a NEW patient for cardiology for hospital follow up for chest pain. She has a past medical history of diabetes mellitus, asthma, hypertension, and hyperlipidemia. She presented to [facility] in [place] on 3/31/2021 with complaints of chest pain. She was admitted for further work up to rule out ACS. Chest Xray and Troponin's negative. Carotid dopplers with 50-69% stenosis in right and left carotid artery. Started on statin therapy. EKG showed sinus bradycardia. An echocardiogram on 4/1/2021 showed an EF of 60% with mild mitral and tricuspid regurgitation. Today she states while in [place] and she went to check the mail on her bike when she started to feel SOB, chest pain, and body tingling. She states the pain felt like a squeezing in her chest and spread to neck and arms. Squad was called and she states her blood pressure was elevated- 180-190's. episode lasted for around 2 hours. Nothing made worse. No recurrence. Resolved spontaneously. She states the only thing that was new for her was she had taken one dose of CBD oil. She states she has had chest pain in the past which started years ago related to walking. She contributed this pain to her asthma. She states she has palpitations at times. Patient currently denies any weight gain, edema, dizziness, and syncope. Past Medical History:   has a past medical history of Chest pain NEC, Depression, Hyperlipemia, and Routine health maintenance. Surgical History:   has a past surgical history that includes Rotator cuff repair (2002, 3/2011); Appendectomy; Dilation and curettage of uterus;  Hemorrhoid surgery; pipo and bso Amlodipine, and Lisinopril     Review of Systems:   · Constitutional: there has been no unanticipated weight loss. There's been no change in energy level, sleep pattern, or activity level. · Eyes: No visual changes or diplopia. No scleral icterus. · ENT: No Headaches, hearing loss or vertigo. No mouth sores or sore throat. · Cardiovascular: Reviewed in HPI  · Respiratory: No cough or wheezing, no sputum production. No hematemesis. · Gastrointestinal: No abdominal pain, appetite loss, blood in stools. No change in bowel or bladder habits. · Genitourinary: No dysuria, trouble voiding, or hematuria. · Musculoskeletal:  No gait disturbance, weakness or joint complaints. · Integumentary: No rash or pruritis. · Neurological: No headache, diplopia, change in muscle strength, numbness or tingling. No change in gait, balance, coordination, mood, affect, memory, mentation, behavior. · Psychiatric: No anxiety, no depression. · Endocrine: No malaise, fatigue or temperature intolerance. No excessive thirst, fluid intake, or urination. No tremor. · Hematologic/Lymphatic: No abnormal bruising or bleeding, blood clots or swollen lymph nodes. · Allergic/Immunologic: No nasal congestion or hives. Physical Examination:    Vitals:    04/08/21 0815   BP: (!) 160/70   Pulse: 72   Temp: 98.6 °F (37 °C)   SpO2: 95%        Constitutional and General Appearance: NAD   Respiratory:  · Normal excursion and expansion without use of accessory muscles  · Resp Auscultation: Normal breath sounds without dullness  Cardiovascular:  · The apical impulses not displaced  · Heart tones are crisp and normal  · Cervical veins are not engorged  · The carotid upstroke is normal in amplitude and contour without delay or bruit  · Normal S1S2, No S3, No Murmur  · Peripheral pulses are symmetrical and full  · There is no clubbing, cyanosis of the extremities.   · No edema  · Femoral Arteries: 2+ and equal  · Pedal Pulses: 2+ and equal Abdomen:  · No masses or tenderness  · Liver/Spleen: No Abnormalities Noted  Neurological/Psychiatric:  · Alert and oriented in all spheres  · Moves all extremities well  · Exhibits normal gait balance and coordination  · No abnormalities of mood, affect, memory, mentation, or behavior are noted    Stress test 2018  Summary  There is normal isotope uptake at stress and rest. There is no evidence of  myocardial ischemia or scar. Hyperdynamic LV systolic function with LQ>96%  with uniform wall motion. Low risk study. EKG 4/1/2021      Echocardiogram 4/1/2021 [facility]       Carotid dopplers 4/1/2021 [facility]           Assessment:   Chest pain - typical/atypical features  SOB - possible angina equivalent   Palpitations   Asthma   Hypertension - suboptimal   Hyperlipidemia - suboptimal   Diabetes   Asthma   Both sisters with a history of atrial fib   Carotid stenosis     Plan:  Stress test- GXT Myoview   Repeat carotid dopplers in 1 year   Recommend starting an Aspirin 81 mg daily   Change pravastatin to Lipitor 20 mg daily   Start valsartan 160 mg daily for better blood pressure control   1 week CAM monitor to be mailed   Continue other medications   Check blood pressure at home weekly  Regular exercise and following a healthy diet encouraged   Follow up with me in 6 months     The scribes documentation has been prepared under my direction and personally reviewed by me in its entirety. I confirm that the note above accurately reflects all work, treatment, procedures, and medical decision making performed by me. Dr. Best Martinez MD      Scribe's attestation: This note was scribed in the presence of Dr. Best Martinez M.D. By Andres Worthy RN      Thank you for allowing me to participate in the care of this individual.      Lamberto Cyr M.D., Katie Case

## 2021-04-08 ENCOUNTER — OFFICE VISIT (OUTPATIENT)
Dept: CARDIOLOGY CLINIC | Age: 76
End: 2021-04-08
Payer: MEDICARE

## 2021-04-08 ENCOUNTER — TELEPHONE (OUTPATIENT)
Dept: CARDIOLOGY CLINIC | Age: 76
End: 2021-04-08

## 2021-04-08 VITALS
HEART RATE: 72 BPM | OXYGEN SATURATION: 95 % | HEIGHT: 63 IN | WEIGHT: 154 LBS | BODY MASS INDEX: 27.29 KG/M2 | DIASTOLIC BLOOD PRESSURE: 70 MMHG | SYSTOLIC BLOOD PRESSURE: 160 MMHG | TEMPERATURE: 98.6 F

## 2021-04-08 DIAGNOSIS — I65.23 BILATERAL CAROTID ARTERY STENOSIS: Primary | ICD-10-CM

## 2021-04-08 DIAGNOSIS — R07.2 PRECORDIAL PAIN: ICD-10-CM

## 2021-04-08 DIAGNOSIS — R00.2 PALPITATIONS: ICD-10-CM

## 2021-04-08 PROCEDURE — 1036F TOBACCO NON-USER: CPT | Performed by: INTERNAL MEDICINE

## 2021-04-08 PROCEDURE — 3017F COLORECTAL CA SCREEN DOC REV: CPT | Performed by: INTERNAL MEDICINE

## 2021-04-08 PROCEDURE — 99204 OFFICE O/P NEW MOD 45 MIN: CPT | Performed by: INTERNAL MEDICINE

## 2021-04-08 PROCEDURE — G8399 PT W/DXA RESULTS DOCUMENT: HCPCS | Performed by: INTERNAL MEDICINE

## 2021-04-08 PROCEDURE — 1090F PRES/ABSN URINE INCON ASSESS: CPT | Performed by: INTERNAL MEDICINE

## 2021-04-08 PROCEDURE — G8427 DOCREV CUR MEDS BY ELIG CLIN: HCPCS | Performed by: INTERNAL MEDICINE

## 2021-04-08 PROCEDURE — G8417 CALC BMI ABV UP PARAM F/U: HCPCS | Performed by: INTERNAL MEDICINE

## 2021-04-08 PROCEDURE — 1123F ACP DISCUSS/DSCN MKR DOCD: CPT | Performed by: INTERNAL MEDICINE

## 2021-04-08 PROCEDURE — 4040F PNEUMOC VAC/ADMIN/RCVD: CPT | Performed by: INTERNAL MEDICINE

## 2021-04-08 RX ORDER — ATORVASTATIN CALCIUM 40 MG/1
20 TABLET, FILM COATED ORAL DAILY
Qty: 30 TABLET | Refills: 5 | Status: SHIPPED | OUTPATIENT
Start: 2021-04-08 | End: 2021-06-14 | Stop reason: SDUPTHER

## 2021-04-08 RX ORDER — VALSARTAN 160 MG/1
160 TABLET ORAL DAILY
Qty: 30 TABLET | Refills: 5 | Status: SHIPPED | OUTPATIENT
Start: 2021-04-08 | End: 2021-06-14 | Stop reason: SDUPTHER

## 2021-04-12 ENCOUNTER — HOSPITAL ENCOUNTER (OUTPATIENT)
Dept: NON INVASIVE DIAGNOSTICS | Age: 76
Discharge: HOME | End: 2021-04-12
Payer: MEDICARE

## 2021-04-12 ENCOUNTER — HOSPITAL ENCOUNTER (OUTPATIENT)
Dept: NUCLEAR MEDICINE | Age: 76
Discharge: HOME | End: 2021-04-12
Payer: MEDICARE

## 2021-04-12 DIAGNOSIS — R07.2 PRECORDIAL PAIN: ICD-10-CM

## 2021-04-12 LAB
LV EF: 65 %
LVEF MODALITY: NORMAL

## 2021-04-12 PROCEDURE — A9502 TC99M TETROFOSMIN: HCPCS | Performed by: INTERNAL MEDICINE

## 2021-04-12 PROCEDURE — 3430000000 HC RX DIAGNOSTIC RADIOPHARMACEUTICAL: Performed by: INTERNAL MEDICINE

## 2021-04-12 PROCEDURE — 93017 CV STRESS TEST TRACING ONLY: CPT

## 2021-04-12 PROCEDURE — 78452 HT MUSCLE IMAGE SPECT MULT: CPT

## 2021-04-12 RX ADMIN — TETROFOSMIN 10.9 MILLICURIE: 1.38 INJECTION, POWDER, LYOPHILIZED, FOR SOLUTION INTRAVENOUS at 07:15

## 2021-04-12 RX ADMIN — TETROFOSMIN 32.5 MILLICURIE: 1.38 INJECTION, POWDER, LYOPHILIZED, FOR SOLUTION INTRAVENOUS at 08:45

## 2021-04-13 ENCOUNTER — TELEPHONE (OUTPATIENT)
Dept: CARDIOLOGY CLINIC | Age: 76
End: 2021-04-13

## 2021-04-13 NOTE — TELEPHONE ENCOUNTER
Per HIPAA left detailed message on patients voicemail relaying message from Dr. Saad Madden. Advised patient to call with any questions.

## 2021-04-26 PROCEDURE — 93272 ECG/REVIEW INTERPRET ONLY: CPT | Performed by: INTERNAL MEDICINE

## 2021-04-28 DIAGNOSIS — R00.2 PALPITATIONS: ICD-10-CM

## 2021-04-28 DIAGNOSIS — R07.2 PRECORDIAL PAIN: ICD-10-CM

## 2021-04-29 ENCOUNTER — TELEPHONE (OUTPATIENT)
Dept: CARDIOLOGY CLINIC | Age: 76
End: 2021-04-29

## 2021-05-20 ENCOUNTER — TELEPHONE (OUTPATIENT)
Dept: FAMILY MEDICINE CLINIC | Age: 76
End: 2021-05-20

## 2021-05-20 DIAGNOSIS — J01.90 ACUTE SINUSITIS, RECURRENCE NOT SPECIFIED, UNSPECIFIED LOCATION: Primary | ICD-10-CM

## 2021-05-20 RX ORDER — AMOXICILLIN AND CLAVULANATE POTASSIUM 875; 125 MG/1; MG/1
1 TABLET, FILM COATED ORAL 2 TIMES DAILY
Qty: 20 TABLET | Refills: 0 | Status: SHIPPED | OUTPATIENT
Start: 2021-05-20 | End: 2021-05-30

## 2021-06-10 ENCOUNTER — TELEPHONE (OUTPATIENT)
Dept: FAMILY MEDICINE CLINIC | Age: 76
End: 2021-06-10

## 2021-06-10 NOTE — TELEPHONE ENCOUNTER
Patient needs a medical massage order dated for 4-29-21.  Send to [facility] Phone number is [phone] Patient's last OV was 12-7-20 Virtual

## 2021-06-10 NOTE — TELEPHONE ENCOUNTER
Can we obtain more information regarding why patient is receiving them since I have not seen her for this in the past and being that it is postdated. Have facility send over some documentation regarding reasoning for order.

## 2021-06-10 NOTE — TELEPHONE ENCOUNTER
----- Message from Jose Antonio Aristidesaniceto sent at 6/10/2021 10:29 AM EDT -----  Subject: Message to Provider    QUESTIONS  Information for Provider? Patient is need order for medical massage from   [facility] under body shop in [facility] on [address]. Please have post   dated to April 29th. Phone number for place is [phone]  ---------------------------------------------------------------------------  --------------  CALL BACK INFO  What is the best way for the office to contact you? OK to leave message on   voicemail  Preferred Call Back Phone Number? [phone]  ---------------------------------------------------------------------------  --------------  SCRIPT ANSWERS  Relationship to Patient?  Self

## 2021-06-15 RX ORDER — VALSARTAN 160 MG/1
160 TABLET ORAL DAILY
Qty: 90 TABLET | Refills: 1 | Status: SHIPPED | OUTPATIENT
Start: 2021-06-15 | End: 2021-11-30

## 2021-06-15 RX ORDER — ATORVASTATIN CALCIUM 40 MG/1
20 TABLET, FILM COATED ORAL DAILY
Qty: 45 TABLET | Refills: 1 | Status: SHIPPED | OUTPATIENT
Start: 2021-06-15 | End: 2021-09-30

## 2021-07-30 RX ORDER — METFORMIN HYDROCHLORIDE 500 MG/1
TABLET, EXTENDED RELEASE ORAL
Qty: 180 TABLET | Refills: 1 | Status: SHIPPED | OUTPATIENT
Start: 2021-07-30 | End: 2022-01-05

## 2021-07-30 RX ORDER — CHLORTHALIDONE 25 MG/1
TABLET ORAL
Qty: 90 TABLET | Refills: 1 | Status: SHIPPED | OUTPATIENT
Start: 2021-07-30 | End: 2022-01-05

## 2021-08-02 NOTE — PROGRESS NOTES
CHIEF COMPLAINT  Chief Complaint   Patient presents with    Check-Up     IBS        HPI   Jaciel Dean is a 78 y.o. female who presents to the office. Patient denies any new unusual fatigue or unexplained weight loss. No chest pain or chest tightness. No shortness of breath. No abdominal pain or discomfort. Patient reports chronic diarrhea. No nausea or vomiting. No dysuria, hematuria, urine urgency, frequency or retention. Patient reports left lower back pain with sciatica. Patient reports seeing  and receiving injections. Patient reports taking medications as prescribed. No other complaints, modifying factors or associated symptoms. Nursing notes reviewed.    Past Medical History:   Diagnosis Date    Chest pain NEC 7/04    negative GXT/cardiolite    Depression     1997    Hyperlipemia     Routine health maintenance     GYN--TAC(6/07)     Past Surgical History:   Procedure Laterality Date    APPENDECTOMY      COLONOSCOPY  08/03/2017    diverticulosis    DILATION AND CURETTAGE OF UTERUS      EPIDURAL STEROID INJECTION Right 6/24/2019    RIGHT LUMBAR TWO AND RIGHT LUMBAR FIVE SACRAL ONE EPIDURAL STEROID INJECTION SITE CONFIRMED BY FLUOROSCOPY performed by Jodi Claire MD at [facility] Right 12/3/2019    RIGHT LUMBAR TWO LUMBAR THREE TRANSFORMINAL EPIDURAL STEROID INJECTION AND RIGHT LUMBAR FIVE SACRAL ONE EPIDURAL STEROID INJECTION SITE CONFIRMED BY FLUOROSCOPY performed by Jodi Claire MD at [place] [address] Right 10/17/2017    ROBOTIC, RIGHT INGUINAL HERNIA REPAIR WITH MESH    ROTATOR CUFF REPAIR  2002, 3/2011    [facility]     Family History   Problem Relation Age of Onset    Diabetes Father     Arthritis Sister      Social History     Socioeconomic History    Marital status:      Spouse name: Not on file    Number of children: Not on file  Years of education: Not on file    Highest education level: Not on file   Occupational History    Not on file   Tobacco Use    Smoking status: Never Smoker    Smokeless tobacco: Never Used   Substance and Sexual Activity    Alcohol use: Yes     Comment: social    Drug use: No    Sexual activity: Not on file   Other Topics Concern    Not on file   Social History Narrative    Not on file     Social Determinants of Health     Financial Resource Strain:     Difficulty of Paying Living Expenses:    Food Insecurity:     Worried About Running Out of Food in the Last Year:     [address] in the Last Year:    Transportation Needs:     Lack of Transportation (Medical):  Lack of Transportation (Non-Medical):    Physical Activity:     Days of Exercise per Week:     Minutes of Exercise per Session:    Stress:     Feeling of Stress :    Social Connections:     Frequency of Communication with Friends and Family:     Frequency of Social Gatherings with Friends and Family:     Attends Voodoo Services:     Active Member of Clubs or Organizations:     Attends Club or Organization Meetings:     Marital Status:    Intimate Partner Violence:     Fear of Current or Ex-Partner:     Emotionally Abused:     Physically Abused:     Sexually Abused:      Current Outpatient Medications   Medication Sig Dispense Refill    pravastatin (PRAVACHOL) 40 MG tablet Take 20 mg by mouth daily Per Dr. Belén Swanson      NAPROXEN PO Take by mouth      chlorthalidone (HYGROTON) 25 MG tablet TAKE 1 TABLET BY MOUTH  DAILY 90 tablet 1    metFORMIN (GLUCOPHAGE-XR) 500 MG extended release tablet TAKE 1 TABLET BY MOUTH  TWICE DAILY 180 tablet 1    atorvastatin (LIPITOR) 40 MG tablet Take 0.5 tablets by mouth daily 45 tablet 1    valsartan (DIOVAN) 160 MG tablet Take 1 tablet by mouth daily 90 tablet 1    Misc.  Devices (ALL-BODY MASSAGE) MISC As directed 1 each 0    montelukast (SINGULAIR) 10 MG tablet TAKE 1 TABLET BY MOUTH AT  NIGHT 90 tablet 3    meloxicam (MOBIC) 15 MG tablet TAKE 1 TABLET BY MOUTH  DAILY 90 tablet 3    SYMBICORT 160-4.5 MCG/ACT AERO USE 2 INHALATIONS BY MOUTH  TWICE DAILY 30.6 g 3    [employer] Lancets Ultra Thin MISC 1 each by Does not apply route daily 100 each 3    blood glucose monitor strips Test 1 times a day 100 strip 3    albuterol sulfate HFA (PROAIR HFA) 108 (90 Base) MCG/ACT inhaler Inhale 1-2 puffs into the lungs every 4 hours as needed for Wheezing or Shortness of Breath 6 Inhaler 0    Acetaminophen (TYLENOL PO) Take  by mouth.  fluticasone (FLONASE) 50 MCG/ACT nasal spray 1-2 sprays by Nasal route nightly. 3 Bottle 1     No current facility-administered medications for this visit. Allergies   Allergen Reactions    Flagyl [Metronidazole]      hives    Daypro [Oxaprozin]     Sulfa Antibiotics     Tramadol      drowsy    Amlodipine      constipation    Lisinopril      Cough         REVIEW OF SYSTEMS  Review of Systems   Constitutional: Negative for activity change, appetite change, chills and fever. HENT: Negative for congestion, rhinorrhea and sore throat. Eyes: Negative for visual disturbance. Respiratory: Negative for cough, chest tightness, shortness of breath and wheezing. Cardiovascular: Negative for chest pain and leg swelling. Gastrointestinal: Positive for diarrhea. Negative for abdominal pain, nausea and vomiting. Genitourinary: Negative for dysuria, frequency, hematuria and urgency. Musculoskeletal: Positive for arthralgias. Negative for gait problem and myalgias. Skin: Negative for rash. Neurological: Negative for dizziness, weakness, light-headedness, numbness and headaches. Psychiatric/Behavioral: Negative for self-injury and sleep disturbance. PHYSICAL EXAM  /62 Comment: At home  Pulse 66   Temp 98.9 °F (37.2 °C) (Oral)   Wt 156 lb 6 oz (70.9 kg)   SpO2 97%   BMI 27.70 kg/m²   Physical Exam  Vitals reviewed.    Constitutional: General: She is not in acute distress. Appearance: Normal appearance. She is well-developed. She is not diaphoretic. HENT:      Head: Normocephalic and atraumatic. Right Ear: External ear normal.      Left Ear: External ear normal.      Nose: Nose normal.      Mouth/Throat:      Mouth: Mucous membranes are moist.      Pharynx: Oropharynx is clear. No oropharyngeal exudate or posterior oropharyngeal erythema. Eyes:      General:         Right eye: No discharge. Left eye: No discharge. Pupils: Pupils are equal, round, and reactive to light. Neck:      Vascular: No JVD. Cardiovascular:      Rate and Rhythm: Normal rate and regular rhythm. Pulses: Normal pulses. Pulmonary:      Effort: Pulmonary effort is normal. No respiratory distress. Breath sounds: No stridor. No wheezing, rhonchi or rales. Chest:      Chest wall: No tenderness. Abdominal:      General: Bowel sounds are normal. There is no distension. Palpations: Abdomen is soft. Tenderness: There is no abdominal tenderness. There is no guarding. Musculoskeletal:         General: No swelling or deformity. Normal range of motion. Cervical back: Normal range of motion and neck supple. Feet:      Comments: Monofilament test negative  Skin:     General: Skin is warm and dry. Capillary Refill: Capillary refill takes less than 2 seconds. Coloration: Skin is not pale. Findings: No bruising, lesion or rash. Neurological:      General: No focal deficit present. Mental Status: She is alert and oriented to person, place, and time. Psychiatric:         Mood and Affect: Mood normal.         Behavior: Behavior normal.        ASSESSMENT/PLAN:   1. Gastroesophageal reflux disease without esophagitis  Stable. Asymptomatic. No breakthrough dysphagia, nausea, excessive belching or indigestion.   Continue current treatment management follow-up in 6 months, sooner for new or worsening verbalized and acknowledges follow-up in 6 months, sooner for new or worsening symptoms. 7. Acute left-sided low back pain with left-sided sciatica  Patient reports ongoing low back pain. Patient reports history of right lower back pain with sciatica. Patient reports that over the past several weeks she has noticed left lower back pain. Patient has seen Dr. Ayaan Ballesteros to receive injections. Patient reports that she has also seen her chiropractor. Patient reports that pain is in her left lower buttocks and radiates down her leg. We did discuss stretching exercises and following up with physical therapy as well. Referral placed patient verbalized and acknowledges.  - PT eval and treat; Future         The note was completed using Dragon voice recognition transcription. Every effort was made to ensure accuracy; however, inadvertent  transcription errors may be present despite my best efforts to edit errors.     Izabela Maguire, APRN - CNP

## 2021-08-03 ENCOUNTER — OFFICE VISIT (OUTPATIENT)
Dept: FAMILY MEDICINE CLINIC | Age: 76
End: 2021-08-03
Payer: MEDICARE

## 2021-08-03 VITALS
WEIGHT: 156.38 LBS | HEART RATE: 66 BPM | TEMPERATURE: 98.9 F | OXYGEN SATURATION: 97 % | DIASTOLIC BLOOD PRESSURE: 62 MMHG | BODY MASS INDEX: 27.7 KG/M2 | SYSTOLIC BLOOD PRESSURE: 126 MMHG

## 2021-08-03 DIAGNOSIS — K58.0 IRRITABLE BOWEL SYNDROME WITH DIARRHEA: ICD-10-CM

## 2021-08-03 DIAGNOSIS — E78.00 PURE HYPERCHOLESTEROLEMIA: ICD-10-CM

## 2021-08-03 DIAGNOSIS — E55.9 VITAMIN D DEFICIENCY: ICD-10-CM

## 2021-08-03 DIAGNOSIS — M54.42 ACUTE LEFT-SIDED LOW BACK PAIN WITH LEFT-SIDED SCIATICA: ICD-10-CM

## 2021-08-03 DIAGNOSIS — I10 BENIGN ESSENTIAL HYPERTENSION: ICD-10-CM

## 2021-08-03 DIAGNOSIS — E11.9 CONTROLLED TYPE 2 DIABETES MELLITUS WITHOUT COMPLICATION, WITHOUT LONG-TERM CURRENT USE OF INSULIN (HCC): ICD-10-CM

## 2021-08-03 DIAGNOSIS — K21.9 GASTROESOPHAGEAL REFLUX DISEASE WITHOUT ESOPHAGITIS: Primary | ICD-10-CM

## 2021-08-03 LAB
A/G RATIO: 2.3 (ref 1.1–2.2)
ALBUMIN SERPL-MCNC: 4.6 G/DL (ref 3.4–5)
ALP BLD-CCNC: 83 U/L (ref 40–129)
ALT SERPL-CCNC: 13 U/L (ref 10–40)
ANION GAP SERPL CALCULATED.3IONS-SCNC: 15 MMOL/L (ref 3–16)
AST SERPL-CCNC: 13 U/L (ref 15–37)
BASOPHILS ABSOLUTE: 0 K/UL (ref 0–0.2)
BASOPHILS RELATIVE PERCENT: 0.7 %
BILIRUB SERPL-MCNC: 0.3 MG/DL (ref 0–1)
BUN BLDV-MCNC: 22 MG/DL (ref 7–20)
CALCIUM SERPL-MCNC: 9.4 MG/DL (ref 8.3–10.6)
CHLORIDE BLD-SCNC: 105 MMOL/L (ref 99–110)
CHOLESTEROL, FASTING: 184 MG/DL (ref 0–199)
CO2: 24 MMOL/L (ref 21–32)
CREAT SERPL-MCNC: 0.7 MG/DL (ref 0.6–1.2)
CREATININE URINE POCT: 100
EOSINOPHILS ABSOLUTE: 0.2 K/UL (ref 0–0.6)
EOSINOPHILS RELATIVE PERCENT: 3.3 %
GFR AFRICAN AMERICAN: >60
GFR NON-AFRICAN AMERICAN: >60
GLOBULIN: 2 G/DL
GLUCOSE BLD-MCNC: 127 MG/DL (ref 70–99)
HCT VFR BLD CALC: 36.9 % (ref 36–48)
HDLC SERPL-MCNC: 64 MG/DL (ref 40–60)
HEMOGLOBIN: 12.6 G/DL (ref 12–16)
LDL CHOLESTEROL CALCULATED: 86 MG/DL
LYMPHOCYTES ABSOLUTE: 1.1 K/UL (ref 1–5.1)
LYMPHOCYTES RELATIVE PERCENT: 23.8 %
MCH RBC QN AUTO: 31.4 PG (ref 26–34)
MCHC RBC AUTO-ENTMCNC: 34.1 G/DL (ref 31–36)
MCV RBC AUTO: 92.1 FL (ref 80–100)
MICROALBUMIN/CREAT 24H UR: 10 MG/G{CREAT}
MICROALBUMIN/CREAT UR-RTO: <30
MONOCYTES ABSOLUTE: 0.4 K/UL (ref 0–1.3)
MONOCYTES RELATIVE PERCENT: 8.9 %
NEUTROPHILS ABSOLUTE: 2.9 K/UL (ref 1.7–7.7)
NEUTROPHILS RELATIVE PERCENT: 63.3 %
PDW BLD-RTO: 14.2 % (ref 12.4–15.4)
PLATELET # BLD: 219 K/UL (ref 135–450)
PMV BLD AUTO: 9.6 FL (ref 5–10.5)
POTASSIUM SERPL-SCNC: 4.3 MMOL/L (ref 3.5–5.1)
RBC # BLD: 4.01 M/UL (ref 4–5.2)
SODIUM BLD-SCNC: 144 MMOL/L (ref 136–145)
TOTAL PROTEIN: 6.6 G/DL (ref 6.4–8.2)
TRIGLYCERIDE, FASTING: 172 MG/DL (ref 0–150)
VITAMIN D 25-HYDROXY: 43.9 NG/ML
VLDLC SERPL CALC-MCNC: 34 MG/DL
WBC # BLD: 4.6 K/UL (ref 4–11)

## 2021-08-03 PROCEDURE — 3046F HEMOGLOBIN A1C LEVEL >9.0%: CPT | Performed by: NURSE PRACTITIONER

## 2021-08-03 PROCEDURE — 99214 OFFICE O/P EST MOD 30 MIN: CPT | Performed by: NURSE PRACTITIONER

## 2021-08-03 PROCEDURE — 1090F PRES/ABSN URINE INCON ASSESS: CPT | Performed by: NURSE PRACTITIONER

## 2021-08-03 PROCEDURE — 4040F PNEUMOC VAC/ADMIN/RCVD: CPT | Performed by: NURSE PRACTITIONER

## 2021-08-03 PROCEDURE — 3288F FALL RISK ASSESSMENT DOCD: CPT | Performed by: NURSE PRACTITIONER

## 2021-08-03 PROCEDURE — 1123F ACP DISCUSS/DSCN MKR DOCD: CPT | Performed by: NURSE PRACTITIONER

## 2021-08-03 PROCEDURE — 3017F COLORECTAL CA SCREEN DOC REV: CPT | Performed by: NURSE PRACTITIONER

## 2021-08-03 PROCEDURE — 36415 COLL VENOUS BLD VENIPUNCTURE: CPT | Performed by: NURSE PRACTITIONER

## 2021-08-03 PROCEDURE — 82044 UR ALBUMIN SEMIQUANTITATIVE: CPT | Performed by: NURSE PRACTITIONER

## 2021-08-03 PROCEDURE — G8427 DOCREV CUR MEDS BY ELIG CLIN: HCPCS | Performed by: NURSE PRACTITIONER

## 2021-08-03 PROCEDURE — 2022F DILAT RTA XM EVC RTNOPTHY: CPT | Performed by: NURSE PRACTITIONER

## 2021-08-03 PROCEDURE — G8399 PT W/DXA RESULTS DOCUMENT: HCPCS | Performed by: NURSE PRACTITIONER

## 2021-08-03 PROCEDURE — G8417 CALC BMI ABV UP PARAM F/U: HCPCS | Performed by: NURSE PRACTITIONER

## 2021-08-03 PROCEDURE — 1036F TOBACCO NON-USER: CPT | Performed by: NURSE PRACTITIONER

## 2021-08-03 RX ORDER — PRAVASTATIN SODIUM 40 MG
20 TABLET ORAL DAILY
COMMUNITY
End: 2021-09-30

## 2021-08-03 ASSESSMENT — ENCOUNTER SYMPTOMS
WHEEZING: 0
ABDOMINAL PAIN: 0
VOMITING: 0
NAUSEA: 0
CHEST TIGHTNESS: 0
SORE THROAT: 0
COUGH: 0
DIARRHEA: 1
SHORTNESS OF BREATH: 0
RHINORRHEA: 0

## 2021-08-03 ASSESSMENT — PATIENT HEALTH QUESTIONNAIRE - PHQ9
SUM OF ALL RESPONSES TO PHQ QUESTIONS 1-9: 0
2. FEELING DOWN, DEPRESSED OR HOPELESS: 0
1. LITTLE INTEREST OR PLEASURE IN DOING THINGS: 0
SUM OF ALL RESPONSES TO PHQ9 QUESTIONS 1 & 2: 0

## 2021-08-03 NOTE — PATIENT INSTRUCTIONS
Please read the healthy family handout that you were given and share it with your family. Please compare this printed medication list with your medications at home to be sure they are the same. If you have any medications that are different please contact us immediately at [phone]. Also review your allergies that we have listed, these may also include medications that you have not been able to tolerate, make sure everything listed is correct. If you have any allergies that are different please contact us immediately at [phone].

## 2021-08-04 LAB
ESTIMATED AVERAGE GLUCOSE: 148.5 MG/DL
HBA1C MFR BLD: 6.8 %

## 2021-08-17 ENCOUNTER — HOSPITAL ENCOUNTER (OUTPATIENT)
Dept: PHYSICAL THERAPY | Age: 76
Setting detail: THERAPIES SERIES
Discharge: HOME | End: 2021-08-17
Payer: MEDICARE

## 2021-08-17 PROCEDURE — 97140 MANUAL THERAPY 1/> REGIONS: CPT

## 2021-08-17 PROCEDURE — 97162 PT EVAL MOD COMPLEX 30 MIN: CPT

## 2021-08-17 PROCEDURE — 97110 THERAPEUTIC EXERCISES: CPT

## 2021-08-17 NOTE — PROGRESS NOTES
[facility], [address]  Phone: [phone]                                                    Physical Therapy Certification    We had the pleasure of evaluating the following patient for physical therapy services at [address] [place] [address]. A summary of our findings can be found in the initial assessment below. This includes our plan of care. If you have any questions or concerns regarding these findings, please do not hesitate to contact me at the office phone number checked above.   Thank you for the referral.       Physician Signature:_______________________________Date:__________________  By signing above (or electronic signature), therapists plan is approved by physician    [address] [facility] EVALUATION    Patient: Ann Hawkins   : 1945   MRN: [id number]       Medical Diagnosis: Acute Left side LBP with left side sciatica (M54.42)  Treatment Diagnosis:   Pelvic/sacral dysfunction, core weakness, leg length difference, decreased functional status     Onset Date: 1 year ago  Referral Date:  8/3/21  Referring Physician:   JANAK Mark     Visits Allowed/Insurance/Certification Information:  Medicare/ [employer]      Restrictions/Precautions: osteopenia, DM, HTN     C-SSRS Triggered by Intake questionnaire (Past 2 wk assessment):   [x] No, Questionnaire did not trigger screening.   [] Yes, Patient intake triggered further evaluation      [] C-SSRS Screening completed  [] PCP notified via Plan of Care  [] Emergency services notified     Pt's Occupation/Job Duties:  Retired, enjoys: pickle ball, walking, yard work      Social support/Environment:    Family/caregiver support:   [x]Yes, , lives with    []No    Home Environment:   []1 story   [x]>2 story with basement   [x] ELYSIA-3   []No rail   [x]Handrail     Health History reviewed with pt:   [x]Yes   []No      PMH:  HLD, diabetes type 2, chest pain, appy, D&C, epidural injections R L2, L5, S1 on 6/24/19, and on 12/3/19, B RTC repair, hernia repair, hemorrrhoid surgery, hysterectomy, depression, HTN, IBS     SUBJECTIVE FINDINGS         History of Present Illness:         Pt presents with insidious onset history of low back pain for the past 2 years, R>L. She had epidurals to her R lumbar spine in 2019, which relieved all of her low back pain, until about Jan 2021. She had an insidious onset of left side low back pain, radiating down left leg to mid calf region in L5 distribution. She has seen a chiropractor for over 20 years off and on. She had an MRI of her lumbar spine on 5/15/19 with results as follows:     CONCLUSION:   1. A broad spondylotic L4-5 disc displacement eccentric to the left in combination with marked    facet hypertrophy moderately narrows the central canal and left greater than right lateral    recesses with L5 root effacement and markedly narrows the left root foramen with L4 root    effacement.  A superimposed right foraminal/extraforaminal mixed spondylotic disc displacement    in combination with marked facet hypertrophy markedly narrows the right root foramen with L4    root effacement intra and extraforaminally.  Grade 1 apophyseal-based anterolisthesis. 2. A concentric spondylotic L5-S1 disc displacement eccentric to the left in combination with    marked left greater than right facet hypertrophy markedly narrows the left root foramen with L5    root effacement. 3. A bilobed spondylotic L3-4 disc displacement eccentric to the right in combination with    facet hypertrophy moderate to markedly narrows the right and mildly narrows the left root    foramen with L3 root effacement. 4. A bilobed spondylotic L2-3 disc displacement eccentric to the right in combination with    right greater than left facet hypertrophy moderately narrows the right root foramen with L2    root effacement.    5. A combination of levocurvature of the lumbar spine, multilevel listhetic microinstability,    multilevel spondylotic disc disease and varying levels of facet hypertrophy with capsulitis may    all contribute to the patient's history of low back pain. She had an epidural to her L L5-S1 in 2021 by Dr. Bing Abbott. She felt good for the first 1-2 weeks, and then the pain returned after playing pickle ball, and mowing the yard. She has restricted her activity over the past week, and has had some relief in her pain. Taking anti-inflammatories for pain. Now referred for PT treatment. Pain       Patient describes pain to be intermittent in L buttock, radiating down her leg to mid calf region  Patient reports pain is  0/10 pain at present and  5-6/10 pain at its worst.  Worsened by:  Rotation, twisting, vacuuming, forward bending, standing >10 min, walking >15-20 min    Improved by:  Rest, sitting    Pt. reports pain with coughing, sneezing and laughing:    []Yes   [x]No   []NA   Pt. reports bowel and bladder changes:      []Yes   [x]No   []NA   Pt. reports knee/hip/ankle crepitus, locking, buckling:   []Yes   [x]No   []NA     Current Functional Limitations:   [x]Yes   []No  Functional complaints:  Limited with   Weighted Rotation, twisting, vacuuming, forward bending, standing >10 min, walking >15-20 min     PLOF:   [x]No functional limitations   []Pt with previous functional limitations   Pt's sleep is affected?    [x]Yes, not sleeping well due to pain   []No     Patient goal for therapy:  \"do what I want without pain\"          OBJECTIVE FINDINGS         Gait/Steps/Balance       []Gait WNL unless otherwise noted below:                             [x]Deviations on a level linoleum surface include:  Left lat shift    [x]Steps WNL (reciprocal pattern with 0-1 rail) unless otherwise noted below:    []Deviations include:     [x]All balance WNL unless otherwise noted below:      Static/ Dynamic Sitting:    Static/Dynamic Standin sec B    Quick Tests/Functional Myotome Tests:   Unilateral sit to stand (L1-3)   [x]NT  []Able to perform WNL   []Unable to perform     Heel Walk (L4):       []NT  [x]Able to perform WNL   []Unable to perform         Toe Walk (S1):        []NT  [x]Able to perform WNL   []Unable to perform        Posture: [] WNL  []Forward head   []Forward flexed trunk    []Pronounced CT junction    []Increased thoracic kyphosis   []Increased lumbar lordosis   []Scoliosis   []Swayback   []Decreased WB on   []R   []L     [x]Other:  Left lateral shift     Special Tests- standing   (C)= Magen's Criteria: 3/4 Positive tests or (+) Fortins sign with two additional (+) tests  Special Test Findings   Winifred's Sign                (C)                  []Neg   []Pos R   [x]Pos L   []NT   Standing Landmarks []Iliac Crests Equal   [x]R High  []L High  []PSIS Equal   []R High  []L High  []ASIS Equal   []R High  []L High   []NT  []Difficult to assess due to body habitus    Standing Flexion Test  (C) [x]Neg   []Pos R   []Pos L   []NT   March Test (Gillet's Test) [x]Neg   []Pos R   []Pos L   []NT   Sacral Sulci Test  []Neg   []Pos R   []Pos L   []NT   Cigarette Butt Test:  []Neg   []Pos R   []Pos L   []NT     Lumbar Range of MotionTesting      [x]All WNL except as marked below     ROM (*denotes pain) AROM COMMENTS   Flexion 105    Extension 30    Sidebending Left 34    Sidebending Right 20    Rotation Left  80 []Seated  [x]Standing       Rotation Right 80 []Seated  [x]Standing         Special Tests- seated     Special Test Findings   Seated pelvic landmarks (C) [x]Iliac Crests Equal   []R High   []L High  []PSIS Equal   []R High  []L High  []Difficult to assess due to body habitus   Sitting flexion test  [x]Neg   []Pos R   []Pos L   []NT   Hip IR PROM  [x]WNL []Pos R    []Pos L   []NT        Slump test/Dural tension  [x]Neg   []Pos R   []Pos L   []NT       Reflexes     [x]All reflexes WNL (2+) or negative test except as marked below    [x]All strength WNL (5/5) or negative test except as marked below    Reflex Left Right Comments   Casandra's Reflex      Biceps (C5,6)      Brachioradialis (C6)      Triceps (C7)      Quadriceps (L3,4)   2 2 []Pendular x 3 R  []Pendular x 3 L   Achilles (S1,2) 2 2    Ankle clonus   []# Beats   Babinski's reflex         Sensation   [x]All dermatomes WNL for light touch     Range of Motion/Strength Testing-Myotomes    [x]All ROM WNL except as marked below    [x]All strength WNL (5/5) except as marked below (measured out of 5)   [x]All  myotomes WNL (5/5) except as marked below (measured out of 5)      Range Tested AROM PROM MMT/Resisted Comments   *denotes pain Left Right Left Right Left Right    Hip Flexion  (L1-2)     4/5 4/5    Hip Extension     3+/5 3+/5    Hip Abduction  (L5)     4-/5 4-/5    Hip Adduction  (L3)     3/5 3/5    Hip IR          Hip ER          Knee Flexion  (L5,S1)     4/5 5/5    Knee Extension  (L3,4)     5/5 5/5    Ankle Dorsiflex  (L4)     5/5 5/5    Ankle Plantarflex  (S1,2)     4/5 4/5    Ankle Inversion          Ankle Eversion          Great Toe Ext  (L5)     5/5 5/5        Flexibility     Muscle Findings   Hip flexors/Max  []WNL   []Decreased R   []Decreased L   []NT   Hamstrings  Degrees in 90/90 []WNL   [x]Decreased R   [x]Decreased L   []NT  Right:  -25            Left: -35     Gastrocs []WNL   []Decreased R   []Decreased L   []NT   Obers/TFL/ITB []WNL   []Decreased R   []Decreased L   []NT   Piriformis  []WNL   [x]Decreased R   [x]Decreased L   []NT   Other:  []WNL   []Decreased R   []Decreased L   []NT     Special Tests Lumbosacral and hip- supine/sidelying/prone    (L) = Laslett's Criteria: 2 positive tests  Special Test Findings   Sit up test/ Supine Long sit test  (C) []Neg   [x]Pos R   []Pos L   []NT  Comments:  R ant pelvic rotation   SI distraction                               (L) []Neg   []Pos   []NT   Thigh Thrust test                         (L) [x]Neg   []Pos R   []Pos L   []NT 90/90 test  []Neg   []Pos R   []Pos L   []NT   Gaenslen's test []Neg   []Pos R   []Pos L   []NT   Straight Leg Raise [x]Neg   []Pos R   []Pos L   []NT   Crams []Neg   []Pos R   []Pos L   []NT   Lumbar Distraction  []Relief noted   []No relief noted  []Rebound pain   []NT   Hip scour [x]Neg   []Pos R   []Pos L   []NT   Maddison's test [x]Neg   []Pos R   []Pos L   []NT   Ravi's test []Neg   []Pos R   []Pos L   []NT   Oscillation []Neg   []Pos R   []Pos L   []NT   Ant/Post Provocation  []Neg   []Pos R   []Pos L   []NT   SI compression                           (L) []Neg   []Pos   []NT   Prone knee flexion test               (C) []Neg   []Pos R   []Pos L   []NT  Comments:    Femoral nerve tension test []Neg   []Pos R   []Pos L   []NT   Pheasant test []Neg   []Pos R   []Pos L   []NT   Sacral thrusts                              (L) []Neg   []Pos   []NT  []Base   []Kentwood   []R Sacral Sulcus  []L Sacral Sulcus   []R LAURA   []L LAURA     Palpation   R anterior pelvic rotation with L on R sacral torsion    Patient reported tenderness with palpation:  [x]Yes   []No   []NT  Location:  L SI joint, L piriformis, along sp L1-5, B lumbar paraspinals  PT notes warmth:  []Yes   [x]No   []NT  Location:   PT notes increased muscle tone:   [x]Yes   []No   []NT  Location:  B hamstrings and piriformis L>R  PT notes crepitus with palpation:   []Yes   [x]No   []NT   Location:     Girth Measurements (cm)        Location Left Right Location Left Right   2\" above mid patella   3\" inferior to inferior patellar pole     Mid patella   6\" inferior to inferior patellar pole     2\" below mid patella   Malleoli     Inferior patellar pole   Figure 8     Leg length 84.2 82.2 Met heads        Co-morbidities/Complexities (which will affect course of rehabilitation):  []None           Arthritic conditions   []Rheumatoid arthritis (M05.9)  [x]Osteoarthritis (M19.91)   Cardiovascular conditions   [x]Hypertension (I10)  []Hyperlipidemia (E78.5)  []Angina pectoris (I20)  []Atherosclerosis (I70)   Musculoskeletal conditions   []Disc pathology   []Congenital spine pathologies   []Prior surgical intervention  []Osteoporosis (M81.8)  [x]Osteopenia (M85.8)   Endocrine conditions   []Hypothyroid (E03.9)  []Hyperthyroid Gastrointestinal conditions   []Constipation (E72.20)   Metabolic conditions   []Morbid obesity (E66.01)  [x]Diabetes type 1(E10.65) or 2 (E11.65)   []Neuropathy (G60.9)     Pulmonary conditions   []Asthma (J45)  []Coughing   []COPD (J44.9)   Psychological Disorders  []Anxiety (F41.9)  [x]Depression (F32.9)   []Other:   []Other:          Functional Outcome Measure:   []NA  Measure Used:  oswestry  Date Assessed:  8/17/21  Score: 32%    ASSESSMENT: Patient presents with s/s consistent with Dx. Recommend PT treatment to address problem list so that the patient may return to regular activities without any functional limitations noted.    Functional Impairments:     []Noted lumbar/proximal hip/LE joint hypomobility   []Decreased LE functional ROM   [x]Decreased core/proximal hip strength and neuromuscular control   [x]Decreased LE functional strength   [x]Reduced balance/proprioceptive control   []other:      Functional Activity Limitations (from functional questionnaire and intake)   [x]Reduced ability to tolerate prolonged functional positions   []Reduced ability or difficulty with changes of positions or transfers between positions   [x]Reduced ability to maintain good posture and demonstrate good body mechanics with sitting, bending, and lifting   [x]Reduced ability to sleep   [] Reduced ability or tolerance with driving and/or computer work   [x]Reduced ability to perform lifting, carrying tasks   [x]Reduced ability to squat   [x]Reduced ability to forward bend   [x]Reduced ability to ambulate prolonged functional periods/distances/surfaces   [x]Reduced ability to ascend/descend stairs   []Reduced ability to run, hop, cut or jump   []other:    Participation Restrictions   []Reduced participation in self care activities   [x]Reduced participation in home management activities   []Reduced participation in work activities   []Reduced participation in social activities. [x]Reduced participation in sport/recreation activities. Classification :    []Signs/symptoms consistent with post-surgical status including decreased ROM, strength and function. []Signs/symptoms consistent with joint sprain/strain   []Signs/symptoms consistent with patella-femoral syndrome   []Signs/symptoms consistent with knee OA/hip OA   []Signs/symptoms consistent with internal derangement of knee/Hip   [x]Signs/symptoms consistent with functional hip weakness/NMR control      []Signs/symptoms consistent with tendinitis/tendinosis    []signs/symptoms consistent with pathology which may benefit from Dry needling      [x]other:  Pelvic sacral dysfunction     Rehabilitation Potential:  Good for goals listed below. Strengths for achieving goals include:   [x]Pt motivated   [x]PLOF   [x]Family support   Limitations for achieving goals include: []Pain  []Severity of condition     []Cognitive   []Lack of family support   []Lack of resources     []Other:         Prognosis:   [x]Good   []Fair   []Poor    GOALS:  Short Term Goals: 2 weeks  1. Independent in HEP and progression per patient tolerance, in order to prevent re-injury. [] Progressing: [] Met: [] Not Met: [] Adjusted   2. Patient will have a decrease in pain to facilitate improvement in movement, function, and ADLs as indicated by Functional Deficits. [] Progressing: [] Met: [] Not Met: [] Adjusted     Long Term Goals:8 weeks  1. Disability index score of 20% or less for the oswestry functional questionnaire to assist with reaching prior level of function. [] Progressing: [] Met: [] Not Met: [] Adjusted   2.  Patient will demonstrate increased AROM of lumbar and HS to WNL to allow for proper joint functioning as indicated by patients

## 2021-08-17 NOTE — FLOWSHEET NOTE
[address] [place] [address] [facility] [address]  Phone [phone]  Fax [phone]    Outpatient Physical Therapy     [x] Daily Treatment Note   [] Progress Note   [] Discharge Note    Date:  8/17/2021    Patient Name:  Connie Edmond         YOB: 1945    Medical Diagnosis: Acute Left side LBP with left side sciatica (M54.42)  Treatment Diagnosis:   Pelvic/sacral dysfunction, core weakness, leg length difference, decreased functional status     Onset Date: 1 year ago  Referral Date:  8/3/21  Referring Physician:   JANAK Santos     Visits Allowed/Insurance/Certification Information:  Medicare/ [employer]      Restrictions/Precautions: osteopenia, DM, HTN      Plan of care sent to provider:   []NA   []Faxed   [x]Co-signature       Plan of care signed:    [x]NA   []Yes   []No      Progress report will be due (10 Rx or 30 days whichever is less): 2/38/48     Recertification will be due (POC Duration  / 90 days whichever is less): 11/17/21     Visit# / total visits:     Visit # Insurance Allowable Auth Required   In Person 1/16 Med nec []  Yes     [x]  No    Tele Health 0  []  Yes     []  No    Total 1/16       Plan for Next Session:  Assess effectiveness of heel lift, assess alignment, add bridges, cat camel, prayer stretch, open book stretch, resistance with TA exercises, prone hip IR/ER, prone alt leg lifts      Subjective:  See eval     Pain level:   AT EVAL:  Patient describes pain to be intermittent in L buttock, radiating down her leg to mid calf region  Patient reports pain is  0/10 pain at present and  5-6/10 pain at its worst.  Worsened by:  Rotation, twisting, vacuuming, forward bending, standing >10 min, walking >15-20 min        Objective:       Exercises:    Exercises in bold performed in department today. Items not bolded are carried forward from prior visits for continuity of the record.   Exercise/Equipment Resistance/Repetitions Issued for HEP     TA 6 sec/ x10      TA with march x10      TA with controlled knee falllout x10      sidelying clam shells x10      sidelying hip abd x10      Supine HS stretch 30 sec/ x3      Supine piriformis stretch 30 sec/ x3                                                                                      Therapeutic Exercise/Home Exercise Program:   x30 min. Patient educated on eval findings, plan of care, and goals. Instructed in HEP with handout given. Group Therapy:    0 minutes    Therapeutic Activity:  0 minutes     Gait: 0 minutes    Neuromuscular Re-Education:  0 minutes      Canalith Repositioning Procedure:  0 minutes    Manual Therapy:  15 minutes  Patient noted to have R anterior pelvic rotation with L on R sacral torsion    Performed MET, to correct dysfunction with  Neutral alignment achieved. Issued heel lift due to leg length difference L>R      Modalities: 0 minutes   [] GAME READY (VASO)- for significant edema, swelling, pain control. Functional Outcome Measure:   []NA  Measure Used:  oswestry  Date Assessed:  8/17/21  Score: 32%     Assessment/Treatment/Activity Tolerance:    Patients response to treatment:   [x]Patient tolerated treatment well with no adverse reactions noted    []Patient limited by fatigue   []Patient limited by pain    []Patient limited by other medical complications   [x]Other:  Pain decreased to 0/10 after treatment. Goals:   Progress towards goals:  Goals established on 8/17/21  GOALS:  Short Term Goals: 2 weeks  1. Independent in HEP and progression per patient tolerance, in order to prevent re-injury. []? Progressing: []? Met: []? Not Met: []? Adjusted            2. Patient will have a decrease in pain to facilitate improvement in movement, function, and ADLs as indicated by Functional Deficits. []? Progressing: []? Met: []? Not Met: []? Adjusted               Long Term Goals:8 weeks  1.  Disability index score of 20% or less for the oswestry functional questionnaire to assist with reaching prior level of function. []? Progressing: []? Met: []? Not Met: []? Adjusted           2. Patient will demonstrate increased AROM of lumbar and HS to WNL to allow for proper joint functioning as indicated by patients Functional Deficits. []? Progressing: []? Met: []? Not Met: []? Adjusted            3. Patient will demonstrate an increase in strength to 4+/5 or greater throughout B LE's to allow for proper functional mobility as indicated by patients Functional Deficits. []? Progressing: []? Met: []? Not Met: []? Adjusted            4. Patient will return to all transfers, work activities, and functional activities without increased symptoms or restriction. []? Progressing: []? Met: []? Not Met: []? Adjusted            5. Patient will have 0-3/10 pain with ADL's.  []? Progressing: []? Met: []? Not Met: []? Adjusted            6. Patient stated goal: return to pickle ball and yard work without limitations  []? Progressing: []? Met: []? Not Met: []? Adjusted     Prognosis: [x]Good   []Fair   []Poor    Patient Requires Follow-up:  [x]Yes  []No    Plan: [x]Plan of care initiated     []Continue per plan of care    [] Alter current plan (see comments)    []Hold pending MD visit []Discharge    Charges:  Timed Code Treatment Minutes: 45   Total Treatment Minutes:  60   BWC time for each procedure?:  TE TIME:  NMR TIME:  MANUAL TIME:  UNTIMED MINUTES:       [] EVAL (LOW) 02389 (typically 20 minutes face-to-face)  [x] EVAL (MOD) 72377 (typically 30 minutes face-to-face)  [] EVAL (HIGH) 09321 (typically 45 minutes face-to-face)  [] RE-EVAL     [x] UV(79729) x2    [] IONTO  [] NMR (38420) x     [] VASO  [x] Manual (02732) x 1    [] Other:  [] TA x      [] Mech Traction (57258)  [] ES(attended) (46840)     [] ES (un) (02926):     Medicare Cap total YTD:  $185     Electronically signed by:  Timoteo Humberto, PT, MPT, OMT-C [id number]      Note: If patient does not

## 2021-08-20 ENCOUNTER — APPOINTMENT (OUTPATIENT)
Dept: PHYSICAL THERAPY | Age: 76
End: 2021-08-20
Payer: MEDICARE

## 2021-08-24 ENCOUNTER — HOSPITAL ENCOUNTER (OUTPATIENT)
Dept: PHYSICAL THERAPY | Age: 76
Setting detail: THERAPIES SERIES
Discharge: HOME | End: 2021-08-24
Payer: MEDICARE

## 2021-08-24 ENCOUNTER — TELEPHONE (OUTPATIENT)
Dept: FAMILY MEDICINE CLINIC | Age: 76
End: 2021-08-24

## 2021-08-24 PROCEDURE — 97140 MANUAL THERAPY 1/> REGIONS: CPT

## 2021-08-24 PROCEDURE — 97110 THERAPEUTIC EXERCISES: CPT

## 2021-08-24 NOTE — TELEPHONE ENCOUNTER
Patient calling about referral you gave her for Dr Maximo Alfaro for her IBS, she said they could not schedule her with him but was able to her get her in with Dr Davide Goldstein and she wanted to see if you were familiar with him

## 2021-08-24 NOTE — FLOWSHEET NOTE
[address] [place] [address] [facility] [address]  Phone [phone]  Fax [phone]    Outpatient Physical Therapy     [x] Daily Treatment Note   [] Progress Note   [] Discharge Note    Date:  8/24/2021    Patient Name:  Maria Alejandra Paulson         YOB: 1945    Medical Diagnosis: Acute Left side LBP with left side sciatica (M54.42)  Treatment Diagnosis:   Pelvic/sacral dysfunction, core weakness, leg length difference, decreased functional status     Onset Date: 1 year ago  Referral Date:  8/3/21  Referring Physician:   JANAK Adams     Visits Allowed/Insurance/Certification Information:  Medicare/ [employer]      Restrictions/Precautions: osteopenia, DM, HTN      Plan of care sent to provider:   []NA   []Faxed   [x]Co-signature       Plan of care signed:    [x]NA   []Yes   []No      Progress report will be due (10 Rx or 30 days whichever is less): 4/60/57     Recertification will be due (POC Duration  / 90 days whichever is less): 11/17/21     Visit# / total visits:     Visit # Insurance Allowable Auth Required   In Person 2/16 Med nec []  Yes     [x]  No    Tele Health 0  []  Yes     []  No    Total 2/16       Plan for Next Session:  Assess effectiveness of heel lift, assess alignment, add bridges, open book stretch,     Subjective:  Reports she has been feeling better - thinks the heel lift has helped     Pain level: 0/10  AT EVAL:  Patient describes pain to be intermittent in L buttock, radiating down her leg to mid calf region  Patient reports pain is  0/10 pain at present and  5-6/10 pain at its worst.  Worsened by:  Rotation, twisting, vacuuming, forward bending, standing >10 min, walking >15-20 min        Objective:       Exercises:    Exercises in bold performed in department today. Items not bolded are carried forward from prior visits for continuity of the record.   Exercise/Equipment Resistance/Repetitions Issued for HEP     TA 6 sec/ x10 x     TA with march Red band 2x10 x     TA with controlled knee falllout Red band 2x10 x     sidelying clam shells 2x10 x     sidelying hip abd 2x10 x     Supine HS stretch 30 sec/ x3 x     Supine piriformis stretch 30 sec/ x3 x     prone hip IR/ER x10 x     prone alt leg lifts nv      cat camel    x5 x     prayer stretch   3x10\"   Limited due to bad right shoulder x                                                      Therapeutic Exercise/Home Exercise Program:   x24 min. Instructed in HEP with handout given. Group Therapy:    0 minutes    Therapeutic Activity:  0 minutes     Gait: 0 minutes    Neuromuscular Re-Education:  0 minutes      Canalith Repositioning Procedure:  0 minutes    Manual Therapy:  10 minutes  Patient noted to have R anterior pelvic rotation with no sacral torsion    Performed MET, to correct dysfunction with  Neutral alignment achieved. Modalities: 0 minutes   [] GAME READY (VASO)- for significant edema, swelling, pain control. Functional Outcome Measure:   []NA  Measure Used:  oswestry  Date Assessed:  8/17/21  Score: 32%     Assessment/Treatment/Activity Tolerance:    Patients response to treatment:   [x]Patient tolerated treatment well with no adverse reactions noted    []Patient limited by fatigue   []Patient limited by pain    []Patient limited by other medical complications   [x]Other:  Pain decreased to 0/10 after treatment. Goals:   Progress towards goals:  Goals established on 8/17/21  GOALS:  Short Term Goals: 2 weeks  1. Independent in HEP and progression per patient tolerance, in order to prevent re-injury. []? Progressing: []? Met: []? Not Met: []? Adjusted            2. Patient will have a decrease in pain to facilitate improvement in movement, function, and ADLs as indicated by Functional Deficits. []? Progressing: []? Met: []? Not Met: []? Adjusted               Long Term Goals:8 weeks  1.  Disability index score of 20% or less for the oswestry functional questionnaire to assist with reaching prior level of function. []? Progressing: []? Met: []? Not Met: []? Adjusted           2. Patient will demonstrate increased AROM of lumbar and HS to WNL to allow for proper joint functioning as indicated by patients Functional Deficits. []? Progressing: []? Met: []? Not Met: []? Adjusted            3. Patient will demonstrate an increase in strength to 4+/5 or greater throughout B LE's to allow for proper functional mobility as indicated by patients Functional Deficits. []? Progressing: []? Met: []? Not Met: []? Adjusted            4. Patient will return to all transfers, work activities, and functional activities without increased symptoms or restriction. []? Progressing: []? Met: []? Not Met: []? Adjusted            5. Patient will have 0-3/10 pain with ADL's.  []? Progressing: []? Met: []? Not Met: []? Adjusted            6. Patient stated goal: return to pickle ball and yard work without limitations  []? Progressing: []? Met: []? Not Met: []? Adjusted     Prognosis: [x]Good   []Fair   []Poor    Patient Requires Follow-up:  [x]Yes  []No    Plan: []Plan of care initiated     [x]Continue per plan of care    [] Alter current plan (see comments)    []Hold pending MD visit []Discharge    Charges:  Timed Code Treatment Minutes: 34   Total Treatment Minutes:  34   BWC time for each procedure?:  TE TIME:  NMR TIME:  MANUAL TIME:  UNTIMED MINUTES:       [] EVAL (LOW) 24419 (typically 20 minutes face-to-face)  [] EVAL (MOD) 80616 (typically 30 minutes face-to-face)  [] EVAL (HIGH) 62549 (typically 45 minutes face-to-face)  [] RE-EVAL     [x] RP(46341) x1   [] IONTO  [] NMR (63829) x     [] VASO  [x] Manual (45523) x 1    [] Other:  [] TA x      [] Mech Traction (59531)  [] ES(attended) (34637)     [] ES (un) (04751):     Medicare Cap total YTD:  $241    Electronically signed by:  LANDEN Green      Note: If patient does not return for scheduled/

## 2021-08-26 ENCOUNTER — HOSPITAL ENCOUNTER (OUTPATIENT)
Dept: PHYSICAL THERAPY | Age: 76
Setting detail: THERAPIES SERIES
Discharge: HOME | End: 2021-08-26
Payer: MEDICARE

## 2021-08-26 PROCEDURE — 97140 MANUAL THERAPY 1/> REGIONS: CPT

## 2021-08-26 PROCEDURE — 97110 THERAPEUTIC EXERCISES: CPT

## 2021-08-26 NOTE — FLOWSHEET NOTE
[address], [facility] [address]  Phone [phone]  Fax [phone]    Outpatient Physical Therapy     [x] Daily Treatment Note   [] Progress Note   [] Discharge Note    Date:  8/26/2021    Patient Name:  Minnie Olivas         YOB: 1945    Medical Diagnosis: Acute Left side LBP with left side sciatica (M54.42)  Treatment Diagnosis:   Pelvic/sacral dysfunction, core weakness, leg length difference, decreased functional status     Onset Date: 1 year ago  Referral Date:  8/3/21  Referring Physician:   JANAK Quinonez     Visits Allowed/Insurance/Certification Information:  Medicare/ [employer]      Restrictions/Precautions: osteopenia, DM, HTN      Plan of care sent to provider:   []NA   []Faxed   [x]Co-signature       Plan of care signed:    [x]NA   []Yes   []No      Progress report will be due (10 Rx or 30 days whichever is less): 3/24/74     Recertification will be due (POC Duration  / 90 days whichever is less): 11/17/21     Visit# / total visits:     Visit # Insurance Allowable Auth Required   In Person 3/16 Med nec []  Yes     [x]  No    Tele Health 0  []  Yes     []  No    Total 3/16       Plan for Next Session:  Assess effectiveness of heel lift, assess alignment, add bridges, open book stretch,     Subjective:  Reports she was really sore yesterday 5-6/10 but is doing better today      Pain level: 0/10  AT EVAL:  Patient describes pain to be intermittent in L buttock, radiating down her leg to mid calf region  Patient reports pain is  0/10 pain at present and  5-6/10 pain at its worst.  Worsened by:  Rotation, twisting, vacuuming, forward bending, standing >10 min, walking >15-20 min        Objective:       Exercises:    Exercises in bold performed in department today. Items not bolded are carried forward from prior visits for continuity of the record.   Exercise/Equipment Resistance/Repetitions Issued for HEP     TA 6 sec/ x10 x     TA with march Red band 3x10 x     TA with controlled knee falllout Red band 3x10 x     sidelying clam shells 3x10 x     sidelying hip abd 3x10 x     Supine HS stretch 30 sec/ x3 x     Supine piriformis stretch 30 sec/ x3 x     prone hip IR/ER x10 could feel in low back w/R but not pain x     prone alt leg lifts x5 B      cat camel    x5 x     prayer stretch   3x10\"   Limited due to bad right shoulder x                                                      Therapeutic Exercise/Home Exercise Program:   x30 min. Instructed in HEP with handout given. Discussed limiting walking time to start at 8 min 2-3 x/day then increase time as tolerated and only on smooth terrain. Reviewed how ex work to increase stability of the spine and pelvis    Group Therapy:    0 minutes    Therapeutic Activity:  0 minutes     Gait: 0 minutes    Neuromuscular Re-Education:  0 minutes      Canalith Repositioning Procedure:  0 minutes    Manual Therapy:  10 minutes  Patient noted to have R anterior pelvic rotation with no sacral torsion    Performed MET, to correct dysfunction with  Neutral alignment achieved. Modalities: 0 minutes   [] GAME READY (VASO)- for significant edema, swelling, pain control. Functional Outcome Measure:   []NA  Measure Used:  oswestry  Date Assessed:  8/17/21  Score: 32%     Assessment/Treatment/Activity Tolerance:    Patients response to treatment:   [x]Patient tolerated treatment well with no adverse reactions noted    []Patient limited by fatigue   []Patient limited by pain    []Patient limited by other medical complications   [x]Other:  Pain decreased to 0/10 after treatment. Goals:   Progress towards goals:  Goals established on 8/17/21  GOALS:  Short Term Goals: 2 weeks  1. Independent in HEP and progression per patient tolerance, in order to prevent re-injury. []? Progressing: []? Met: []? Not Met: []? Adjusted            2.  Patient will have a decrease in pain to facilitate improvement in movement, function, and ADLs as indicated by Functional Deficits. []? Progressing: []? Met: []? Not Met: []? Adjusted               Long Term Goals:8 weeks  1. Disability index score of 20% or less for the oswestry functional questionnaire to assist with reaching prior level of function. []? Progressing: []? Met: []? Not Met: []? Adjusted           2. Patient will demonstrate increased AROM of lumbar and HS to WNL to allow for proper joint functioning as indicated by patients Functional Deficits. []? Progressing: []? Met: []? Not Met: []? Adjusted            3. Patient will demonstrate an increase in strength to 4+/5 or greater throughout B LE's to allow for proper functional mobility as indicated by patients Functional Deficits. []? Progressing: []? Met: []? Not Met: []? Adjusted            4. Patient will return to all transfers, work activities, and functional activities without increased symptoms or restriction. []? Progressing: []? Met: []? Not Met: []? Adjusted            5. Patient will have 0-3/10 pain with ADL's.  []? Progressing: []? Met: []? Not Met: []? Adjusted            6. Patient stated goal: return to pickle ball and yard work without limitations  []? Progressing: []? Met: []? Not Met: []?  Adjusted     Prognosis: [x]Good   []Fair   []Poor    Patient Requires Follow-up:  [x]Yes  []No    Plan: []Plan of care initiated     [x]Continue per plan of care    [] Alter current plan (see comments)    []Hold pending MD visit []Discharge    Charges:  Timed Code Treatment Minutes: 40   Total Treatment Minutes:  40   BWC time for each procedure?:  TE TIME:  NMR TIME:  MANUAL TIME:  UNTIMED MINUTES:       [] EVAL (LOW) 83238 (typically 20 minutes face-to-face)  [] EVAL (MOD) 86451 (typically 30 minutes face-to-face)  [] EVAL (HIGH) 01971 (typically 45 minutes face-to-face)  [] RE-EVAL     [x] RV(19470) x2   [] IONTO  [] NMR (66881) x     [] VASO  [x] Manual (35779) x 1    [] Other:  [] TA x      [] [facility] Traction (11456)  [] ES(attended) (50491)     [] ES (un) (62112): Medicare Cap total YTD:  $326  Electronically signed by:  DOMINIC [id number]      Note: If patient does not return for scheduled/ recommended follow up visits, this note will serve as a discharge from care along with most recent update on progress.

## 2021-08-31 ENCOUNTER — HOSPITAL ENCOUNTER (OUTPATIENT)
Dept: PHYSICAL THERAPY | Age: 76
Setting detail: THERAPIES SERIES
Discharge: HOME | End: 2021-08-31
Payer: MEDICARE

## 2021-08-31 PROCEDURE — 97140 MANUAL THERAPY 1/> REGIONS: CPT

## 2021-08-31 PROCEDURE — 97110 THERAPEUTIC EXERCISES: CPT

## 2021-08-31 NOTE — FLOWSHEET NOTE
[address] [place] [address] [facility] [address]  Phone [phone]  Fax [phone]    Outpatient Physical Therapy     [x] Daily Treatment Note   [] Progress Note   [] Discharge Note    Date:  8/31/2021    Patient Name:  Edel Rader         YOB: 1945    Medical Diagnosis: Acute Left side LBP with left side sciatica (M54.42)  Treatment Diagnosis:   Pelvic/sacral dysfunction, core weakness, leg length difference, decreased functional status     Onset Date: 1 year ago  Referral Date:  8/3/21  Referring Physician:   JANAK Escobedo     Visits Allowed/Insurance/Certification Information:  Medicare/ [employer]      Restrictions/Precautions: osteopenia, DM, HTN      Plan of care sent to provider:   []NA   []Faxed   [x]Co-signature       Plan of care signed:    [x]NA   []Yes   []No      Progress report will be due (10 Rx or 30 days whichever is less): 2/31/92     Recertification will be due (POC Duration  / 90 days whichever is less): 11/17/21     Visit# / total visits:     Visit # Insurance Allowable Auth Required   In Person 4/16 Med nec []  Yes     [x]  No    Tele Health 0  []  Yes     []  No    Total 4/16       Plan for Next Session:   assess alignment, add  open book stretch,     Subjective:  Reports she was really sore Saturday and Sunday center [address]- does not know what caused it. In talking we did figure out she walked about 13' on Wed or Thursday. Kind of feels like someone hit her in the back. Reports she is doing well with the heel lift. Reports she wants to get back to line dancing. Wonders if we should check her endurance on treadmill. Reports she has a therapy ball at home about the size of our green ball.        Pain level: 0-1/10 tender to the touch   AT EVAL:  Patient describes pain to be intermittent in L buttock, radiating down her leg to mid calf region  Patient reports pain is  0/10 pain at present and 5-6/10 pain at its worst.  Worsened by:  Rotation, twisting, vacuuming, forward bending, standing >10 min, walking >15-20 min        Objective:       Exercises:    Exercises in bold performed in department today. Items not bolded are carried forward from prior visits for continuity of the record. Exercise/Equipment Resistance/Repetitions Issued for HEP     TA 6 sec/ x10 x     TA with march Red band 3x10 x     TA with controlled knee falllout Red band 3x10 x     sidelying clam shells 3x10 x     sidelying hip abd 3x10 x     Supine HS stretch 30 sec/ x3 x     Supine piriformis stretch 30 sec/ x3 x     bridge 2x5 x     prone hip IR/ER x10 could feel in low back w/R but not pain x     prone alt leg lifts x10 B little tender but better with compression to ilium (simulation of belt) x     cat camel    x5 x     prayer stretch   3x10\"   Limited due to bad right shoulder x                                                      Therapeutic Exercise/Home Exercise Program:   x35 min. Instructed in HEP with handout given. Group Therapy:    0 minutes    Therapeutic Activity:  0 minutes     Gait: 0 minutes    Neuromuscular Re-Education:  0 minutes      Canalith Repositioning Procedure:  0 minutes    Manual Therapy:  10 minutes  Patient noted to have R anterior pelvic rotation with sacral torsion    Performed MET and prone sacral mobility, to correct dysfunction with  Neutral alignment achieved. Modalities: 0 minutes   [] GAME READY (VASO)- for significant edema, swelling, pain control. Functional Outcome Measure:   []NA  Measure Used:  oswestry  Date Assessed:  8/17/21  Score: 32%     Assessment/Treatment/Activity Tolerance:    Patients response to treatment:   [x]Patient tolerated treatment well with no adverse reactions noted    []Patient limited by fatigue   []Patient limited by pain    []Patient limited by other medical complications   [x]Other:  Pain decreased to 0/10 after treatment.     Goals:   Progress towards goals:  Goals established on 8/17/21  GOALS:  Short Term Goals: 2 weeks  1. Independent in HEP and progression per patient tolerance, in order to prevent re-injury. []? Progressing: []? Met: []? Not Met: []? Adjusted            2. Patient will have a decrease in pain to facilitate improvement in movement, function, and ADLs as indicated by Functional Deficits. []? Progressing: []? Met: []? Not Met: []? Adjusted               Long Term Goals:8 weeks  1. Disability index score of 20% or less for the oswestry functional questionnaire to assist with reaching prior level of function. []? Progressing: []? Met: []? Not Met: []? Adjusted           2. Patient will demonstrate increased AROM of lumbar and HS to WNL to allow for proper joint functioning as indicated by patients Functional Deficits. []? Progressing: []? Met: []? Not Met: []? Adjusted            3. Patient will demonstrate an increase in strength to 4+/5 or greater throughout B LE's to allow for proper functional mobility as indicated by patients Functional Deficits. []? Progressing: []? Met: []? Not Met: []? Adjusted            4. Patient will return to all transfers, work activities, and functional activities without increased symptoms or restriction. []? Progressing: []? Met: []? Not Met: []? Adjusted            5. Patient will have 0-3/10 pain with ADL's.  []? Progressing: []? Met: []? Not Met: []? Adjusted            6. Patient stated goal: return to pickle ball and yard work without limitations  []? Progressing: []? Met: []? Not Met: []?  Adjusted     Prognosis: [x]Good   []Fair   []Poor    Patient Requires Follow-up:  [x]Yes  []No    Plan: []Plan of care initiated     [x]Continue per plan of care    [] Alter current plan (see comments)    []Hold pending MD visit []Discharge    Charges:  Timed Code Treatment Minutes: 45   Total Treatment Minutes:  45   [address] [facility] time for each procedure?:  TE TIME:  NMR TIME:  MANUAL TIME:  UNTIMED MINUTES: [] EVAL (LOW) 95910 (typically 20 minutes face-to-face)  [] EVAL (MOD) 90094 (typically 30 minutes face-to-face)  [] EVAL (HIGH) 28619 (typically 45 minutes face-to-face)  [] RE-EVAL     [x] FF(27366) x2   [] IONTO  [] NMR (57270) x     [] VASO  [x] Manual (16747) x 1    [] Other:  [] TA x      [] Mech Traction (00652)  [] ES(attended) (20503)     [] ES (un) (51200): Medicare Cap total YTD:  $411  Electronically signed by:  Henrry Brooks, [id number]      Note: If patient does not return for scheduled/ recommended follow up visits, this note will serve as a discharge from care along with most recent update on progress.

## 2021-09-02 ENCOUNTER — HOSPITAL ENCOUNTER (OUTPATIENT)
Dept: PHYSICAL THERAPY | Age: 76
Setting detail: THERAPIES SERIES
Discharge: HOME | End: 2021-09-02
Payer: MEDICARE

## 2021-09-02 PROCEDURE — 97140 MANUAL THERAPY 1/> REGIONS: CPT

## 2021-09-02 PROCEDURE — 97110 THERAPEUTIC EXERCISES: CPT

## 2021-09-02 NOTE — FLOWSHEET NOTE
[address] [place] [address] [facility] [address]  Phone [phone]  Fax [phone]    Outpatient Physical Therapy     [x] Daily Treatment Note   [] Progress Note   [] Discharge Note    Date:  9/2/2021    Patient Name:  Luis Enrique Ramirez         YOB: 1945    Medical Diagnosis: Acute Left side LBP with left side sciatica (M54.42)  Treatment Diagnosis:   Pelvic/sacral dysfunction, core weakness, leg length difference, decreased functional status     Onset Date: 1 year ago  Referral Date:  8/3/21  Referring Physician:   JANAK Kendall     Visits Allowed/Insurance/Certification Information:  Medicare/ [employer]      Restrictions/Precautions: osteopenia, DM, HTN      Plan of care sent to provider:   []NA   []Faxed   [x]Co-signature       Plan of care signed:    []NA   [x]Yes 8/17/21   []No      Progress report will be due (10 Rx or 30 days whichever is less): 9/26/88     Recertification will be due (POC Duration  / 90 days whichever is less): 11/17/21     Visit# / total visits:     Visit # Insurance Allowable Auth Required   In Person 5/16 Med nec []  Yes     [x]  No    Tele Health 0  []  Yes     []  No    Total 5/16       Plan for Next Session:   assess alignment and effectiveness of traction, add open book stretch, multifidus handshake, long arm ext rows, mid rows     Subjective:  Patient reports she started having soreness last week, making her low  to the touch. Tuesday after her appt she noticed an increase her in low back pain, radiating down left leg to mid thigh. She was pulling weeds in her garden on Monday, but didn't experience any pain with the activity. She did not do her exercises yesterday due to her increased pain. Her pain is aggravated by standing or walking slow >15 min. Sleeping pretty well at night, some achiness in the morning.       Pain level: 0/10 currently, 5/10 at worst since last visit across her low back, radiating down L leg to mid thigh. AT EVAL:  Patient describes pain to be intermittent in L buttock, radiating down her leg to mid calf region  Patient reports pain is  0/10 pain at present and  5-6/10 pain at its worst.  Worsened by:  Rotation, twisting, vacuuming, forward bending, standing >10 min, walking >15-20 min        Objective:    Leg length:  R 84.0, L 85.0- d/c use of heel lift for now (leg length discrepancy less with correction of pelvis)       Exercises:    Exercises in bold performed in department today. Items not bolded are carried forward from prior visits for continuity of the record. Exercise/Equipment Resistance/Repetitions Issued for HEP     TA 6 sec/ x10 x     TA with march Red band 3x10 x     TA with controlled knee falllout Red band 3x10 x     sidelying clam shells 3x10 x     sidelying hip abd 3x10 x     Supine HS stretch 30 sec/ x3 x     Supine piriformis stretch 30 sec/ x3 x     bridge 2x5 x     prone hip IR/ER x10 could feel in low back w/R but not pain x     prone alt leg lifts x10 B little tender but better with compression to ilium (simulation of belt) x     cat camel    x5 x     prayer stretch   3x10\"   Limited due to bad right shoulder x     Reverse crunch with tball x20      Lateral rolls with tball x20                                         Therapeutic Exercise/Home Exercise Program:   x15 min. Reviewed and progressed exercises as noted above with new handout given. Patient instructed to hold any exercise if it causes pain >5/10. Group Therapy:    0 minutes    Therapeutic Activity:  0 minutes     Gait: 0 minutes    Neuromuscular Re-Education:  0 minutes      Canalith Repositioning Procedure:  0 minutes    Manual Therapy:  30 minutes  Patient noted to have L posterior pelvic rotation with L on R sacral torsion    Performed MET and prone sacral mobility, to correct dysfunction with  Neutral alignment achieved.   Supine lumbopelvic roll stretch, no mobilization  Manual correction of left lateral shift with patient standing  Gentle manual lumbar traction in supine with LE's elevated on stool x10 min  D/c use of heel lift. Issued SI belt to patient with fit and instruction provided. Encouraged patient to wear with standing and walking activity. Patient states it feels good, provides more support. Modalities: 0 minutes   [] GAME READY (VASO)- for significant edema, swelling, pain control. Functional Outcome Measure:   []NA  Measure Used:  oswestry  Date Assessed:  8/17/21  Score: 32%     Assessment/Treatment/Activity Tolerance:    Patients response to treatment:   [x]Patient tolerated treatment well with no adverse reactions noted    []Patient limited by fatigue   []Patient limited by pain    []Patient limited by other medical complications   [x]Other:  Pain decreased to 0/10 after treatment. Goals:   Progress towards goals:  Goals established on 8/17/21  GOALS:  Short Term Goals: 2 weeks  1. Independent in HEP and progression per patient tolerance, in order to prevent re-injury. []? Progressing: []? Met: []? Not Met: []? Adjusted            2. Patient will have a decrease in pain to facilitate improvement in movement, function, and ADLs as indicated by Functional Deficits. []? Progressing: []? Met: []? Not Met: []? Adjusted               Long Term Goals:8 weeks  1. Disability index score of 20% or less for the oswestry functional questionnaire to assist with reaching prior level of function. []? Progressing: []? Met: []? Not Met: []? Adjusted           2. Patient will demonstrate increased AROM of lumbar and HS to WNL to allow for proper joint functioning as indicated by patients Functional Deficits. []? Progressing: []? Met: []? Not Met: []? Adjusted            3. Patient will demonstrate an increase in strength to 4+/5 or greater throughout B LE's to allow for proper functional mobility as indicated by patients Functional Deficits. []? Progressing: []? Met: []? Not Met: []? Adjusted            4. Patient will return to all transfers, work activities, and functional activities without increased symptoms or restriction. []? Progressing: []? Met: []? Not Met: []? Adjusted            5. Patient will have 0-3/10 pain with ADL's.  []? Progressing: []? Met: []? Not Met: []? Adjusted            6. Patient stated goal: return to [employer] ball and yard work without limitations  []? Progressing: []? Met: []? Not Met: []? Adjusted     Prognosis: [x]Good   []Fair   []Poor    Patient Requires Follow-up:  [x]Yes  []No    Plan: []Plan of care initiated     [x]Continue per plan of care    [] Alter current plan (see comments)    []Hold pending MD visit []Discharge    Charges:  Timed Code Treatment Minutes: 45   Total Treatment Minutes:  45   [facility] time for each procedure?:  TE TIME:  NMR TIME:  MANUAL TIME:  UNTIMED MINUTES:       [] EVAL (LOW) 18089 (typically 20 minutes face-to-face)  [] EVAL (MOD) 39975 (typically 30 minutes face-to-face)  [] EVAL (HIGH) 05957 (typically 45 minutes face-to-face)  [] RE-EVAL     [x] KL(97199) x1   [] IONTO  [] NMR (46003) x     [] VASO  [x] Manual (01711) x 2    [] Other:  [] TA x      [] Mech Traction (46764)  [] ES(attended) (41598)     [] ES (un) (94801): Medicare Cap total YTD:  [id number]  Electronically signed by:  Soo Coles, PT, MPT, OMT-C [id number]        Note: If patient does not return for scheduled/ recommended follow up visits, this note will serve as a discharge from care along with most recent update on progress.

## 2021-09-09 ENCOUNTER — APPOINTMENT (OUTPATIENT)
Dept: PHYSICAL THERAPY | Age: 76
End: 2021-09-09
Payer: MEDICARE

## 2021-09-09 ENCOUNTER — TELEPHONE (OUTPATIENT)
Dept: FAMILY MEDICINE CLINIC | Age: 76
End: 2021-09-09

## 2021-09-09 DIAGNOSIS — M54.41 CHRONIC RIGHT-SIDED LOW BACK PAIN WITH RIGHT-SIDED SCIATICA: Primary | ICD-10-CM

## 2021-09-09 DIAGNOSIS — G89.29 CHRONIC RIGHT-SIDED LOW BACK PAIN WITH RIGHT-SIDED SCIATICA: Primary | ICD-10-CM

## 2021-09-14 ENCOUNTER — HOSPITAL ENCOUNTER (OUTPATIENT)
Dept: PHYSICAL THERAPY | Age: 76
Setting detail: THERAPIES SERIES
Discharge: HOME | End: 2021-09-14
Payer: MEDICARE

## 2021-09-14 PROCEDURE — 97110 THERAPEUTIC EXERCISES: CPT

## 2021-09-14 NOTE — FLOWSHEET NOTE
[address] [facility] and Sports Rehabilitation, [address]  Phone [phone]  Fax [phone]    Outpatient Physical Therapy     [x] Daily Treatment Note   [x] Progress Note 9/14/21   [] Discharge Note    Date:  9/14/2021    Patient Name:  Paula Graham         YOB: 1945    Medical Diagnosis: Acute Left side LBP with left side sciatica (M54.42)  Treatment Diagnosis:   Pelvic/sacral dysfunction, core weakness, leg length difference, decreased functional status     Onset Date: 1 year ago  Referral Date:  8/3/21  Referring Physician:   JANAK Burnett     Visits Allowed/Insurance/Certification Information:  Medicare/ [employer]      Restrictions/Precautions: osteopenia, DM, HTN      Plan of care sent to provider:   []NA   []Faxed   [x]Co-signature       Plan of care signed:    []NA   [x]Yes 8/17/21   []No      Progress report will be due (10 Rx or 30 days whichever is less): 15/23/41     Recertification will be due (POC Duration  / 90 days whichever is less): 11/17/21     Visit# / total visits:     Visit # Insurance Allowable Auth Required   In Person 6/16 Med nec []  Yes     [x]  No    Tele Health 0  []  Yes     []  No    Total 6/16       Plan for Next Session:   add open book stretch, multifidus handshake, long arm ext rows, mid rows, lumbar rotation in standing with fixed tband     Subjective:  Patient got a massage last week on Wednesday, and she feels much better. She has another massage scheduled for today. She feels this has helped her pain. She did yard work on Friday, with some pain noted while she was working, but it went away when she rested. Her pain is now a dull achiness, and only radiating into L buttock, which usually only occurs with prolonged standing. She is sleeping through the night without waking due to pain. Pain occurs with standing >15-20 min, no limitations noted with walking.   No longer having pain with twisting, forward bending, and vacuuming. Performing HEP 3 days/week. She feels she has made 80% improvement with PT treatment. Pain level: 0/10 currently, 2-3/10 at worst since last visit across her low back, radiating into L buttock. AT EVAL:  Patient describes pain to be intermittent in L buttock, radiating down her leg to mid calf region  Patient reports pain is  0/10 pain at present and  5-6/10 pain at its worst.  Worsened by:  Rotation, twisting, vacuuming, forward bending, standing >10 min, walking >15-20 min        Objective:    AROM:  Lumbar flex 95, ext 25, SB L 30, R 22, Rot 65 B, supine HS 90/90 -30 L, -15 R  MMT:  5/5 B hip flex, 4/5 B hip ext, 4/5 B hip abd and add, 5/5 with B knee flex, ext, and ankle DF  Leg length:  R 84.0, L 85.0- d/c use of heel lift for now (leg length discrepancy less with correction of pelvis)       Exercises:    Exercises in bold performed in department today. Items not bolded are carried forward from prior visits for continuity of the record. Exercise/Equipment Resistance/Repetitions Issued for HEP     TA 6 sec/ x10 x     TA with march Red band 3x10 x     TA with controlled knee falllout Red band 3x10 x     sidelying clam shells 3x10 x     sidelying hip abd 3x10 x     Supine HS stretch 30 sec/ x3 x     Supine piriformis stretch 30 sec/ x3 x     bridge 2x5 x     prone hip IR/ER x10 could feel in low back w/R but not pain x     prone alt leg lifts x10 B little tender but better with compression to ilium (simulation of belt) x     cat camel    x5 x     prayer stretch   3x10\"   Limited due to bad right shoulder x     Reverse crunch with tball x20      Lateral rolls with tball x20      Bird dog in quadruped  x10 B      Donkey kicks x10 B     fire hydrant  x10 B                    Therapeutic Exercise/Home Exercise Program:   x45 min. Reviewed and progressed exercises as noted above with new handout given.   Exercises can be done in quadruped or plantigrade depending on arthritic pain in wrists. Educated patient about the importance of compliance with HEP, 30 min/day, 5-7 days/week. Assessment done for progress note. Group Therapy:    0 minutes    Therapeutic Activity:  0 minutes     Gait: 0 minutes    Neuromuscular Re-Education:  0 minutes      Canalith Repositioning Procedure:  0 minutes    Manual Therapy:  0 minutes- deferred due to no pain noted today. Modalities: 0 minutes   [] GAME READY (VASO)- for significant edema, swelling, pain control. Functional Outcome Measure:   []NA  Measure Used:  oswestry  Date Assessed:  8/17/21  Score: 32%     Assessment/Treatment/Activity Tolerance:  Patient making good progress with PT treatment. Improvement noted in core and LE strength, pain, and return to function. No significant improvement noted with lumbar ROM, but no significant limitations were noted at initial eval.  Recommend continuation of PT treat 1-2x/week for another 2-4 weeks pending progress towards remaining goals. Patients response to treatment:   [x]Patient tolerated treatment well with no adverse reactions noted    []Patient limited by fatigue   []Patient limited by pain    []Patient limited by other medical complications   [x]Other:  Pain remained  0/10 after treatment. Goals:   Progress towards goals:  STG's MET. LTG  #4,5 MET. Progressing towards remaining goals. GOALS:  Short Term Goals: 2 weeks  1. Independent in HEP and progression per patient tolerance, in order to prevent re-injury. []? Progressing: [x]? Met: []? Not Met: []? Adjusted            2. Patient will have a decrease in pain to facilitate improvement in movement, function, and ADLs as indicated by Functional Deficits. []? Progressing: [x]? Met: []? Not Met: []? Adjusted               Long Term Goals:8 weeks  1. Disability index score of 20% or less for the oswestry functional questionnaire to assist with reaching prior level of function. [x]?  Progressing: []? most recent update on progress.

## 2021-09-21 ENCOUNTER — APPOINTMENT (OUTPATIENT)
Dept: PHYSICAL THERAPY | Age: 76
End: 2021-09-21
Payer: MEDICARE

## 2021-09-23 ENCOUNTER — HOSPITAL ENCOUNTER (OUTPATIENT)
Dept: PHYSICAL THERAPY | Age: 76
Setting detail: THERAPIES SERIES
Discharge: HOME | End: 2021-09-23
Payer: MEDICARE

## 2021-09-23 PROCEDURE — 97110 THERAPEUTIC EXERCISES: CPT

## 2021-09-23 NOTE — FLOWSHEET NOTE
[address] [facility] and [facility] Rehabilitation, [address]  Phone [phone]  Fax [phone]    Outpatient Physical Therapy     [x] Daily Treatment Note   [] Progress Note 9/14/21   [] Discharge Note    Date:  9/23/2021    Patient Name:  Jose M Ramon         YOB: 1945    Medical Diagnosis: Acute Left side LBP with left side sciatica (M54.42)  Treatment Diagnosis:   Pelvic/sacral dysfunction, core weakness, leg length difference, decreased functional status     Onset Date: 1 year ago  Referral Date:  8/3/21  Referring Physician:   JANAK Berry     Visits Allowed/Insurance/Certification Information:  Medicare/ [employer]      Restrictions/Precautions: osteopenia, DM, HTN      Plan of care sent to provider:   []NA   []Faxed   [x]Co-signature       Plan of care signed:    []NA   [x]Yes 8/17/21   []No      Progress report will be due (10 Rx or 30 days whichever is less): 96/37/43     Recertification will be due (POC Duration  / 90 days whichever is less): 11/17/21     Visit# / total visits:     Visit # Insurance Allowable Auth Required   In Person 7/16 Med nec []  Yes     [x]  No    Tele Health 0  []  Yes     []  No    Total 7/16       Plan for Next Session:   add lumbar rotation in standing with fixed tband     Subjective:  Reports she has had a couple of massages in the last couple of wks and thinks that has helped too. Reports she has been doing fair with the ex with some difficulty    Pain level: 0/10 currently, 1-2/10 at worst since last visit across her low back, radiating into L buttock.   AT EVAL:  Patient describes pain to be intermittent in L buttock, radiating down her leg to mid calf region  Patient reports pain is  0/10 pain at present and  5-6/10 pain at its worst.  Worsened by:  Rotation, twisting, vacuuming, forward bending, standing >10 min, walking >15-20 min        Objective:           Exercises:    Exercises in bold performed in department today. Items not bolded are carried forward from prior visits for continuity of the record. Exercise/Equipment Resistance/Repetitions Issued for HEP     TA 6 sec/ x10 x     TA with march Red band 3x10 x     TA with controlled knee falllout Red band 3x10 x     sidelying clam shells 3x10 x     sidelying hip abd 3x10 x     Supine HS stretch 30 sec/ x3 x     Supine piriformis stretch 30 sec/ x3 x     bridge 2x5 x     prone hip IR/ER x10 could feel in low back w/R but not pain x     prone alt leg lifts x10 B little tender but better with compression to ilium (simulation of belt) x     cat camel    x5 x     prayer stretch   3x10\"   Limited due to bad right shoulder x     Reverse crunch with tball x20      Lateral rolls with tball x20      Bird dog in quadruped  2x10 B  W/wash cloth  due to pain in hand/wrists x     [employer] plantigrade 2x10 B x     fire hydrant plantigrade 2x10 B x     open book stretch   x5 B x     multifidus handshake 3x10 B x     long arm ext rows Red  2x10 x     mid rows Red  2x10 x                        Therapeutic Exercise/Home Exercise Program:   x23 min. Reviewed and progressed exercises as noted above with new handout given. Exercises can be done in quadruped or plantigrade depending on arthritic pain in wrists. Group Therapy:    0 minutes    Therapeutic Activity:  0 minutes     Gait: 0 minutes    Neuromuscular Re-Education:  0 minutes      Canalith Repositioning Procedure:  0 minutes    Manual Therapy:  0 minutes- deferred due to no pain noted today. Modalities: 0 minutes   [] GAME READY (VASO)- for significant edema, swelling, pain control. Functional Outcome Measure:   []NA  Measure Used:  oswestry  Date Assessed:  8/17/21  Score: 32%     Assessment/Treatment/Activity Tolerance:  9/14/21Patient making good progress with PT treatment. Improvement noted in core and LE strength, pain, and return to function.   No significant improvement noted with lumbar ROM, but no significant limitations were noted at initial eval.  Recommend continuation of PT treat 1-2x/week for another 2-4 weeks pending progress towards remaining goals. Patients response to treatment:   [x]Patient tolerated treatment well with no adverse reactions noted    []Patient limited by fatigue   []Patient limited by pain    []Patient limited by other medical complications   [x]Other:  Pain remained  0/10 after treatment. Goals:   Progress towards goals:  STG's MET. LTG  #4,5 MET. Progressing towards remaining goals. GOALS:  Short Term Goals: 2 weeks  1. Independent in HEP and progression per patient tolerance, in order to prevent re-injury. []? Progressing: [x]? Met: []? Not Met: []? Adjusted            2. Patient will have a decrease in pain to facilitate improvement in movement, function, and ADLs as indicated by Functional Deficits. []? Progressing: [x]? Met: []? Not Met: []? Adjusted               Long Term Goals:8 weeks  1. Disability index score of 20% or less for the oswestry functional questionnaire to assist with reaching prior level of function. [x]? Progressing: []? Met: []? Not Met: []? Adjusted           2. Patient will demonstrate increased AROM of lumbar and HS to WNL to allow for proper joint functioning as indicated by patients Functional Deficits. [x]? Progressing: []? Met: []? Not Met: []? Adjusted            3. Patient will demonstrate an increase in strength to 4+/5 or greater throughout B LE's to allow for proper functional mobility as indicated by patients Functional Deficits. [x]? Progressing: []? Met: []? Not Met: []? Adjusted            4. Patient will return to all transfers, work activities, and functional activities without increased symptoms or restriction. []? Progressing: [x]? Met: []? Not Met: []? Adjusted            5. Patient will have 0-3/10 pain with ADL's.  []? Progressing: [x]? Met: []? Not Met: []?  Adjusted 6. Patient stated goal: return to pickle ball and yard work without limitations  [x]? Progressing: []? Met: []? Not Met: []? Adjusted     Prognosis: [x]Good   []Fair   []Poor    Patient Requires Follow-up:  [x]Yes  []No    Plan: []Plan of care initiated     [x]Continue per plan of care    [] Alter current plan (see comments)    []Hold pending MD visit []Discharge    Charges:  Timed Code Treatment Minutes: 23   Total Treatment Minutes:  23   [facility] time for each procedure?:  TE TIME:  NMR TIME:  MANUAL TIME:  UNTIMED MINUTES:       [] EVAL (LOW) 75791 (typically 20 minutes face-to-face)  [] EVAL (MOD) 88583 (typically 30 minutes face-to-face)  [] EVAL (HIGH) 89884 (typically 45 minutes face-to-face)  [] RE-EVAL     [x] TU(00642) x2  [] IONTO  [] NMR (39259) x     [] VASO  [] Manual (27214) x     [] Other:  [] TA x      [] Mech Traction (03132)  [] ES(attended) (58753)     [] ES (un) (66938): Medicare Cap total YTD:  [id number]  Electronically signed by:  Eugenia Villalba [id number]        Note: If patient does not return for scheduled/ recommended follow up visits, this note will serve as a discharge from care along with most recent update on progress.

## 2021-09-28 ENCOUNTER — HOSPITAL ENCOUNTER (OUTPATIENT)
Dept: PHYSICAL THERAPY | Age: 76
Setting detail: THERAPIES SERIES
Discharge: HOME | End: 2021-09-28
Payer: MEDICARE

## 2021-09-28 PROCEDURE — 97110 THERAPEUTIC EXERCISES: CPT

## 2021-09-28 NOTE — FLOWSHEET NOTE
0 [facility], [address]  Phone [phone]  Fax [phone]    Outpatient Physical Therapy     [x] Daily Treatment Note   [] Progress Note 9/14/21   [] Discharge Note    Date:  9/28/2021    Patient Name:  Janeth Ruffin         YOB: 1945    Medical Diagnosis: Acute Left side LBP with left side sciatica (M54.42)  Treatment Diagnosis:   Pelvic/sacral dysfunction, core weakness, leg length difference, decreased functional status     Onset Date: 1 year ago  Referral Date:  8/3/21  Referring Physician:   JANAK Escobar     Visits Allowed/Insurance/Certification Information:  Medicare/ Operation Supply Drop      Restrictions/Precautions: osteopenia, DM, HTN      Plan of care sent to provider:   []NA   []Faxed   [x]Co-signature       Plan of care signed:    []NA   [x]Yes 8/17/21   []No      Progress report will be due (10 Rx or 30 days whichever is less): 84/01/05     Recertification will be due (POC Duration  / 90 days whichever is less): 11/17/21     Visit# / total visits:     Visit # Insurance Allowable Auth Required   In Person 8/16 Med nec []  Yes     [x]  No    Tele Health 0  []  Yes     []  No    Total 8/16       Plan for Next Session:        Subjective:  Reports she is doing more, has some pain here and there and one or two times in the last wk it went into her left leg and into lateral foot with cramps sometimes  Pain level: 0/10 currently, 2/10 at worst since last visit across her low back, radiating into L buttock. AT EVAL:  Patient describes pain to be intermittent in L buttock, radiating down her leg to mid calf region  Patient reports pain is  0/10 pain at present and  5-6/10 pain at its worst.  Worsened by:  Rotation, twisting, vacuuming, forward bending, standing >10 min, walking >15-20 min        Objective:           Exercises:    Exercises in bold performed in department today.   Items not bolded are carried forward from prior visits for continuity of the record. Exercise/Equipment Resistance/Repetitions Issued for HEP     TA 6 sec/ x10 x     TA with march Red band 3x10 x     TA with controlled knee falllout Red band 3x10 x     sidelying clam shells 3x10 x     sidelying hip abd 3x10 x     Supine HS stretch 30 sec/ x3 x     Supine piriformis stretch 30 sec/ x3 x     bridge 2x5 x     prone hip IR/ER x10 could feel in low back w/R but not pain x     prone alt leg lifts x10 B little tender but better with compression to ilium (simulation of belt) x     cat camel    x5 x     prayer stretch   3x10\"   Limited due to bad right shoulder x     Reverse crunch with tball x20      Lateral rolls with tball x20      Bird dog in quadruped  2x10 B  W/wash cloth  due to pain in hand/wrists x     [employer] plantigrade 2x10 B x     fire hydrant plantigrade 2x10 B x     open book stretch   x5 B discomfort L - inst not to rotate as far x     multifidus handshake 3x10 B x     long arm ext rows Red  2x10 x     mid rows Red  2x10 x   lumbar rotation in standing with fixed tband Red  1x10  Limited rom and arm ht due to shoulder issue x                   Therapeutic Exercise/Home Exercise Program:   x30 min. Reviewed and progressed exercises as noted above with new handout given. Exercises can be done in quadruped or plantigrade depending on arthritic pain in wrists. Group Therapy:    0 minutes    Therapeutic Activity:  0 minutes     Gait: 0 minutes    Neuromuscular Re-Education:  0 minutes      Canalith Repositioning Procedure:  0 minutes    Manual Therapy:  0 minutes- deferred due to no pain noted today. Modalities: 0 minutes   [] GAME READY (VASO)- for significant edema, swelling, pain control. Functional Outcome Measure:   []NA  Measure Used:  oswestry  Date Assessed:  8/17/21  Score: 32%     Assessment/Treatment/Activity Tolerance:  9/14/21Patient making good progress with PT treatment.   Improvement noted in core and LE strength, pain, and return to function. No significant improvement noted with lumbar ROM, but no significant limitations were noted at initial eval.  Recommend continuation of PT treat 1-2x/week for another 2-4 weeks pending progress towards remaining goals. Patients response to treatment:   [x]Patient tolerated treatment well with no adverse reactions noted    []Patient limited by fatigue   []Patient limited by pain    []Patient limited by other medical complications   [x]Other:  Pain remained  0/10 after treatment. Goals:   Progress towards goals:  STG's MET. LTG  #4,5 MET. Progressing towards remaining goals. GOALS:  Short Term Goals: 2 weeks  1. Independent in HEP and progression per patient tolerance, in order to prevent re-injury. []? Progressing: [x]? Met: []? Not Met: []? Adjusted            2. Patient will have a decrease in pain to facilitate improvement in movement, function, and ADLs as indicated by Functional Deficits. []? Progressing: [x]? Met: []? Not Met: []? Adjusted               Long Term Goals:8 weeks  1. Disability index score of 20% or less for the oswestry functional questionnaire to assist with reaching prior level of function. [x]? Progressing: []? Met: []? Not Met: []? Adjusted           2. Patient will demonstrate increased AROM of lumbar and HS to WNL to allow for proper joint functioning as indicated by patients Functional Deficits. [x]? Progressing: []? Met: []? Not Met: []? Adjusted            3. Patient will demonstrate an increase in strength to 4+/5 or greater throughout B LE's to allow for proper functional mobility as indicated by patients Functional Deficits. [x]? Progressing: []? Met: []? Not Met: []? Adjusted            4. Patient will return to all transfers, work activities, and functional activities without increased symptoms or restriction. []? Progressing: [x]? Met: []? Not Met: []? Adjusted            5.  Patient will have 0-3/10 pain with ADL's.  []? Progressing: [x]? Met: []? Not Met: []? Adjusted            6. Patient stated goal: return to pickle ball and yard work without limitations  [x]? Progressing: []? Met: []? Not Met: []? Adjusted     Prognosis: [x]Good   []Fair   []Poor    Patient Requires Follow-up:  [x]Yes  []No    Plan: []Plan of care initiated     [x]Continue per plan of care    [] Alter current plan (see comments)    []Hold pending MD visit []Discharge    Charges:  Timed Code Treatment Minutes: 30   Total Treatment Minutes:  30   BWC time for each procedure?:  TE TIME:  NMR TIME:  MANUAL TIME:  UNTIMED MINUTES:       [] EVAL (LOW) 89667 (typically 20 minutes face-to-face)  [] EVAL (MOD) 90173 (typically 30 minutes face-to-face)  [] EVAL (HIGH) 91980 (typically 45 minutes face-to-face)  [] RE-EVAL     [x] MB(84240) x2  [] IONTO  [] NMR (24686) x     [] VASO  [] Manual (35451) x     [] Other:  [] TA x      [] Mech Traction (12675)  [] ES(attended) (25630)     [] ES (un) (53772): Medicare Cap total YTD:  [id number]  Electronically signed by:  Sandeep Emerson, [id number]        Note: If patient does not return for scheduled/ recommended follow up visits, this note will serve as a discharge from care along with most recent update on progress.

## 2021-09-30 ENCOUNTER — HOSPITAL ENCOUNTER (OUTPATIENT)
Dept: PHYSICAL THERAPY | Age: 76
Setting detail: THERAPIES SERIES
Discharge: HOME | End: 2021-09-30
Payer: MEDICARE

## 2021-09-30 PROCEDURE — 97110 THERAPEUTIC EXERCISES: CPT

## 2021-09-30 RX ORDER — PRAVASTATIN SODIUM 20 MG
20 TABLET ORAL DAILY
Qty: 90 TABLET | Refills: 1 | Status: SHIPPED | OUTPATIENT
Start: 2021-09-30 | End: 2021-10-14 | Stop reason: ALTCHOICE

## 2021-09-30 NOTE — FLOWSHEET NOTE
buttock. AT EVAL:  Patient describes pain to be intermittent in L buttock, radiating down her leg to mid calf region  Patient reports pain is  0/10 pain at present and  5-6/10 pain at its worst.  Worsened by:  Rotation, twisting, vacuuming, forward bending, standing >10 min, walking >15-20 min        Objective:    AROM:  Lumbar flex 120, ext 30, SB L 30, R 25, Rot 80 B, supine HS 90/90 -20 L, -15 R  MMT:  5/5 B hip flex, 4+/5 B hip ext, 4+/5 B hip abd and add, 5/5 with B knee flex, ext, and ankle DF  Palpation:  Slight tenderness in L lumbar paraspinals. Neutral alignment of pelvis and sacrum. Gait:  WNL        Exercises:    Exercises in bold performed in department today. Items not bolded are carried forward from prior visits for continuity of the record. Exercise/Equipment Resistance/Repetitions Issued for HEP     TA 6 sec/ x10 x     TA with march Red band 3x10 x     TA with controlled knee falllout Red band 3x10 x     sidelying clam shells 3x10 x     sidelying hip abd 3x10 x     Supine HS stretch 30 sec/ x3 x     Supine piriformis stretch 30 sec/ x3 x     bridge 2x5 x     prone hip IR/ER x10 could feel in low back w/R but not pain x     prone alt leg lifts x10 B little tender but better with compression to ilium (simulation of belt) x     cat camel    x5 x     prayer stretch   3x10\"   Limited due to bad right shoulder x     Reverse crunch with tball x20      Lateral rolls with tball x20      Bird dog in quadruped  2x10 B  W/wash cloth  due to pain in hand/wrists x     [employer] plantigrade 2x10 B x     fire hydrant plantigrade 2x10 B x     open book stretch   x5 B discomfort L - inst not to rotate as far x     multifidus handshake 3x10 B x     long arm ext rows Red  2x10 x     mid rows Red  2x10 x   lumbar rotation in standing with fixed tband Red  1x10  Limited rom and arm ht due to shoulder issue x                   Therapeutic Exercise/Home Exercise Program:   x40 min.    Reviewed exercises for HEP.  Patient demonstrates good understanding and good compliance. Exercises can be done in quadruped or plantigrade depending on arthritic pain in wrists. Body mechanics instructions provided with handout given. Assessment done for d/c note. Group Therapy:    0 minutes    Therapeutic Activity:  0 minutes     Gait: 0 minutes    Neuromuscular Re-Education:  0 minutes      Canalith Repositioning Procedure:  0 minutes    Manual Therapy:  0 minutes- deferred due to no pain noted today. Modalities: 0 minutes   [] GAME READY (VASO)- for significant edema, swelling, pain control. Functional Outcome Measure:   []NA  Measure Used:  oswestry  Date Assessed:  8/17/21 9/30/21  Score: 32%     12%    Assessment/Treatment/Activity Tolerance:  9/30/21 Patient made good progress with PT treatment. Improvement noted in core and LE strength, pain, ROM/flexibility, and return to function. Patient should continue with HEP independently. All goals met. NO further PT treatment needed at this time. D/c from PT. Patients response to treatment:   [x]Patient tolerated treatment well with no adverse reactions noted    []Patient limited by fatigue   []Patient limited by pain    []Patient limited by other medical complications   [x]Other:  Pain remained  0/10 after treatment. Goals:   Progress towards goals: All goals MET. GOALS:  Short Term Goals: 2 weeks  1. Independent in HEP and progression per patient tolerance, in order to prevent re-injury. []? Progressing: [x]? Met: []? Not Met: []? Adjusted            2. Patient will have a decrease in pain to facilitate improvement in movement, function, and ADLs as indicated by Functional Deficits. []? Progressing: [x]? Met: []? Not Met: []? Adjusted               Long Term Goals:8 weeks  1. Disability index score of 20% or less for the oswestry functional questionnaire to assist with reaching prior level of function. []? Progressing: [x]?  Met: []? Not Met: []? Adjusted           2. Patient will demonstrate increased AROM of lumbar and HS to WNL to allow for proper joint functioning as indicated by patients Functional Deficits. []? Progressing: [x]? Met: []? Not Met: []? Adjusted            3. Patient will demonstrate an increase in strength to 4+/5 or greater throughout B LE's to allow for proper functional mobility as indicated by patients Functional Deficits. []? Progressing: [x]? Met: []? Not Met: []? Adjusted            4. Patient will return to all transfers, work activities, and functional activities without increased symptoms or restriction. []? Progressing: [x]? Met: []? Not Met: []? Adjusted            5. Patient will have 0-3/10 pain with ADL's.  []? Progressing: [x]? Met: []? Not Met: []? Adjusted            6. Patient stated goal: return to pickle ball and yard work without limitations  []? Progressing: [x]? Met: []? Not Met: []? Adjusted     Prognosis: [x]Good   []Fair   []Poor    Patient Requires Follow-up:  []Yes  [x]No    Plan: []Plan of care initiated     []Continue per plan of care    [] Alter current plan (see comments)    []Hold pending MD visit [x]Discharge    Charges:  Timed Code Treatment Minutes: 40   Total Treatment Minutes:  40   BWC time for each procedure?:  TE TIME:  NMR TIME:  MANUAL TIME:  UNTIMED MINUTES:       [] EVAL (LOW) 96336 (typically 20 minutes face-to-face)  [] EVAL (MOD) 05296 (typically 30 minutes face-to-face)  [] EVAL (HIGH) 06973 (typically 45 minutes face-to-face)  [] RE-EVAL     [x] UQ(21529) x3  [] IONTO  [] NMR (33646) x     [] VASO  [] Manual (97264) x     [] Other:  [] TA x      [] Mech Traction (56477)  [] ES(attended) (46238)     [] ES (un) (53200):     Medicare Cap total YTD:  $[id number]  Electronically signed by:  Francesca Merrill, PT, MPT, OMT-C [id number]          Note: If patient does not return for scheduled/ recommended follow up visits, this note will serve as a discharge from care along with most recent update on progress.

## 2021-10-13 NOTE — PROGRESS NOTES
[facility]   Cardiac Consultation    Referring Provider:  GIULIANO Lucio CNP     Chief Complaint   Patient presents with    Follow-up    Hypertension      Lashell Arenas   1945    History of Present Illness:   Lashell Arenas is a 78 y.o. female who is here today for follow up for a past medical history of past medical history of chest pain, diabetes mellitus, asthma, hypertension, and hyperlipidemia. She presented to [facility] in [place] on 3/31/2021 with complaints of chest pain. She was admitted for further work up to rule out ACS. Chest Xray and Troponin's negative. Carotid dopplers with 50-69% stenosis in right and left carotid artery. Started on statin therapy. EKG showed sinus bradycardia. An echocardiogram on 4/1/2021 showed an EF of 60% with mild mitral and tricuspid regurgitation. At her last visit she reported having SOB, chest pain and all over body tingling while riding her bile to get the mail in [place]. She wore a cardiac event monitor from 4/12-4/18/2021 which showed an average heart rate of 67 (), 1.6% PVC's, 0.95% PAC's. Stress test 4/12/22021 showed no ischemia or scar. She was started on Valsartan 160 mg daily for better blood pressure control. Pravastatin changed to Lipitor and Asprin 81 mg recommended. Today she states she has been feeling well since her last visit. She states she has had no further chest pain, palpitations or SOB. She is tolerating her medications and taking them as prescribed. She has noted a recent cough that she thinks is related to allergies, no fevers. She has not started Aspirin due to concerns with taking meloxicam for her arthritis. She has a history of stomach ulcers. She checks her blood pressure occasionally at home and it runs 120/130's. Patient currently denies any weight gain, edema, palpitations, chest pain, shortness of breath, dizziness, and syncope.     Past Medical History:   has a past medical history of Chest pain NEC, Depression, Hyperlipemia, and Routine health maintenance. Surgical History:   has a past surgical history that includes Rotator cuff repair (2002, 3/2011); Appendectomy; Dilation and curettage of uterus; Hemorrhoid surgery; pipo and bso (cervix removed) (07/2016); Colonoscopy (08/03/2017); hernia repair (Right, 10/17/2017); epidural steroid injection (Right, 6/24/2019); and epidural steroid injection (Right, 12/3/2019). Social History:   reports that she has never smoked. She has never used smokeless tobacco. She reports current alcohol use. She reports that she does not use drugs. Family History:  family history includes Arthritis in her sister; Diabetes in her father. Home Medications:  Prior to Admission medications    Medication Sig Start Date End Date Taking? Authorizing Provider   pravastatin (PRAVACHOL) 20 MG tablet Take 1 tablet by mouth daily 9/30/21  Yes Theya Ruggiero APRDEEPALI - CNP   chlorthalidone (HYGROTON) 25 MG tablet TAKE 1 TABLET BY MOUTH  DAILY 7/30/21  Yes Theone Monik APRDEEPALI - CNP   metFORMIN (GLUCOPHAGE-XR) 500 MG extended release tablet TAKE 1 TABLET BY MOUTH  TWICE DAILY 7/30/21  Yes Theone Ing APRN - CNP   valsartan (DIOVAN) 160 MG tablet Take 1 tablet by mouth daily 6/15/21  Yes Quintin Lerner MD   montelukast (SINGULAIR) 10 MG tablet TAKE 1 TABLET BY MOUTH AT  NIGHT 12/10/20  Yes Theone Monik APRDEEPALI - CNP   meloxicam (MOBIC) 15 MG tablet TAKE 1 TABLET BY MOUTH  DAILY 12/10/20  Yes Theone Ing APRN - KRUNAL   SYMBICORT 160-4.5 MCG/ACT AERO USE 2 INHALATIONS BY MOUTH  TWICE DAILY 12/7/20  Yes Theone Monik APRN - CNP   albuterol sulfate HFA (PROAIR HFA) 108 (90 Base) MCG/ACT inhaler Inhale 1-2 puffs into the lungs every 4 hours as needed for Wheezing or Shortness of Breath 6/7/18  Yes Jb Perales MD   Acetaminophen (TYLENOL PO) Take  by mouth. Yes Historical Provider, MD   fluticasone (FLONASE) 50 MCG/ACT nasal spray 1-2 sprays by Nasal route nightly.  11/4/13  Yes Tod Miller MD   NAPROXEN PO Take by mouth    Historical Provider, MD   Misc. Devices (ALL-BODY MASSAGE) MISC As directed 4/29/21   GIULIANO Reyna - CNP   [employer] Lancets Ultra Thin MISC 1 each by Does not apply route daily 9/25/19   Tod Miller MD   blood glucose monitor strips Test 1 times a day 9/25/19   Tod Miller MD        Allergies:  Flagyl [metronidazole], Daypro [oxaprozin], Sulfa antibiotics, Tramadol, Amlodipine, and Lisinopril     Review of Systems:   · Constitutional: there has been no unanticipated weight loss. There's been no change in energy level, sleep pattern, or activity level. · Eyes: No visual changes or diplopia. No scleral icterus. · ENT: No Headaches, hearing loss or vertigo. No mouth sores or sore throat. · Cardiovascular: Reviewed in HPI  · Respiratory: No cough or wheezing, no sputum production. No hematemesis. · Gastrointestinal: No abdominal pain, appetite loss, blood in stools. No change in bowel or bladder habits. · Genitourinary: No dysuria, trouble voiding, or hematuria. · Musculoskeletal:  No gait disturbance, weakness or joint complaints. · Integumentary: No rash or pruritis. · Neurological: No headache, diplopia, change in muscle strength, numbness or tingling. No change in gait, balance, coordination, mood, affect, memory, mentation, behavior. · Psychiatric: No anxiety, no depression. · Endocrine: No malaise, fatigue or temperature intolerance. No excessive thirst, fluid intake, or urination. No tremor. · Hematologic/Lymphatic: No abnormal bruising or bleeding, blood clots or swollen lymph nodes. · Allergic/Immunologic: No nasal congestion or hives.     Physical Examination:    Vitals:    10/14/21 0949   BP: 118/74   Pulse: 67   SpO2: 98%        Constitutional and General Appearance: NAD   Respiratory:  · Normal excursion and expansion without use of accessory muscles  · Resp Auscultation: Normal breath sounds without dullness  Cardiovascular:  · The apical impulses not displaced  · Heart tones are crisp and normal  · Cervical veins are not engorged  · The carotid upstroke is normal in amplitude and contour without delay or bruit  · Normal S1S2, No S3, No Murmur  · Peripheral pulses are symmetrical and full  · There is no clubbing, cyanosis of the extremities. · No edema  · Femoral Arteries: 2+ and equal  · Pedal Pulses: 2+ and equal   Abdomen:  · No masses or tenderness  · Liver/Spleen: No Abnormalities Noted  Neurological/Psychiatric:  · Alert and oriented in all spheres  · Moves all extremities well  · Exhibits normal gait balance and coordination  · No abnormalities of mood, affect, memory, mentation, or behavior are noted    Stress test 2018  Summary  There is normal isotope uptake at stress and rest. There is no evidence of  myocardial ischemia or scar. Hyperdynamic LV systolic function with SQ>17%  with uniform wall motion. Low risk study. EKG 4/1/2021      Echocardiogram 4/1/2021 [facility] in [place]       Carotid dopplers 4/1/2021 [facility] in [address] test 4/12/2021  Conclusions    Summary  There is normal isotope uptake at stress and rest. There is no evidence of  myocardial ischemia or scar. Hyperdynamic LV systolic function with UF>21%  with uniform wall motion. Low risk study. Cardiac event monitor 4/12-4/18/2021   Average heart rate 67 (), 1.6% PVC's, 0.85% PAC's         Assessment:   Chest pain - typical/atypical features. No recurrence since last OV  SOB - not cardiac based on testing.  decreased   Palpitations due to PVCs, PAVS. Decreased   Asthma   Hypertension - improved  Hyperlipidemia - improved  Diabetes   Asthma   Both sisters with a history of atrial fib   Carotid stenosis   History of stomach Ulcers     Plan:  Plan to repeat carotid dopplers April 2022  Cardiac medications reviewed including indications and pertinent side effects- Lipitor (atovastatin) 20 mg daily   EcAspirin 81 mg daily- R/B/A/E discussed . Check blood pressure and heart rate at home a few times per week- keep a log with dates and times and bring to office visit   Call if you continue to have a cough   Regular exercise and following a healthy diet encouraged   Follow up with me when you primo back from [place]     The scribes documentation has been prepared under my direction and personally reviewed by me in its entirety. I confirm that the note above accurately reflects all work, treatment, procedures, and medical decision making performed by me. Dr. Jordon Schaeffer MD        Scribe's attestation: This note was scribed in the presence of Dr. Jordon Schaeffer M.D. By Yayo Sims RN    Thank you for allowing me to participate in the care of this individual.      Jose Antonio Kumar.  Torsten Reyes M.D., [address] [place]

## 2021-10-14 ENCOUNTER — OFFICE VISIT (OUTPATIENT)
Dept: CARDIOLOGY CLINIC | Age: 76
End: 2021-10-14
Payer: MEDICARE

## 2021-10-14 VITALS
SYSTOLIC BLOOD PRESSURE: 118 MMHG | DIASTOLIC BLOOD PRESSURE: 74 MMHG | HEIGHT: 63 IN | BODY MASS INDEX: 27.46 KG/M2 | OXYGEN SATURATION: 98 % | WEIGHT: 155 LBS | HEART RATE: 67 BPM

## 2021-10-14 DIAGNOSIS — I10 BENIGN ESSENTIAL HYPERTENSION: Primary | ICD-10-CM

## 2021-10-14 DIAGNOSIS — I65.23 BILATERAL CAROTID ARTERY STENOSIS: ICD-10-CM

## 2021-10-14 DIAGNOSIS — E78.00 PURE HYPERCHOLESTEROLEMIA: ICD-10-CM

## 2021-10-14 PROCEDURE — 1123F ACP DISCUSS/DSCN MKR DOCD: CPT | Performed by: INTERNAL MEDICINE

## 2021-10-14 PROCEDURE — 3017F COLORECTAL CA SCREEN DOC REV: CPT | Performed by: INTERNAL MEDICINE

## 2021-10-14 PROCEDURE — 99214 OFFICE O/P EST MOD 30 MIN: CPT | Performed by: INTERNAL MEDICINE

## 2021-10-14 PROCEDURE — G8484 FLU IMMUNIZE NO ADMIN: HCPCS | Performed by: INTERNAL MEDICINE

## 2021-10-14 PROCEDURE — 1090F PRES/ABSN URINE INCON ASSESS: CPT | Performed by: INTERNAL MEDICINE

## 2021-10-14 PROCEDURE — 1036F TOBACCO NON-USER: CPT | Performed by: INTERNAL MEDICINE

## 2021-10-14 PROCEDURE — 4040F PNEUMOC VAC/ADMIN/RCVD: CPT | Performed by: INTERNAL MEDICINE

## 2021-10-14 PROCEDURE — G8427 DOCREV CUR MEDS BY ELIG CLIN: HCPCS | Performed by: INTERNAL MEDICINE

## 2021-10-14 PROCEDURE — G8417 CALC BMI ABV UP PARAM F/U: HCPCS | Performed by: INTERNAL MEDICINE

## 2021-10-14 PROCEDURE — G8399 PT W/DXA RESULTS DOCUMENT: HCPCS | Performed by: INTERNAL MEDICINE

## 2021-10-14 RX ORDER — ATORVASTATIN CALCIUM 20 MG/1
20 TABLET, FILM COATED ORAL DAILY
Qty: 90 TABLET | Refills: 3 | Status: SHIPPED | OUTPATIENT
Start: 2021-10-14 | End: 2022-01-26 | Stop reason: SDUPTHER

## 2021-10-14 NOTE — LETTER
Meera Dominguez  [birth year]  [facility]   Cardiac Consultation    Referring Provider:  GIULIANO Vyas - CNP     Chief Complaint   Patient presents with    Follow-up    Hypertension      Meera Dominguez   1945    History of Present Illness:   Meera Dominguez is a 78 y.o. female who is here today for follow up for a past medical history of past medical history of chest pain, diabetes mellitus, asthma, hypertension, and hyperlipidemia. She presented to [facility] in [place] on 3/31/2021 with complaints of chest pain. She was admitted for further work up to rule out ACS. Chest Xray and Troponin's negative. Carotid dopplers with 50-69% stenosis in right and left carotid artery. Started on statin therapy. EKG showed sinus bradycardia. An echocardiogram on 4/1/2021 showed an EF of 60% with mild mitral and tricuspid regurgitation. At her last visit she reported having SOB, chest pain and all over body tingling while riding her bile to get the mail in [place]. She wore a cardiac event monitor from 4/12-4/18/2021 which showed an average heart rate of 67 (), 1.6% PVC's, 0.95% PAC's. Stress test 4/12/22021 showed no ischemia or scar. She was started on Valsartan 160 mg daily for better blood pressure control. Pravastatin changed to Lipitor and Asprin 81 mg recommended. Today she states she has been feeling well since her last visit. She states she has had no further chest pain, palpitations or SOB. She is tolerating her medications and taking them as prescribed. She has noted a recent cough that she thinks is related to allergies, no fevers. She has not started Aspirin due to concerns with taking meloxicam for her arthritis. She has a history of stomach ulcers. She checks her blood pressure occasionally at home and it runs 120/130's. Patient currently denies any weight gain, edema, palpitations, chest pain, shortness of breath, dizziness, and syncope.     Past Medical History:   has a past medical history of Chest pain NEC, Depression, Hyperlipemia, and Routine health maintenance. Surgical History:   has a past surgical history that includes Rotator cuff repair (2002, 3/2011); Appendectomy; Dilation and curettage of uterus; Hemorrhoid surgery; pipo and bso (cervix removed) (07/2016); Colonoscopy (08/03/2017); hernia repair (Right, 10/17/2017); epidural steroid injection (Right, 6/24/2019); and epidural steroid injection (Right, 12/3/2019). Social History:   reports that she has never smoked. She has never used smokeless tobacco. She reports current alcohol use. She reports that she does not use drugs. Family History:  family history includes Arthritis in her sister; Diabetes in her father. Home Medications:  Prior to Admission medications    Medication Sig Start Date End Date Taking? Authorizing Provider   pravastatin (PRAVACHOL) 20 MG tablet Take 1 tablet by mouth daily 9/30/21  Yes GIULIANO Marsh - CNP   chlorthalidone (HYGROTON) 25 MG tablet TAKE 1 TABLET BY MOUTH  DAILY 7/30/21  Yes GIULIANO Marsh CNP   metFORMIN (GLUCOPHAGE-XR) 500 MG extended release tablet TAKE 1 TABLET BY MOUTH  TWICE DAILY 7/30/21  Yes GIULIANO Marsh - CNP   valsartan (DIOVAN) 160 MG tablet Take 1 tablet by mouth daily 6/15/21  Yes Kayla Bazzi MD   montelukast (SINGULAIR) 10 MG tablet TAKE 1 TABLET BY MOUTH AT  NIGHT 12/10/20  Yes GIULIANO Marsh - CNP   meloxicam (MOBIC) 15 MG tablet TAKE 1 TABLET BY MOUTH  DAILY 12/10/20  Yes GIULIANO Marsh - CNP   SYMBICORT 160-4.5 MCG/ACT AERO USE 2 INHALATIONS BY MOUTH  TWICE DAILY 12/7/20  Yes GIULIANO Marsh - CNP   albuterol sulfate HFA (PROAIR HFA) 108 (90 Base) MCG/ACT inhaler Inhale 1-2 puffs into the lungs every 4 hours as needed for Wheezing or Shortness of Breath 6/7/18  Yes Nguyen Varma MD   Acetaminophen (TYLENOL PO) Take  by mouth.    Yes Historical Provider, MD   fluticasone (FLONASE) 50 MCG/ACT nasal spray 1-2 sprays by Nasal route nightly. 11/4/13  Yes Brandee Lewis MD   NAPROXEN PO Take by mouth    Historical Provider, MD   Misc. Devices (ALL-BODY MASSAGE) MISC As directed 4/29/21   Felecia Pale, APRN - CNP   [employer] Lancets Ultra Thin MISC 1 each by Does not apply route daily 9/25/19   Brandee Lewis MD   blood glucose monitor strips Test 1 times a day 9/25/19   Brandee Lewis MD        Allergies:  Flagyl [metronidazole], Daypro [oxaprozin], Sulfa antibiotics, Tramadol, Amlodipine, and Lisinopril     Review of Systems:   · Constitutional: there has been no unanticipated weight loss. There's been no change in energy level, sleep pattern, or activity level. · Eyes: No visual changes or diplopia. No scleral icterus. · ENT: No Headaches, hearing loss or vertigo. No mouth sores or sore throat. · Cardiovascular: Reviewed in HPI  · Respiratory: No cough or wheezing, no sputum production. No hematemesis. · Gastrointestinal: No abdominal pain, appetite loss, blood in stools. No change in bowel or bladder habits. · Genitourinary: No dysuria, trouble voiding, or hematuria. · Musculoskeletal:  No gait disturbance, weakness or joint complaints. · Integumentary: No rash or pruritis. · Neurological: No headache, diplopia, change in muscle strength, numbness or tingling. No change in gait, balance, coordination, mood, affect, memory, mentation, behavior. · Psychiatric: No anxiety, no depression. · Endocrine: No malaise, fatigue or temperature intolerance. No excessive thirst, fluid intake, or urination. No tremor. · Hematologic/Lymphatic: No abnormal bruising or bleeding, blood clots or swollen lymph nodes. · Allergic/Immunologic: No nasal congestion or hives.     Physical Examination:    Vitals:    10/14/21 0949   BP: 118/74   Pulse: 67   SpO2: 98%        Constitutional and General Appearance: NAD   Respiratory:  · Normal excursion and expansion without use of accessory muscles  · Resp Auscultation: Normal breath sounds without dullness  Cardiovascular:  · The apical impulses not displaced  · Heart tones are crisp and normal  · Cervical veins are not engorged  · The carotid upstroke is normal in amplitude and contour without delay or bruit  · Normal S1S2, No S3, No Murmur  · Peripheral pulses are symmetrical and full  · There is no clubbing, cyanosis of the extremities. · No edema  · Femoral Arteries: 2+ and equal  · Pedal Pulses: 2+ and equal   Abdomen:  · No masses or tenderness  · Liver/Spleen: No Abnormalities Noted  Neurological/Psychiatric:  · Alert and oriented in all spheres  · Moves all extremities well  · Exhibits normal gait balance and coordination  · No abnormalities of mood, affect, memory, mentation, or behavior are noted    Stress test 2018  Summary  There is normal isotope uptake at stress and rest. There is no evidence of  myocardial ischemia or scar. Hyperdynamic LV systolic function with OR>05%  with uniform wall motion. Low risk study. EKG 4/1/2021      Echocardiogram 4/1/2021 [facility] in [place]       Carotid dopplers 4/1/2021 [facility] in [address] test 4/12/2021  Conclusions    Summary  There is normal isotope uptake at stress and rest. There is no evidence of  myocardial ischemia or scar. Hyperdynamic LV systolic function with DK>00%  with uniform wall motion. Low risk study. Cardiac event monitor 4/12-4/18/2021   Average heart rate 67 (), 1.6% PVC's, 0.85% PAC's         Assessment:   Chest pain - typical/atypical features. No recurrence since last OV  SOB - not cardiac based on testing.  decreased   Palpitations due to PVCs, PAVS. Decreased   Asthma   Hypertension - improved  Hyperlipidemia - improved  Diabetes   Asthma   Both sisters with a history of atrial fib   Carotid stenosis   History of stomach Ulcers     Plan:  Plan to repeat carotid dopplers April 2022  Cardiac medications reviewed including indications and pertinent side effects- Lipitor (atovastatin) 20 mg daily   EcAspirin 81 mg daily- R/B/A/E discussed . Check blood pressure and heart rate at home a few times per week- keep a log with dates and times and bring to office visit   Call if you continue to have a cough   Regular exercise and following a healthy diet encouraged   Follow up with me when you primo back from [place]     The scribes documentation has been prepared under my direction and personally reviewed by me in its entirety. I confirm that the note above accurately reflects all work, treatment, procedures, and medical decision making performed by me. Dr. Ashwin Bullock MD        Scribe's attestation: This note was scribed in the presence of Dr. Ashwin Bullock M.D. By Prudence Jarvis RN    Thank you for allowing me to participate in the care of this individual.      Claudy Noonan.  Kalani Farfan M.D., Felicita [place]water

## 2021-10-14 NOTE — PATIENT INSTRUCTIONS
Plan:  Plan to repeat carotid dopplers April 2022  Cardiac medications reviewed including indications and pertinent side effects- Lipitor (atovastatin) 20 mg daily   Start enteric coated Aspirin 81 mg daily- discussed risk developing Ulcers with meloxicam and Aspirin. Check blood pressure and heart rate at home a few times per week- keep a log with dates and times and bring to office visit   Call if you continue to have a cough   Regular exercise and following a healthy diet encouraged   Follow up with me when you primo back [facility] provider has ordered testing for further evaluation. An order/prescription has been included in your paper work.  To schedule outpatient testing, contact Central Scheduling by calling [address] [place] [address] ([phone]).

## 2021-10-19 ENCOUNTER — TELEPHONE (OUTPATIENT)
Dept: FAMILY MEDICINE CLINIC | Age: 76
End: 2021-10-19

## 2021-10-19 NOTE — TELEPHONE ENCOUNTER
Patient requests you call her when it is convenient for you, she has some questions for you about her GI issues that she has been seeing the specialist about.

## 2021-10-19 NOTE — TELEPHONE ENCOUNTER
Patient was contacted to discuss plan of care.   Please obtain all records from gastroenterologist office, Dr. Gideon Mohamud.

## 2021-10-26 ENCOUNTER — TELEPHONE (OUTPATIENT)
Dept: FAMILY MEDICINE CLINIC | Age: 76
End: 2021-10-26

## 2021-10-26 NOTE — TELEPHONE ENCOUNTER
Patient called and you had requested records from the GI doctor. We have received the records  You was going to review and call her back about of plan of care.

## 2021-10-26 NOTE — TELEPHONE ENCOUNTER
Records were received from patient's gastroenterologist today in regards to treatment planned. It does appear that the next step for evaluation is for patient to have colonoscopy with biopsies to assess for microscopic colitis. I would recommend that patient continue with diet and fiber supplements as recommended at this time. If symptoms persist following up for colonoscopy for further evaluation.

## 2021-10-26 NOTE — TELEPHONE ENCOUNTER
Information received was very minimal and we did request additional office notes so that I could evaluate what the plan of care was. Received information was not very detailed. Please notify patient and call GIs office to check on status of additional requested information.

## 2021-10-27 NOTE — TELEPHONE ENCOUNTER
Patient notified and will continue with the plan and will let us know if symptoms do not get any better

## 2021-11-08 ENCOUNTER — TELEPHONE (OUTPATIENT)
Dept: FAMILY MEDICINE CLINIC | Age: 76
End: 2021-11-08

## 2021-11-08 DIAGNOSIS — J01.90 ACUTE SINUSITIS, RECURRENCE NOT SPECIFIED, UNSPECIFIED LOCATION: Primary | ICD-10-CM

## 2021-11-08 DIAGNOSIS — Z20.822 SUSPECTED COVID-19 VIRUS INFECTION: Primary | ICD-10-CM

## 2021-11-08 DIAGNOSIS — Z20.822 SUSPECTED COVID-19 VIRUS INFECTION: ICD-10-CM

## 2021-11-08 LAB — SARS-COV-2: NEGATIVE

## 2021-11-08 RX ORDER — AZITHROMYCIN 250 MG/1
250 TABLET, FILM COATED ORAL SEE ADMIN INSTRUCTIONS
Qty: 6 TABLET | Refills: 0 | Status: SHIPPED | OUTPATIENT
Start: 2021-11-08 | End: 2021-11-13

## 2021-11-08 NOTE — TELEPHONE ENCOUNTER
Patient has a lot of drainage, a cough, some sob, no fever. Order sent to be covid tested and then we will go from there for scheduling.

## 2021-11-15 ENCOUNTER — OFFICE VISIT (OUTPATIENT)
Dept: FAMILY MEDICINE CLINIC | Age: 76
End: 2021-11-15
Payer: MEDICARE

## 2021-11-15 VITALS
OXYGEN SATURATION: 98 % | SYSTOLIC BLOOD PRESSURE: 121 MMHG | BODY MASS INDEX: 27.37 KG/M2 | TEMPERATURE: 98 F | WEIGHT: 154.5 LBS | DIASTOLIC BLOOD PRESSURE: 60 MMHG | HEART RATE: 67 BPM

## 2021-11-15 DIAGNOSIS — J40 BRONCHITIS: Primary | ICD-10-CM

## 2021-11-15 DIAGNOSIS — J45.21 MILD INTERMITTENT ASTHMA WITH EXACERBATION: ICD-10-CM

## 2021-11-15 PROCEDURE — 1090F PRES/ABSN URINE INCON ASSESS: CPT | Performed by: NURSE PRACTITIONER

## 2021-11-15 PROCEDURE — G8399 PT W/DXA RESULTS DOCUMENT: HCPCS | Performed by: NURSE PRACTITIONER

## 2021-11-15 PROCEDURE — G8427 DOCREV CUR MEDS BY ELIG CLIN: HCPCS | Performed by: NURSE PRACTITIONER

## 2021-11-15 PROCEDURE — 3017F COLORECTAL CA SCREEN DOC REV: CPT | Performed by: NURSE PRACTITIONER

## 2021-11-15 PROCEDURE — 99213 OFFICE O/P EST LOW 20 MIN: CPT | Performed by: NURSE PRACTITIONER

## 2021-11-15 PROCEDURE — 1123F ACP DISCUSS/DSCN MKR DOCD: CPT | Performed by: NURSE PRACTITIONER

## 2021-11-15 PROCEDURE — G8417 CALC BMI ABV UP PARAM F/U: HCPCS | Performed by: NURSE PRACTITIONER

## 2021-11-15 PROCEDURE — G8484 FLU IMMUNIZE NO ADMIN: HCPCS | Performed by: NURSE PRACTITIONER

## 2021-11-15 PROCEDURE — 4040F PNEUMOC VAC/ADMIN/RCVD: CPT | Performed by: NURSE PRACTITIONER

## 2021-11-15 PROCEDURE — 1036F TOBACCO NON-USER: CPT | Performed by: NURSE PRACTITIONER

## 2021-11-15 RX ORDER — ALBUTEROL SULFATE 1.25 MG/3ML
1 SOLUTION RESPIRATORY (INHALATION) EVERY 4 HOURS PRN
Qty: 25 EACH | Refills: 0 | Status: SHIPPED | OUTPATIENT
Start: 2021-11-15

## 2021-11-15 RX ORDER — PREDNISONE 20 MG/1
40 TABLET ORAL
Qty: 14 TABLET | Refills: 0 | Status: SHIPPED | OUTPATIENT
Start: 2021-11-15 | End: 2021-11-22

## 2021-11-15 ASSESSMENT — ENCOUNTER SYMPTOMS
COUGH: 1
CHEST TIGHTNESS: 1
EYES NEGATIVE: 1
GASTROINTESTINAL NEGATIVE: 1
SORE THROAT: 0
RHINORRHEA: 0
WHEEZING: 1

## 2021-11-15 NOTE — PROGRESS NOTES
CHIEF COMPLAINT  Chief Complaint   Patient presents with    Cough     no better    Wheezing    Other     Chest feels heavy        HPI   Aby Jacinto is a 78 y.o. female who presents to the office complaining of chest congestion, cough, wheezing. Patient reports that her symptoms have not improved since taking her antibiotic. Patient reports that symptoms have been going on for over a week now. No fever or chills. Patient reports eating and drinking appropriately. Patient reports using prescribed inhaler as well as with breathing treatments for symptomatic relief. Patient reports that she did recently start taking Mucinex again yesterday. No other complaints, modifying factors or associated symptoms. Nursing notes reviewed.    Past Medical History:   Diagnosis Date    Chest pain NEC 7/04    negative GXT/cardiolite    Depression     1997    Hyperlipemia     Routine health maintenance     GYN--TAC(6/07)     Past Surgical History:   Procedure Laterality Date    APPENDECTOMY      COLONOSCOPY  08/03/2017    diverticulosis    DILATION AND CURETTAGE OF UTERUS      EPIDURAL STEROID INJECTION Right 6/24/2019    RIGHT LUMBAR TWO AND RIGHT LUMBAR FIVE SACRAL ONE EPIDURAL STEROID INJECTION SITE CONFIRMED BY FLUOROSCOPY performed by Khoa Turner MD at [facility] Right 12/3/2019    RIGHT LUMBAR TWO LUMBAR THREE TRANSFORMINAL EPIDURAL STEROID INJECTION AND RIGHT LUMBAR FIVE SACRAL ONE EPIDURAL STEROID INJECTION SITE CONFIRMED BY FLUOROSCOPY performed by Khoa Turner MD at [place] [address] Right 10/17/2017    ROBOTIC, RIGHT INGUINAL HERNIA REPAIR WITH MESH    ROTATOR CUFF REPAIR  2002, 3/2011    [facility]     Family History   Problem Relation Age of Onset    Diabetes Father     Arthritis Sister      Social History     Socioeconomic History    Marital status:      Spouse name: Not on file    Number of children: Not on file    Years of education: Not on file    Highest education level: Not on file   Occupational History    Not on file   Tobacco Use    Smoking status: Never Smoker    Smokeless tobacco: Never Used   Substance and Sexual Activity    Alcohol use: Yes     Comment: social    Drug use: No    Sexual activity: Not on file   Other Topics Concern    Not on file   Social History Narrative    Not on file     Social Determinants of Health     Financial Resource Strain:     Difficulty of Paying Living Expenses: Not on file   Food Insecurity:     Worried About Running Out of Food in the Last Year: Not on file    Jyothi of Food in the Last Year: Not on file   Transportation Needs:     Lack of Transportation (Medical): Not on file    Lack of Transportation (Non-Medical):  Not on file   Physical Activity:     Days of Exercise per Week: Not on file    Minutes of Exercise per Session: Not on file   Stress:     Feeling of Stress : Not on file   Social Connections:     Frequency of Communication with Friends and Family: Not on file    Frequency of Social Gatherings with Friends and Family: Not on file    Attends Hoahaoism Services: Not on file    Active Member of [address] [place] [address] [employer] or Organizations: Not on file    Attends Club or Organization Meetings: Not on file    Marital Status: Not on file   Intimate Partner Violence:     Fear of Current or Ex-Partner: Not on file    Emotionally Abused: Not on file    Physically Abused: Not on file    Sexually Abused: Not on file   Housing Stability:     Unable to Pay for Housing in the Last Year: Not on file    Number of [place] in the Last Year: Not on file    Unstable Housing in the Last Year: Not on file     Current Outpatient Medications   Medication Sig Dispense Refill    predniSONE (DELTASONE) 20 MG tablet Take 2 tablets by mouth daily (with breakfast) for 7 days Take with food 14 tablet 0    albuterol (ACCUNEB) 1.25 Neurological: Negative. Psychiatric/Behavioral: Negative. PHYSICAL EXAM  /60   Pulse 67   Temp 98 °F (36.7 °C) (Oral)   Wt 154 lb 8 oz (70.1 kg)   SpO2 98%   BMI 27.37 kg/m²   Physical Exam  Constitutional:       Appearance: Normal appearance. She is not toxic-appearing. HENT:      Head: Normocephalic. Right Ear: Tympanic membrane normal.      Left Ear: Tympanic membrane normal.      Nose: Nose normal.      Mouth/Throat:      Mouth: Mucous membranes are moist.      Pharynx: Oropharynx is clear. Eyes:      Extraocular Movements: Extraocular movements intact. Pupils: Pupils are equal, round, and reactive to light. Cardiovascular:      Rate and Rhythm: Normal rate. Pulses: Normal pulses. Heart sounds: Normal heart sounds. Pulmonary:      Effort: Pulmonary effort is normal.      Breath sounds: Normal breath sounds. Abdominal:      Palpations: Abdomen is soft. Tenderness: There is no guarding. Musculoskeletal:         General: Normal range of motion. Cervical back: Normal range of motion. Skin:     General: Skin is warm and dry. Capillary Refill: Capillary refill takes less than 2 seconds. Findings: No rash. Neurological:      Mental Status: She is alert and oriented to person, place, and time. ASSESSMENT/PLAN:   1. Bronchitis  Patient presents today with ongoing chest congestion, tightness, wheezing. Patient reports clear productive sputum with cough. Patient reports that symptoms have been ongoing for over 1 week now. No fever or chills. Patient reports taking her Z-Kev with no relief of symptoms. Patient reports that she did restart on Mucinex yesterday. Patient has prescribed inhalers that she has been taking as well as old DuoNeb's. Patient denies any fever or chills. Patient has known history of asthma.   Patient reports that she has had to have steroids in the past.  We did discuss the possibility of obtaining a chest x-ray as well. Patient was placed on a course of steroids as well as a prescription sent for DuoNerachelle to pharmacy. Patient encouraged to continue with prescribed inhalers and follow-up for any new or worsening symptoms. Patient aware that steroids can cause hyperglycemia so she will need to monitor dietary intake and blood glucose. Patient verbalized and acknowledges with plan of care at this time. - predniSONE (DELTASONE) 20 MG tablet; Take 2 tablets by mouth daily (with breakfast) for 7 days Take with food  Dispense: 14 tablet; Refill: 0  - albuterol (ACCUNEB) 1.25 MG/3ML nebulizer solution; Inhale 3 mLs into the lungs every 4 hours as needed for Wheezing or Shortness of Breath DX: J45.20  Dispense: 25 each; Refill: 0    2. Mild intermittent asthma with exacerbation  See above #1  - predniSONE (DELTASONE) 20 MG tablet; Take 2 tablets by mouth daily (with breakfast) for 7 days Take with food  Dispense: 14 tablet; Refill: 0         The note was completed using Dragon voice recognition transcription. Every effort was made to ensure accuracy; however, inadvertent  transcription errors may be present despite my best efforts to edit errors.     Kristine Burt, APRN - CNP

## 2021-11-15 NOTE — PATIENT INSTRUCTIONS
Please read the healthy family handout that you were given and share it with your family. Please compare this printed medication list with your medications at home to be sure they are the same. If you have any medications that are different please contact us immediately at [phone]. Also review your allergies that we have listed, these may also include medications that you have not been able to tolerate, make sure everything listed is correct. If you have any allergies that are different please contact us immediately at [phone]. Patient Education        Bronchitis: Care Instructions  Your Care Instructions     Bronchitis is inflammation of the bronchial tubes, which carry air to the lungs. The tubes swell and produce mucus, or phlegm. The mucus and inflamed bronchial tubes make you cough. You may have trouble breathing. Most cases of bronchitis are caused by viruses like those that cause colds. Antibiotics usually do not help and they may be harmful. Bronchitis usually develops rapidly and lasts about 2 to 3 weeks in otherwise healthy people. Follow-up care is a key part of your treatment and safety. Be sure to make and go to all appointments, and call your doctor if you are having problems. It's also a good idea to know your test results and keep a list of the medicines you take. How can you care for yourself at home? · Take all medicines exactly as prescribed. Call your doctor if you think you are having a problem with your medicine. · Get some extra rest.  · Take an over-the-counter pain medicine, such as acetaminophen (Tylenol), ibuprofen (Advil, Motrin), or naproxen (Aleve) to reduce fever and relieve body aches. Read and follow all instructions on the label. · Do not take two or more pain medicines at the same time unless the doctor told you to. Many pain medicines have acetaminophen, which is Tylenol. Too much acetaminophen (Tylenol) can be harmful.   · Take an over-the-counter cough medicine to help quiet a dry, hacking cough so that you can sleep. Avoid cough medicines that have more than one active ingredient. Read and follow all instructions on the label. · Do not smoke. Smoking can make bronchitis worse. If you need help quitting, talk to your doctor about stop-smoking programs and medicines. These can increase your chances of quitting for good. When should you call for help? Call 911 anytime you think you may need emergency care. For example, call if:    · You have severe trouble breathing. Call your doctor now or seek immediate medical care if:    · You have new or worse trouble breathing.     · You cough up dark brown or bloody mucus (sputum).     · You have a new or higher fever.     · You have a new rash. Watch closely for changes in your health, and be sure to contact your doctor if:    · You cough more deeply or more often, especially if you notice more mucus or a change in the color of your mucus.     · You are not getting better as expected. Where can you learn more? Go to https://[employer]. org and sign in to your [facility] account. Enter H333 in the [employer] box to learn more about \"Bronchitis: Care Instructions. \"     If you do not have an account, please click on the \"Sign Up Now\" link. Current as of: July 6, 2021               Content Version: 13.0  © 2006-2021 [employer]. Care instructions adapted under license by [employer]. If you have questions about a medical condition or this instruction, always ask your healthcare professional. [place] [address] any warranty or liability for your use of this information.

## 2021-11-30 RX ORDER — VALSARTAN 160 MG/1
160 TABLET ORAL DAILY
Qty: 90 TABLET | Refills: 3 | Status: SHIPPED | OUTPATIENT
Start: 2021-11-30

## 2021-11-30 NOTE — TELEPHONE ENCOUNTER
Future appt scheduled 0 appt scheduled                 Last appt 11/15/2021      Last Written     valsartan (DIOVAN) 160 MG tablet  06/15/2021  #90  1 RF

## 2021-12-20 RX ORDER — BUDESONIDE AND FORMOTEROL FUMARATE DIHYDRATE 160; 4.5 UG/1; UG/1
AEROSOL RESPIRATORY (INHALATION)
Qty: 30.6 G | Refills: 3 | Status: SHIPPED | OUTPATIENT
Start: 2021-12-20

## 2022-01-04 ENCOUNTER — TELEPHONE (OUTPATIENT)
Dept: FAMILY MEDICINE CLINIC | Age: 77
End: 2022-01-04

## 2022-01-04 NOTE — TELEPHONE ENCOUNTER
Patient called and she was seen on 11-15-21 for a cough and wheezing. She was treated with prednisone and accuneb solution. She did get better. Now the cough has returned and head congestion. She is in [place] and saw urgent care and was given Prednisone. Taking Mucinex.  They told her she had asthmatic bronchitis

## 2022-01-04 NOTE — TELEPHONE ENCOUNTER
I would make sure the patient is drinking plenty of fluids continue with current medications. Did patient get checked for COVID-19?

## 2022-01-05 RX ORDER — METFORMIN HYDROCHLORIDE 500 MG/1
TABLET, EXTENDED RELEASE ORAL
Qty: 180 TABLET | Refills: 1 | Status: SHIPPED | OUTPATIENT
Start: 2022-01-05 | End: 2022-06-09

## 2022-01-05 RX ORDER — CHLORTHALIDONE 25 MG/1
TABLET ORAL
Qty: 90 TABLET | Refills: 1 | Status: SHIPPED | OUTPATIENT
Start: 2022-01-05 | End: 2022-06-09

## 2022-01-05 NOTE — TELEPHONE ENCOUNTER
Future appt scheduled due in Feb spoke to patient and she will be in [place] until April  Last appt 8/3/21  Refilled medication per verbal order from provider.

## 2022-01-05 NOTE — TELEPHONE ENCOUNTER
I left message for patient last night that she needed a covid test. Patient called back today and her covid test is negative.  Patient states she feels better and will observe symptoms for now

## 2022-01-26 RX ORDER — ATORVASTATIN CALCIUM 20 MG/1
20 TABLET, FILM COATED ORAL DAILY
Qty: 90 TABLET | Refills: 3 | Status: SHIPPED | OUTPATIENT
Start: 2022-01-26

## 2022-01-26 NOTE — TELEPHONE ENCOUNTER
Pt wants her Lipitor sent to mail order pharmacy, [address] [facility], [address] [facility] [facility] [address]  [address]

## 2022-01-27 RX ORDER — MONTELUKAST SODIUM 10 MG/1
TABLET ORAL
Qty: 90 TABLET | Refills: 3 | Status: SHIPPED | OUTPATIENT
Start: 2022-01-27

## 2022-02-21 RX ORDER — MELOXICAM 15 MG/1
TABLET ORAL
Qty: 90 TABLET | Refills: 3 | Status: SHIPPED | OUTPATIENT
Start: 2022-02-21

## 2022-02-21 NOTE — TELEPHONE ENCOUNTER
Future appt scheduled 0 appt scheduled- Return in about 6 months (around 2/3/2022),                 Last appt 11/15/2021      Last Written 12/10/2020    meloxicam (MOBIC) 15 MG tablet  #90  3 RF

## 2022-04-25 ENCOUNTER — OFFICE VISIT (OUTPATIENT)
Dept: FAMILY MEDICINE CLINIC | Age: 77
End: 2022-04-25
Payer: MEDICARE

## 2022-04-25 VITALS
OXYGEN SATURATION: 98 % | HEART RATE: 63 BPM | BODY MASS INDEX: 26.99 KG/M2 | TEMPERATURE: 98.2 F | DIASTOLIC BLOOD PRESSURE: 64 MMHG | WEIGHT: 152.38 LBS | SYSTOLIC BLOOD PRESSURE: 106 MMHG

## 2022-04-25 DIAGNOSIS — I10 BENIGN ESSENTIAL HYPERTENSION: Primary | ICD-10-CM

## 2022-04-25 DIAGNOSIS — K58.0 IRRITABLE BOWEL SYNDROME WITH DIARRHEA: ICD-10-CM

## 2022-04-25 DIAGNOSIS — E55.9 VITAMIN D DEFICIENCY: ICD-10-CM

## 2022-04-25 DIAGNOSIS — E11.9 CONTROLLED TYPE 2 DIABETES MELLITUS WITHOUT COMPLICATION, WITHOUT LONG-TERM CURRENT USE OF INSULIN (HCC): ICD-10-CM

## 2022-04-25 DIAGNOSIS — R53.83 FATIGUE, UNSPECIFIED TYPE: ICD-10-CM

## 2022-04-25 DIAGNOSIS — E78.00 PURE HYPERCHOLESTEROLEMIA: ICD-10-CM

## 2022-04-25 DIAGNOSIS — K21.9 GASTROESOPHAGEAL REFLUX DISEASE WITHOUT ESOPHAGITIS: ICD-10-CM

## 2022-04-25 LAB
A/G RATIO: 3 (ref 1.1–2.2)
ALBUMIN SERPL-MCNC: 4.8 G/DL (ref 3.4–5)
ALP BLD-CCNC: 91 U/L (ref 40–129)
ALT SERPL-CCNC: 14 U/L (ref 10–40)
ANION GAP SERPL CALCULATED.3IONS-SCNC: 15 MMOL/L (ref 3–16)
AST SERPL-CCNC: 13 U/L (ref 15–37)
BASOPHILS ABSOLUTE: 0 K/UL (ref 0–0.2)
BASOPHILS RELATIVE PERCENT: 1.1 %
BILIRUB SERPL-MCNC: 0.6 MG/DL (ref 0–1)
BUN BLDV-MCNC: 29 MG/DL (ref 7–20)
CALCIUM SERPL-MCNC: 9.7 MG/DL (ref 8.3–10.6)
CHLORIDE BLD-SCNC: 105 MMOL/L (ref 99–110)
CO2: 22 MMOL/L (ref 21–32)
CREAT SERPL-MCNC: 0.7 MG/DL (ref 0.6–1.2)
EOSINOPHILS ABSOLUTE: 0.1 K/UL (ref 0–0.6)
EOSINOPHILS RELATIVE PERCENT: 2.5 %
FOLATE: 14.49 NG/ML (ref 4.78–24.2)
GFR AFRICAN AMERICAN: >60
GFR NON-AFRICAN AMERICAN: >60
GLUCOSE BLD-MCNC: 137 MG/DL (ref 70–99)
HBA1C MFR BLD: 6.5 %
HCT VFR BLD CALC: 38 % (ref 36–48)
HEMOGLOBIN: 13.1 G/DL (ref 12–16)
LYMPHOCYTES ABSOLUTE: 0.9 K/UL (ref 1–5.1)
LYMPHOCYTES RELATIVE PERCENT: 22.4 %
MCH RBC QN AUTO: 31.7 PG (ref 26–34)
MCHC RBC AUTO-ENTMCNC: 34.4 G/DL (ref 31–36)
MCV RBC AUTO: 92.1 FL (ref 80–100)
MONOCYTES ABSOLUTE: 0.4 K/UL (ref 0–1.3)
MONOCYTES RELATIVE PERCENT: 9.1 %
NEUTROPHILS ABSOLUTE: 2.7 K/UL (ref 1.7–7.7)
NEUTROPHILS RELATIVE PERCENT: 64.9 %
PDW BLD-RTO: 12.6 % (ref 12.4–15.4)
PLATELET # BLD: 232 K/UL (ref 135–450)
PMV BLD AUTO: 9.4 FL (ref 5–10.5)
POTASSIUM SERPL-SCNC: 4.1 MMOL/L (ref 3.5–5.1)
RBC # BLD: 4.13 M/UL (ref 4–5.2)
SODIUM BLD-SCNC: 142 MMOL/L (ref 136–145)
T4 FREE: 1.2 NG/DL (ref 0.9–1.8)
TOTAL PROTEIN: 6.4 G/DL (ref 6.4–8.2)
TSH REFLEX: 2.23 UIU/ML (ref 0.27–4.2)
VITAMIN B-12: 342 PG/ML (ref 211–911)
VITAMIN D 25-HYDROXY: 37.1 NG/ML
WBC # BLD: 4.1 K/UL (ref 4–11)

## 2022-04-25 PROCEDURE — 1090F PRES/ABSN URINE INCON ASSESS: CPT | Performed by: NURSE PRACTITIONER

## 2022-04-25 PROCEDURE — G8399 PT W/DXA RESULTS DOCUMENT: HCPCS | Performed by: NURSE PRACTITIONER

## 2022-04-25 PROCEDURE — 4040F PNEUMOC VAC/ADMIN/RCVD: CPT | Performed by: NURSE PRACTITIONER

## 2022-04-25 PROCEDURE — G8417 CALC BMI ABV UP PARAM F/U: HCPCS | Performed by: NURSE PRACTITIONER

## 2022-04-25 PROCEDURE — 2022F DILAT RTA XM EVC RTNOPTHY: CPT | Performed by: NURSE PRACTITIONER

## 2022-04-25 PROCEDURE — 1036F TOBACCO NON-USER: CPT | Performed by: NURSE PRACTITIONER

## 2022-04-25 PROCEDURE — 3046F HEMOGLOBIN A1C LEVEL >9.0%: CPT | Performed by: NURSE PRACTITIONER

## 2022-04-25 PROCEDURE — 3017F COLORECTAL CA SCREEN DOC REV: CPT | Performed by: NURSE PRACTITIONER

## 2022-04-25 PROCEDURE — 36415 COLL VENOUS BLD VENIPUNCTURE: CPT | Performed by: NURSE PRACTITIONER

## 2022-04-25 PROCEDURE — 83036 HEMOGLOBIN GLYCOSYLATED A1C: CPT | Performed by: NURSE PRACTITIONER

## 2022-04-25 PROCEDURE — G8427 DOCREV CUR MEDS BY ELIG CLIN: HCPCS | Performed by: NURSE PRACTITIONER

## 2022-04-25 PROCEDURE — 1123F ACP DISCUSS/DSCN MKR DOCD: CPT | Performed by: NURSE PRACTITIONER

## 2022-04-25 PROCEDURE — 99214 OFFICE O/P EST MOD 30 MIN: CPT | Performed by: NURSE PRACTITIONER

## 2022-04-25 RX ORDER — OMEPRAZOLE 20 MG/1
20 CAPSULE, DELAYED RELEASE ORAL
Qty: 90 CAPSULE | Refills: 1 | Status: SHIPPED | OUTPATIENT
Start: 2022-04-25 | End: 2022-05-06 | Stop reason: SINTOL

## 2022-04-25 ASSESSMENT — ENCOUNTER SYMPTOMS
WHEEZING: 0
SHORTNESS OF BREATH: 0
RHINORRHEA: 0
VOMITING: 0
NAUSEA: 0
SORE THROAT: 0
ABDOMINAL PAIN: 0
TROUBLE SWALLOWING: 1
CHEST TIGHTNESS: 0
DIARRHEA: 0
VOICE CHANGE: 1
COUGH: 0

## 2022-04-25 NOTE — PROGRESS NOTES
CHIEF COMPLAINT  Chief Complaint   Patient presents with    Check-Up     HTN        HPI   Edel Rader is a 78 y.o. female who presents to the office for check up. Reports doing well. Patient reports that she has noticed some intermittent dizziness, fatigue and hoarseness in her voice. Patient reports that symptoms been ongoing for several months. Patient denies any chest pain or shortness of breath. No abdominal pain or discomfort. No nausea or vomiting. Patient denies any changes in urinary habits. Patient reports take medications as prescribed. No other complaints, modifying factors or associated symptoms. Nursing notes reviewed.    Past Medical History:   Diagnosis Date    Chest pain NEC 7/04    negative GXT/cardiolite    Depression     1997    Hyperlipemia     Routine health maintenance     GYN--TAC(6/07)     Past Surgical History:   Procedure Laterality Date    APPENDECTOMY      COLONOSCOPY  08/03/2017    diverticulosis    DILATION AND CURETTAGE OF UTERUS      EPIDURAL STEROID INJECTION Right 6/24/2019    RIGHT LUMBAR TWO AND RIGHT LUMBAR FIVE SACRAL ONE EPIDURAL STEROID INJECTION SITE CONFIRMED BY FLUOROSCOPY performed by Lore Tenorio MD at [facility] Right 12/3/2019    RIGHT LUMBAR TWO LUMBAR THREE TRANSFORMINAL EPIDURAL STEROID INJECTION AND RIGHT LUMBAR FIVE SACRAL ONE EPIDURAL STEROID INJECTION SITE CONFIRMED BY FLUOROSCOPY performed by Lore Tenorio MD at [place] [address] Right 10/17/2017    ROBOTIC, RIGHT INGUINAL HERNIA REPAIR WITH MESH    ROTATOR CUFF REPAIR  2002, 3/2011    MA[facility] BSO  07[facility]2016    [facility]     Family History   Problem Relation Age of Onset    Diabetes Father     Arthritis Sister      Social History     Socioeconomic History    Marital status:      Spouse name: Not on file    Number of children: Not on file    Years of education: Not on file   [facility] Highest education level: Not on file   Occupational History    Not on file   Tobacco Use    Smoking status: Never Smoker    Smokeless tobacco: Never Used   Substance and Sexual Activity    Alcohol use: Yes     Comment: social    Drug use: No    Sexual activity: Not on file   Other Topics Concern    Not on file   Social History Narrative    Not on file     Social Determinants of Health     Financial Resource Strain:     Difficulty of Paying Living Expenses: Not on file   Food Insecurity:     Worried About Running Out of Food in the Last Year: Not on file    Jyothi of Food in the Last Year: Not on file   Transportation Needs:     Lack of Transportation (Medical): Not on file    Lack of Transportation (Non-Medical):  Not on file   Physical Activity:     Days of Exercise per Week: Not on file    Minutes of Exercise per Session: Not on file   Stress:     Feeling of Stress : Not on file   Social Connections:     Frequency of Communication with Friends and Family: Not on file    Frequency of Social Gatherings with Friends and Family: Not on file    Attends Confucianism Services: Not on file    Active Member of [address] [place] [address] or Organizations: Not on file    Attends Club or Organization Meetings: Not on file    Marital Status: Not on file   Intimate Partner Violence:     Fear of Current or Ex-Partner: Not on file    Emotionally Abused: Not on file    Physically Abused: Not on file    Sexually Abused: Not on file   Housing Stability:     Unable to Pay for Housing in the Last Year: Not on file    Number of [place] in the Last Year: Not on file    Unstable Housing in the Last Year: Not on file     Current Outpatient Medications   Medication Sig Dispense Refill    omeprazole (PRILOSEC) 20 MG delayed release capsule Take 1 capsule by mouth every morning (before breakfast) 90 capsule 1    meloxicam (MOBIC) 15 MG tablet TAKE 1 TABLET BY MOUTH  DAILY 90 tablet 3    montelukast (SINGULAIR) 10 MG tablet TAKE 1 TABLET BY MOUTH AT  NIGHT 90 tablet 3    atorvastatin (LIPITOR) 20 MG tablet Take 1 tablet by mouth daily 90 tablet 3    chlorthalidone (HYGROTON) 25 MG tablet TAKE 1 TABLET BY MOUTH  DAILY 90 tablet 1    metFORMIN (GLUCOPHAGE-XR) 500 MG extended release tablet TAKE 1 TABLET BY MOUTH  TWICE DAILY 180 tablet 1    SYMBICORT 160-4.5 MCG/ACT AERO USE 2 INHALATIONS BY MOUTH  TWICE DAILY 30.6 g 3    valsartan (DIOVAN) 160 MG tablet TAKE 1 TABLET BY MOUTH  DAILY 90 tablet 3    albuterol (ACCUNEB) 1.25 MG/3ML nebulizer solution Inhale 3 mLs into the lungs every 4 hours as needed for Wheezing or Shortness of Breath DX: J45.20 25 each 0    albuterol sulfate HFA (PROAIR HFA) 108 (90 Base) MCG/ACT inhaler Inhale 1-2 puffs into the lungs every 4 hours as needed for Wheezing or Shortness of Breath 6 Inhaler 0    Acetaminophen (TYLENOL PO) Take  by mouth.  fluticasone (FLONASE) 50 MCG/ACT nasal spray 1-2 sprays by Nasal route nightly. 3 Bottle 1    NAPROXEN PO Take by mouth       No current facility-administered medications for this visit. Allergies   Allergen Reactions    Flagyl [Metronidazole]      hives    Daypro [Oxaprozin]     Sulfa Antibiotics     Tramadol      drowsy    Amlodipine      constipation    Lisinopril      Cough         REVIEW OF SYSTEMS  Review of Systems   Constitutional: Positive for fatigue. Negative for activity change, appetite change, chills and fever. HENT: Positive for trouble swallowing and voice change. Negative for congestion, rhinorrhea and sore throat. Eyes: Negative for visual disturbance. Respiratory: Negative for cough, chest tightness, shortness of breath and wheezing. Cardiovascular: Negative for chest pain and leg swelling. Gastrointestinal: Negative for abdominal pain, diarrhea, nausea and vomiting. Genitourinary: Negative for dysuria, frequency, hematuria and urgency. Musculoskeletal: Positive for arthralgias. Negative for gait problem and myalgias. Skin: Negative for rash. Neurological: Negative for dizziness, weakness, light-headedness, numbness and headaches. Psychiatric/Behavioral: Negative for self-injury and sleep disturbance. PHYSICAL EXAM  /64   Pulse 63   Temp 98.2 °F (36.8 °C) (Oral)   Wt 152 lb 6 oz (69.1 kg)   SpO2 98%   BMI 26.99 kg/m²   Physical Exam  Vitals reviewed. Constitutional:       General: She is not in acute distress. Appearance: Normal appearance. She is well-developed. She is not diaphoretic. HENT:      Head: Normocephalic and atraumatic. Right Ear: Tympanic membrane normal.      Left Ear: Tympanic membrane normal.      Nose: Nose normal.      Mouth/Throat:      Mouth: Mucous membranes are moist.      Pharynx: Oropharynx is clear. No oropharyngeal exudate or posterior oropharyngeal erythema. Eyes:      General:         Right eye: No discharge. Left eye: No discharge. Pupils: Pupils are equal, round, and reactive to light. Neck:      Vascular: No JVD. Cardiovascular:      Rate and Rhythm: Normal rate. Pulses: Normal pulses. Pulmonary:      Effort: Pulmonary effort is normal. No respiratory distress. Breath sounds: No stridor. No wheezing, rhonchi or rales. Chest:      Chest wall: No tenderness. Abdominal:      General: Bowel sounds are normal. There is no distension. Palpations: Abdomen is soft. Tenderness: There is no abdominal tenderness. There is no guarding. Musculoskeletal:         General: No swelling or deformity. Normal range of motion. Cervical back: Normal range of motion and neck supple. Skin:     General: Skin is warm and dry. Capillary Refill: Capillary refill takes less than 2 seconds. Coloration: Skin is not pale. Findings: No bruising, lesion or rash. Neurological:      General: No focal deficit present. Mental Status: She is alert and oriented to person, place, and time.    Psychiatric:         Mood and Affect: Mood normal.         Behavior: Behavior normal.        ASSESSMENT/PLAN:   1. Benign essential hypertension  Stable. Patient compliant with valsartan 160 mg daily. And chlorthalidone 25 mg daily. Continue current treatment management follow-up in 6 to 12 months, sooner for new or worsening symptoms.  - Comprehensive Metabolic Panel    2. Controlled type 2 diabetes mellitus without complication, without long-term current use of insulin (HCC)  Stable. Previous A1c 6.8, today 6.5. Patient compliant with metformin  mg twice a day. Patient encouraged to continue with current medication follow-up in 6 months, sooner for new or worsening symptoms.  - POCT glycosylated hemoglobin (Hb A1C)    3. Gastroesophageal reflux disease without esophagitis  Stable. Patient reports that she has noticed some difficulty with swallowing and feeling like her voice is hoarse. Patient has known history of GERD denies any indigestion. Patient reports being on omeprazole in the past but has been quite sometime. I did recommend restarting medications for symptomatic control if symptoms do not improve or worsen she may benefit from seeing GI for possible EGD. Patient verbalized and acknowledges with plan of care at this time. - omeprazole (PRILOSEC) 20 MG delayed release capsule; Take 1 capsule by mouth every morning (before breakfast)  Dispense: 90 capsule; Refill: 1    4. Irritable bowel syndrome with diarrhea  Stable. Intermittent flareups. No current abdominal pain, bloating, diarrhea or constipation. Continue current treatment management follow-up 6 months, sooner for new or worsening symptoms. 5. Pure hypercholesterolemia  Stable. Patient currently on pravastatin 20 mg daily. Continue with current treatment management follow-up in 6 months, sooner for new or worsening symptoms. 6. Vitamin D deficiency  Stable. I did recommend patient taking vitamin D supplement daily.   Patient verbalized and acknowledges follow-up in 6 months, sooner for new or worsening symptoms. - Vitamin D 25 Hydroxy    7. Fatigue, unspecified type  Patient reports feeling fatigued, dizzy and noticing changes in her voice over the past several months. Patient denies any syncope, chest pain or shortness of breath. We did discuss the importance of drinking plenty of fluids, well-balanced diet and regular exercise. Labs ordered and pending. We will follow-up with results.  - TSH with Reflex  - T4, Free  - CBC with Auto Differential  - Vitamin B12 & Folate         The note was completed using Dragon voice recognition transcription. Every effort was made to ensure accuracy; however, inadvertent  transcription errors may be present despite my best efforts to edit errors.     Thom Martinez, GIULIANO - CNP

## 2022-04-25 NOTE — PATIENT INSTRUCTIONS
Please read the healthy family handout that you were given and share it with your family. Please compare this printed medication list with your medications at home to be sure they are the same. If you have any medications that are different please contact us immediately at [phone]. Also review your allergies that we have listed, these may also include medications that you have not been able to tolerate, make sure everything listed is correct. If you have any allergies that are different please contact us immediately at [phone].

## 2022-05-03 ENCOUNTER — TELEMEDICINE (OUTPATIENT)
Dept: FAMILY MEDICINE CLINIC | Age: 77
End: 2022-05-03
Payer: MEDICARE

## 2022-05-03 DIAGNOSIS — Z00.00 INITIAL MEDICARE ANNUAL WELLNESS VISIT: Primary | ICD-10-CM

## 2022-05-03 PROCEDURE — G0438 PPPS, INITIAL VISIT: HCPCS | Performed by: NURSE PRACTITIONER

## 2022-05-03 PROCEDURE — 3017F COLORECTAL CA SCREEN DOC REV: CPT | Performed by: NURSE PRACTITIONER

## 2022-05-03 PROCEDURE — 4040F PNEUMOC VAC/ADMIN/RCVD: CPT | Performed by: NURSE PRACTITIONER

## 2022-05-03 PROCEDURE — 1123F ACP DISCUSS/DSCN MKR DOCD: CPT | Performed by: NURSE PRACTITIONER

## 2022-05-03 ASSESSMENT — LIFESTYLE VARIABLES
HOW OFTEN DO YOU HAVE A DRINK CONTAINING ALCOHOL: 2-4 TIMES A MONTH
HOW MANY STANDARD DRINKS CONTAINING ALCOHOL DO YOU HAVE ON A TYPICAL DAY: 1 OR 2
HOW OFTEN DO YOU HAVE A DRINK CONTAINING ALCOHOL: MONTHLY OR LESS
HOW MANY STANDARD DRINKS CONTAINING ALCOHOL DO YOU HAVE ON A TYPICAL DAY: 1 OR 2

## 2022-05-03 ASSESSMENT — PATIENT HEALTH QUESTIONNAIRE - PHQ9
1. LITTLE INTEREST OR PLEASURE IN DOING THINGS: 0
SUM OF ALL RESPONSES TO PHQ QUESTIONS 1-9: 0
2. FEELING DOWN, DEPRESSED OR HOPELESS: 0
SUM OF ALL RESPONSES TO PHQ QUESTIONS 1-9: 0
SUM OF ALL RESPONSES TO PHQ9 QUESTIONS 1 & 2: 0

## 2022-05-03 NOTE — PROGRESS NOTES
Medicare Annual Wellness Visit    Zoe Arriola is here for Edna MARTINEZ    Assessment & Plan   Initial Medicare annual wellness visit  -     David Be, Audiology, [facility]      Recommendations for Preventive Services Due: see orders and patient instructions/AVS.  Recommended screening schedule for the next 5-10 years is provided to the patient in written form: see Patient Instructions/AVS.     Return for Medicare Annual Wellness Visit in 1 year. Subjective   Patient presented today via telephone encounter for Medicare wellness visit. Patient's complete Health Risk Assessment and screening values have been reviewed and are found in Flowsheets. The following problems were reviewed today and where indicated follow up appointments were made and/or referrals ordered. Positive Risk Factor Screenings with Interventions:              Health Habits/Nutrition:     Physical Activity: Inactive    Days of Exercise per Week: 0 days    Minutes of Exercise per Session: 0 min     Have you lost any weight without trying in the past 3 months?: No     Have you seen the dentist within the past year?: Yes    Health Habits/Nutrition Interventions:  · Inadequate physical activity:  Patient reports riding her bike as well as using her Fitbit to help with exercise and walking.     Hearing/Vision:  Do you or your family notice any trouble with your hearing that hasn't been managed with hearing aids?: (!) Yes  Do you have difficulty driving, watching TV, or doing any of your daily activities because of your eyesight?: No  Have you had an eye exam within the past year?: Yes  No exam data present    Hearing/Vision Interventions:  · Hearing concerns:  audiology referral provided    Safety:  Do you have working smoke detectors?: Yes  Do you have any tripping hazards - loose or unsecured carpets or rugs?: (!) Yes  Do you have any tripping hazards - clutter in doorways, halls, or stairs?: No  Do you have either shower bars, grab bars, non-slip mats or non-slip surfaces in your shower or bathtub?: Yes  Do all of your stairways have a railing or banister?: Yes  Do you always fasten your seatbelt when you are in a car?: Yes    Safety Interventions:  · Home safety tips provided           Objective      Patient-Reported Vitals  Patient-Reported Systolic (Top): 200 mmHg  Patient-Reported Diastolic (Bottom): 70 mmHg       Unable to perform physical exam due to Medicare wellness visit performed via telephone encounter. Allergies   Allergen Reactions    Flagyl [Metronidazole]      hives    Daypro [Oxaprozin]     Sulfa Antibiotics     Tramadol      drowsy    Amlodipine      constipation    Lisinopril      Cough       Prior to Visit Medications    Medication Sig Taking?  Authorizing Provider   omeprazole (PRILOSEC) 20 MG delayed release capsule Take 1 capsule by mouth every morning (before breakfast) Yes GIULIANO Alcazar CNP   meloxicam (MOBIC) 15 MG tablet TAKE 1 TABLET BY MOUTH  DAILY Yes GIULIANO Alcazar CNP   montelukast (SINGULAIR) 10 MG tablet TAKE 1 TABLET BY MOUTH AT  NIGHT Yes GIULIANO Alcazar CNP   atorvastatin (LIPITOR) 20 MG tablet Take 1 tablet by mouth daily Yes Tigist Howell MD   chlorthalidone (HYGROTON) 25 MG tablet TAKE 1 TABLET BY MOUTH  DAILY Yes GIULIANO Alcazar CNP   metFORMIN (GLUCOPHAGE-XR) 500 MG extended release tablet TAKE 1 TABLET BY MOUTH  TWICE DAILY Yes GIULIANO Alcazar CNP   SYMBICORT 160-4.5 MCG/ACT AERO USE 2 INHALATIONS BY MOUTH  TWICE DAILY Yes GIULIANO Alcazar CNP   valsartan (DIOVAN) 160 MG tablet TAKE 1 TABLET BY MOUTH  DAILY Yes GIULIANO Alcazar CNP   albuterol (ACCUNEB) 1.25 MG/3ML nebulizer solution Inhale 3 mLs into the lungs every 4 hours as needed for Wheezing or Shortness of Breath DX: J45.20 Yes GIULIANO Alcazar CNP   albuterol sulfate HFA (PROAIR HFA) 108 (90 Base) MCG/ACT inhaler Inhale 1-2 puffs into the lungs every 4 hours as needed for Wheezing or Shortness of Breath Yes Davion Slaughter MD   Acetaminophen (TYLENOL PO) Take  by mouth. Yes Historical Provider, MD   fluticasone (FLONASE) 50 MCG/ACT nasal spray 1-2 sprays by Nasal route nightly. Yes Davion Slaughter MD   NAPROXEN PO Take by mouth  Historical Provider, MD Caballero (Including outside providers/suppliers regularly involved in providing care):   Patient Care Team:  GIULIANO Casiano CNP as PCP - General (Nurse Practitioner)  GIULIANO Casiano CNP as PCP - [facility]    Reviewed and updated this visit:  Allergies  Medroxy Helton, was evaluated through a synchronous (real-time) telephone encounter. The patient (or guardian if applicable) is aware that this is a billable service, which includes applicable co-pays. This Virtual Visit was conducted with patient's (and/or legal guardian's) consent. The visit was conducted pursuant to the emergency declaration under the [address] [place] [address] waiver authority and the [employer] Act. Patient identification was verified, and a caregiver was present when appropriate. The patient was located at home in a state where the provider was licensed to provide care.

## 2022-05-03 NOTE — PATIENT INSTRUCTIONS
Please read the healthy family handout that you were given and share it with your family. Please compare this printed medication list with your medications at home to be sure they are the same. If you have any medications that are different please contact us immediately at [phone]. Also review your allergies that we have listed, these may also include medications that you have not been able to tolerate, make sure everything listed is correct. If you have any allergies that are different please contact us immediately at [phone]. Personalized Preventive Plan for Jaciel Dean - 5/3/2022  Medicare offers a range of preventive health benefits. Some of the tests and screenings are paid in full while other may be subject to a deductible, co-insurance, and/or copay. Some of these benefits include a comprehensive review of your medical history including lifestyle, illnesses that may run in your family, and various assessments and screenings as appropriate. After reviewing your medical record and screening and assessments performed today your provider may have ordered immunizations, labs, imaging, and/or referrals for you. A list of these orders (if applicable) as well as your Preventive Care list are included within your After Visit Summary for your review. Other Preventive Recommendations:    · A preventive eye exam performed by an eye specialist is recommended every 1-2 years to screen for glaucoma; cataracts, macular degeneration, and other eye disorders. · A preventive dental visit is recommended every 6 months. · Try to get at least 150 minutes of exercise per week or 10,000 steps per day on a pedometer . · Order or download the FREE \"Exercise & Physical Activity: Your Everyday Guide\" from [employer]. Call 4-883.832.6102 or search [employer] online. · You need 7103-7488 mg of calcium and 8168-8960 IU of vitamin D per day.  It is possible to meet your calcium requirement with diet alone, but a vitamin D supplement is usually necessary to meet this goal.  · When exposed to the sun, use a sunscreen that protects against both UVA and UVB radiation with an SPF of 30 or greater. Reapply every 2 to 3 hours or after sweating, drying off with a towel, or swimming. · Always wear a seat belt when traveling in a car. Always wear a helmet when riding a bicycle or motorcycle.

## 2022-05-05 ENCOUNTER — HOSPITAL ENCOUNTER (OUTPATIENT)
Dept: VASCULAR LAB | Age: 77
Discharge: HOME | End: 2022-05-05
Payer: MEDICARE

## 2022-05-05 DIAGNOSIS — I65.23 BILATERAL CAROTID ARTERY STENOSIS: ICD-10-CM

## 2022-05-05 PROCEDURE — 93880 EXTRACRANIAL BILAT STUDY: CPT

## 2022-05-06 ENCOUNTER — TELEPHONE (OUTPATIENT)
Dept: FAMILY MEDICINE CLINIC | Age: 77
End: 2022-05-06

## 2022-05-06 ENCOUNTER — TELEPHONE (OUTPATIENT)
Dept: CARDIOLOGY CLINIC | Age: 77
End: 2022-05-06

## 2022-05-06 NOTE — TELEPHONE ENCOUNTER
Left message for patient to call back. When she calls back let her know- carotid dopplers showed- No sig plaque carotid arteries.

## 2022-05-06 NOTE — TELEPHONE ENCOUNTER
----- Message from Retia Paget, MD sent at 5/6/2022 11:47 AM EDT -----  Call  No sig plaque carotid arteries

## 2022-05-10 ENCOUNTER — PROCEDURE VISIT (OUTPATIENT)
Dept: AUDIOLOGY | Age: 77
End: 2022-05-10
Payer: MEDICARE

## 2022-05-10 DIAGNOSIS — H90.3 SENSORINEURAL HEARING LOSS, BILATERAL: Primary | ICD-10-CM

## 2022-05-10 DIAGNOSIS — R42 DIZZINESS AND GIDDINESS: ICD-10-CM

## 2022-05-10 DIAGNOSIS — H93.12 TINNITUS, LEFT: ICD-10-CM

## 2022-05-10 PROCEDURE — 92567 TYMPANOMETRY: CPT | Performed by: AUDIOLOGIST

## 2022-05-10 PROCEDURE — 92557 COMPREHENSIVE HEARING TEST: CPT | Performed by: AUDIOLOGIST

## 2022-05-10 NOTE — PROGRESS NOTES
Denice Frias   1945, 78 y.o. female   [id number]       Referring Provider: GIULIANO Rossi CNP   Referral Type: In an order in [address] [place] [address]    Reason for Visit: Evaluation of the cause of disorders of hearing, tinnitus, or balance. ADULT AUDIOLOGIC EVALUATION      Denice Frias is a 78 y.o. female seen today, 5/10/2022 , for an initial audiologic evaluation. Patient was alone. AUDIOLOGIC AND OTHER PERTINENT MEDICAL HISTORY:      Denice Frias noted tinnitus, dizziness, history of head trauma and family history of hearing loss. Patient reports family members have noticed a gradual decline in hearing, however, patient only perceives a problem in crowds. She noted intermittent left ear tinnitus. Patient has experienced dizziness triggered by bending over or reaching up. Patient has a family history of hearing loss related to factory work. Medical history is reportedly significant for head injury due to an accident. Denice Frias denied otalgia, aural fullness, history of occupational/recreational noise exposure and history of ear surgery. Date: 5/10/2022     IMPRESSIONS:      Normal middle ear pressure and compliance, bilaterally. Abnormal hearing sensitivity, bilaterally, which can affect difficult listening environments. Word understanding was excellent presented at elevated sensation levels, bilaterally. Hearing aids are recommended at this time. Follow medical recommendations of GIULIANO Rossi CNP . ASSESSMENT AND FINDINGS:     Otoscopy revealed: Clear ear canals bilaterally    RIGHT EAR:  Hearing Sensitivity:Mild to moderate sensorineural hearing loss from 250-8000 Hz  Speech Recognition Threshold: 35 dB HL  Word Recognition:Excellent (100%), based on NU-6 25-word list at 65 dBHL using recorded speech stimuli. Tympanometry: Normal peak pressure and compliance, Type A tympanogram, consistent with normal middle ear function.       LEFT EAR:  Hearing Sensitivity:Mild to moderate sensorineural hearing loss from 250-8000 Hz  Speech Recognition Threshold: 35 dB HL  Word Recognition: Excellent (96%), based on NU-6 25-word list at 65 dBHL using recorded speech stimuli. Tympanometry: Normal peak pressure and compliance, Type A tympanogram, consistent with normal middle ear function. Reliability: Good   Transducer: Inserts (rechecked with supraural headphones)    See scanned audiogram dated 5/10/2022  for results. PATIENT EDUCATION:       The following items were discussed with the patient:   - Good Communication Strategies  - Hearing Loss and Hearing Aids  - Tinnitus Management Strategies      Educational information was shared in the After Visit Summary. RECOMMENDATIONS:                                                                                                                                                                                                                                                            The following items are recommended based on patient report and results from today's appointment:   - Continue medical follow-up with GIULIANO Montanez CNP .   - Retest hearing as medically indicated and/or sooner if a change in hearing is noted. - If desired, schedule a Hearing Aid Evaluation (HAE) appointment to discuss hearing aid options. - Utilize \"Good Communication Strategies\" as discussed to assist in speech understanding with communication partners. - Maintain a sound enriched environment to assist in the management of tinnitus symptoms.        Latoya Saini  Audiologist    Chart CC'd to: GIULIANO Montanez CNP       Degree of   Hearing Sensitivity dB Range   Within Normal Limits (WNL) 0 - 20   Mild 20 - 40   Moderate 40 - 55   Moderately-Severe 55 - 70   Severe 70 - 90   Profound 90 +

## 2022-05-10 NOTE — PATIENT INSTRUCTIONS

## 2022-06-09 RX ORDER — CHLORTHALIDONE 25 MG/1
TABLET ORAL
Qty: 90 TABLET | Refills: 3 | Status: SHIPPED | OUTPATIENT
Start: 2022-06-09

## 2022-06-09 RX ORDER — METFORMIN HYDROCHLORIDE 500 MG/1
TABLET, EXTENDED RELEASE ORAL
Qty: 180 TABLET | Refills: 3 | Status: SHIPPED | OUTPATIENT
Start: 2022-06-09

## 2022-06-09 NOTE — TELEPHONE ENCOUNTER
Future appt scheduled 0 appt scheduled  Return in about 6 months (around 10/25/2022),                      Last appt 04/25/2022      Last Written     metFORMIN (GLUCOPHAGE-XR) 500 MG extended release tablet  01/05/2022  #180  1 RF       chlorthalidone (HYGROTON) 25 MG tablet  01/05/2022  #90  1 RF

## 2022-07-18 ENCOUNTER — OFFICE VISIT (OUTPATIENT)
Dept: FAMILY MEDICINE CLINIC | Age: 77
End: 2022-07-18
Payer: MEDICARE

## 2022-07-18 VITALS
DIASTOLIC BLOOD PRESSURE: 70 MMHG | BODY MASS INDEX: 27.19 KG/M2 | TEMPERATURE: 98.6 F | HEART RATE: 65 BPM | OXYGEN SATURATION: 96 % | SYSTOLIC BLOOD PRESSURE: 125 MMHG | WEIGHT: 153.5 LBS

## 2022-07-18 DIAGNOSIS — R06.09 DYSPNEA ON EXERTION: ICD-10-CM

## 2022-07-18 DIAGNOSIS — R82.90 ABNORMAL URINALYSIS: ICD-10-CM

## 2022-07-18 DIAGNOSIS — N39.0 URINARY TRACT INFECTION WITHOUT HEMATURIA, SITE UNSPECIFIED: Primary | ICD-10-CM

## 2022-07-18 DIAGNOSIS — J06.9 VIRAL URI: Primary | ICD-10-CM

## 2022-07-18 DIAGNOSIS — R39.15 URINARY URGENCY: ICD-10-CM

## 2022-07-18 PROCEDURE — G8399 PT W/DXA RESULTS DOCUMENT: HCPCS | Performed by: NURSE PRACTITIONER

## 2022-07-18 PROCEDURE — G8417 CALC BMI ABV UP PARAM F/U: HCPCS | Performed by: NURSE PRACTITIONER

## 2022-07-18 PROCEDURE — 1090F PRES/ABSN URINE INCON ASSESS: CPT | Performed by: NURSE PRACTITIONER

## 2022-07-18 PROCEDURE — 3017F COLORECTAL CA SCREEN DOC REV: CPT | Performed by: NURSE PRACTITIONER

## 2022-07-18 PROCEDURE — 99213 OFFICE O/P EST LOW 20 MIN: CPT | Performed by: NURSE PRACTITIONER

## 2022-07-18 PROCEDURE — G8427 DOCREV CUR MEDS BY ELIG CLIN: HCPCS | Performed by: NURSE PRACTITIONER

## 2022-07-18 PROCEDURE — 1036F TOBACCO NON-USER: CPT | Performed by: NURSE PRACTITIONER

## 2022-07-18 PROCEDURE — 1123F ACP DISCUSS/DSCN MKR DOCD: CPT | Performed by: NURSE PRACTITIONER

## 2022-07-18 PROCEDURE — 81002 URINALYSIS NONAUTO W/O SCOPE: CPT | Performed by: NURSE PRACTITIONER

## 2022-07-18 RX ORDER — NITROFURANTOIN 25; 75 MG/1; MG/1
100 CAPSULE ORAL 2 TIMES DAILY
Qty: 20 CAPSULE | Refills: 0 | Status: SHIPPED | OUTPATIENT
Start: 2022-07-18 | End: 2022-07-28

## 2022-07-18 ASSESSMENT — ENCOUNTER SYMPTOMS
SORE THROAT: 1
RHINORRHEA: 1
COUGH: 1
GASTROINTESTINAL NEGATIVE: 1
SHORTNESS OF BREATH: 1
EYES NEGATIVE: 1

## 2022-07-18 NOTE — PROGRESS NOTES
CHIEF COMPLAINT  Chief Complaint   Patient presents with    Cough    Shortness of Breath    Fatigue     Covid negative        HPI   Lashell Arenas is a 78 y.o. female who presents to the office complaining of cough, congestion, fatigue and shortness of breath. Patient reports that symptoms started last week with a sore throat. Patient reports minimal productive cough with sputum. No fever or chills. No nausea, vomiting. Patient denies any chest pain. Patient reports that she has had symptoms of fatigue and shortness of breath upon exertion for a while now. Patient reports her recent symptoms started on Tuesday. COVID test negative on Friday. Patient did not retest.  Patient reports that intermittently she does feel off balance/dizzy. Patient has known history of asthma. Patient denies any overusage of inhalers with onset of shortness of breath. Patient reports that she will do minimal tasks around the house and will have to sit because of being fatigued and short of breath. She reports that she does notice some discomfort with urination. Patient reports symptoms been ongoing for a while now. No other complaints, modifying factors or associated symptoms. Nursing notes reviewed.    Past Medical History:   Diagnosis Date    Chest pain NEC 7/04    negative GXT/cardiolite    Depression     1997    Hyperlipemia     Routine health maintenance     GYN--TAC(6/07)     Past Surgical History:   Procedure Laterality Date    APPENDECTOMY      COLONOSCOPY  08/03/2017    diverticulosis    DILATION AND CURETTAGE OF UTERUS      EPIDURAL STEROID INJECTION Right 6/24/2019    RIGHT LUMBAR TWO AND RIGHT LUMBAR FIVE SACRAL ONE EPIDURAL STEROID INJECTION SITE CONFIRMED BY FLUOROSCOPY performed by Joseph Landaverde MD at [address] Right 12/3/2019    RIGHT LUMBAR TWO LUMBAR THREE TRANSFORMINAL EPIDURAL STEROID INJECTION AND RIGHT LUMBAR FIVE SACRAL ONE EPIDURAL STEROID INJECTION SITE CONFIRMED BY FLUOROSCOPY performed by Margarette Massey MD at [facility] Right 10/17/2017    ROBOTIC, RIGHT INGUINAL HERNIA REPAIR WITH MESH    ROTATOR CUFF REPAIR  2002, 3/2011    MAURA AND BSO (CERVIX REMOVED)  07[facility]2016    [facility]     Family History   Problem Relation Age of Onset    Diabetes Father     Arthritis Sister      Social History     Socioeconomic History    Marital status:      Spouse name: Not on file    Number of children: Not on file    Years of education: Not on file    Highest education level: Not on file   Occupational History    Not on file   Tobacco Use    Smoking status: Never    Smokeless tobacco: Never   Substance and Sexual Activity    Alcohol use: Yes     Comment: social    Drug use: No    Sexual activity: Not on file   Other Topics Concern    Not on file   Social History Narrative    Not on file     Social Determinants of Health     Financial Resource Strain: Not on file   Food Insecurity: Not on file   Transportation Needs: Not on file   Physical Activity: Inactive    Days of Exercise per Week: 0 days    Minutes of Exercise per Session: 0 min   Stress: Not on file   Social Connections: Not on file   Intimate Partner Violence: Not on file   Housing Stability: Not on file     Current Outpatient Medications   Medication Sig Dispense Refill    chlorthalidone (HYGROTON) 25 MG tablet TAKE 1 TABLET BY MOUTH  DAILY 90 tablet 3    metFORMIN (GLUCOPHAGE-XR) 500 mg extended release tablet TAKE 1 TABLET BY MOUTH  TWICE DAILY 180 tablet 3    meloxicam (MOBIC) 15 MG tablet TAKE 1 TABLET BY MOUTH  DAILY 90 tablet 3    montelukast (SINGULAIR) 10 MG tablet TAKE 1 TABLET BY MOUTH AT  NIGHT 90 tablet 3    atorvastatin (LIPITOR) 20 MG tablet Take 1 tablet by mouth daily 90 tablet 3    SYMBICORT 160-4.5 MCG/ACT AERO USE 2 INHALATIONS BY MOUTH  TWICE DAILY 30.6 g 3    valsartan (DIOVAN) 160 MG tablet TAKE 1 TABLET BY MOUTH  DAILY 90 tablet 3 effort is normal.      Breath sounds: Normal breath sounds. Abdominal:      General: Bowel sounds are normal.      Palpations: Abdomen is soft. Neurological:      Mental Status: She is alert. ASSESSMENT/PLAN:   1. Viral URI  Patient presents today with complaints of cough, congestion, sore throat, fatigue, shortness of breath upon exertion. Patient reports that she has noticed the fatigue and shortness of breath upon exertion for a while now but symptoms worsened over the past week. Patient reports that her symptoms started on Tuesday and COVID test on Friday was negative. Patient reports taking Mucinex but not on a daily basis. Patient has known history of asthma. Patient reports using Symbicort inhaler as prescribed. Patient also has duo nebs but reports that she has not used them. I did recommend that patient continue taking Mucinex daily as well as using her inhalers and DuoNeb treatments. Patient encouraged to drink plenty of fluids. We did discuss possibility of COVID versus viral infection. No clinical indication at this time for antibiotics. Vital signs are stable. Patient afebrile. I did recommend that patient follow-up with pulmonologist for further work-up such as pulmonary function studies due to progressive dyspnea upon exertion over the past several weeks to months. Patient verbalized and acknowledges. Referral placed patient encouraged to call and schedule appointment. We will follow-up with results. 2. Dyspnea on exertion  Above #1  - Latrell Kaiser MD, Pulmonary, [facility]    3. Urinary urgency  Patient reports some urinary urgency and discomfort with urination. Patient unsure of when symptoms started. Patient denies any hematuria. Urinalysis obtained in the office for further evaluation.  - POCT Urinalysis no Micro       The note was completed using Dragon voice recognition transcription.  Every effort was made to ensure accuracy; however, inadvertent transcription errors may be present despite my best efforts to edit errors.     GIULIANO Lee - CNP

## 2022-07-18 NOTE — PATIENT INSTRUCTIONS
Please read the healthy family handout that you were given and share it with your family. Please compare this printed medication list with your medications at home to be sure they are the same. If you have any medications that are different please contact us immediately at [phone]. Also review your allergies that we have listed, these may also include medications that you have not been able to tolerate, make sure everything listed is correct. If you have any allergies that are different please contact us immediately at [phone]. You may receive a survey in the mail or by email asking about your experience during your visit today. Please complete and return to us so we know how we are serving you.

## 2022-07-21 LAB
ORGANISM: ABNORMAL
URINE CULTURE, ROUTINE: ABNORMAL

## 2022-07-26 ENCOUNTER — TELEPHONE (OUTPATIENT)
Dept: FAMILY MEDICINE CLINIC | Age: 77
End: 2022-07-26

## 2022-07-26 RX ORDER — CEPHALEXIN 500 MG/1
CAPSULE ORAL
Qty: 28 CAPSULE | Refills: 0 | Status: SHIPPED | OUTPATIENT
Start: 2022-07-26

## 2022-07-26 NOTE — TELEPHONE ENCOUNTER
Reviewed patient's urine culture report.   Please change antibiotic to Keflex 500 mg 4 times a day for 7 days

## 2022-07-26 NOTE — TELEPHONE ENCOUNTER
Patient was seen last week with uti symptoms, she was put on Macrobid. She said today will be day 6 of medication but symptoms have not really improved much. She is now having low back pain and said she felt terrible yesterday couldn't hardly get up and move around.   Would you want to switch to different abx or does she need seen again

## 2022-08-08 ENCOUNTER — TELEPHONE (OUTPATIENT)
Dept: FAMILY MEDICINE CLINIC | Age: 77
End: 2022-08-08

## 2022-10-03 ENCOUNTER — NURSE ONLY (OUTPATIENT)
Dept: FAMILY MEDICINE CLINIC | Age: 77
End: 2022-10-03
Payer: MEDICARE

## 2022-10-03 ENCOUNTER — TELEPHONE (OUTPATIENT)
Dept: FAMILY MEDICINE CLINIC | Age: 77
End: 2022-10-03

## 2022-10-03 DIAGNOSIS — N39.0 URINARY TRACT INFECTION WITHOUT HEMATURIA, SITE UNSPECIFIED: Primary | ICD-10-CM

## 2022-10-03 DIAGNOSIS — N30.00 ACUTE CYSTITIS WITHOUT HEMATURIA: Primary | ICD-10-CM

## 2022-10-03 PROCEDURE — 81003 URINALYSIS AUTO W/O SCOPE: CPT | Performed by: NURSE PRACTITIONER

## 2022-10-03 RX ORDER — NITROFURANTOIN 25; 75 MG/1; MG/1
100 CAPSULE ORAL 2 TIMES DAILY
Qty: 20 CAPSULE | Refills: 0 | Status: SHIPPED | OUTPATIENT
Start: 2022-10-03 | End: 2022-10-05 | Stop reason: ALTCHOICE

## 2022-10-05 LAB
ORGANISM: ABNORMAL
URINE CULTURE, ROUTINE: ABNORMAL

## 2022-10-05 RX ORDER — CIPROFLOXACIN 250 MG/1
250 TABLET, FILM COATED ORAL 2 TIMES DAILY
Qty: 14 TABLET | Refills: 0 | Status: SHIPPED | OUTPATIENT
Start: 2022-10-05 | End: 2022-10-12

## 2022-10-07 ENCOUNTER — TELEPHONE (OUTPATIENT)
Dept: PULMONOLOGY | Age: 77
End: 2022-10-07

## 2022-10-07 NOTE — TELEPHONE ENCOUNTER
Patient cancelled appointment on 10/27/22 with Dr. Alejandra Valderrama for referred by neelima Falcon Dyspnea on exertion    Reason: pt no longer having issue would like to cancel. Patient did not reschedule appointment. Appointment rescheduled for does not want to r/s at this time.

## 2022-10-18 ENCOUNTER — HOSPITAL ENCOUNTER (OUTPATIENT)
Dept: WOMENS IMAGING | Age: 77
Discharge: HOME | End: 2022-10-18
Payer: MEDICARE

## 2022-10-18 DIAGNOSIS — M85.851 OSTEOPENIA OF BOTH HIPS: ICD-10-CM

## 2022-10-18 DIAGNOSIS — M85.852 OSTEOPENIA OF BOTH HIPS: ICD-10-CM

## 2022-10-18 PROCEDURE — 77080 DXA BONE DENSITY AXIAL: CPT

## 2022-11-23 ENCOUNTER — TELEPHONE (OUTPATIENT)
Dept: CARDIOLOGY CLINIC | Age: 77
End: 2022-11-23

## 2022-11-23 RX ORDER — VALSARTAN 160 MG/1
160 TABLET ORAL DAILY
Qty: 90 TABLET | Refills: 3 | Status: SHIPPED | OUTPATIENT
Start: 2022-11-23

## 2022-11-23 RX ORDER — MONTELUKAST SODIUM 10 MG/1
TABLET ORAL
Qty: 90 TABLET | Refills: 3 | Status: SHIPPED | OUTPATIENT
Start: 2022-11-23

## 2022-11-23 RX ORDER — ATORVASTATIN CALCIUM 20 MG/1
20 TABLET, FILM COATED ORAL DAILY
Qty: 90 TABLET | Refills: 3 | Status: SHIPPED | OUTPATIENT
Start: 2022-11-23

## 2022-11-23 NOTE — TELEPHONE ENCOUNTER
11/23 called and spoke to pt. Pt stated that she is currently in [place] and doesn't know when she will be back. Pt states that she will call to schedule appt when she finds out when they are returning back from [place].

## 2022-11-23 NOTE — TELEPHONE ENCOUNTER
Please call patient to schedule one year follow up with [facility]. Refills for Lipitor sent to pharmacy.

## 2022-11-30 RX ORDER — MELOXICAM 15 MG/1
TABLET ORAL
Qty: 90 TABLET | Refills: 3 | Status: SHIPPED | OUTPATIENT
Start: 2022-11-30

## 2023-01-24 RX ORDER — BUDESONIDE AND FORMOTEROL FUMARATE DIHYDRATE 160; 4.5 UG/1; UG/1
AEROSOL RESPIRATORY (INHALATION)
Qty: 30.6 G | Refills: 3 | OUTPATIENT
Start: 2023-01-24

## 2023-06-05 RX ORDER — METFORMIN HYDROCHLORIDE 500 MG/1
TABLET, EXTENDED RELEASE ORAL
Qty: 180 TABLET | Refills: 3 | OUTPATIENT
Start: 2023-06-05

## 2023-06-05 RX ORDER — CHLORTHALIDONE 25 MG/1
TABLET ORAL
Qty: 90 TABLET | Refills: 3 | OUTPATIENT
Start: 2023-06-05

## 2023-06-05 NOTE — TELEPHONE ENCOUNTER
Future appt scheduled 0 appt scheduled   Return in about 6 months (around 10/25/2022),                         Last appt 07/18/2022      Last Written     chlorthalidone (HYGROTON) 25 MG tablet  06/009/2022  #90  3 RF       metFORMIN (GLUCOPHAGE-XR) 500 mg extended release tablet  06/09/2022  #180  3 RF

## 2023-07-10 RX ORDER — BUDESONIDE AND FORMOTEROL FUMARATE DIHYDRATE 160; 4.5 UG/1; UG/1
AEROSOL RESPIRATORY (INHALATION)
Qty: 30.6 G | Refills: 3 | OUTPATIENT
Start: 2023-07-10

## 2023-08-14 RX ORDER — METFORMIN HYDROCHLORIDE 500 MG/1
TABLET, EXTENDED RELEASE ORAL
Qty: 180 TABLET | Refills: 3 | OUTPATIENT
Start: 2023-08-14

## 2023-10-08 RX ORDER — VALSARTAN 160 MG/1
160 TABLET ORAL DAILY
Qty: 90 TABLET | Refills: 3 | OUTPATIENT
Start: 2023-10-08

## 2023-10-08 RX ORDER — MONTELUKAST SODIUM 10 MG/1
TABLET ORAL
Qty: 90 TABLET | Refills: 3 | OUTPATIENT
Start: 2023-10-08

## 2023-10-09 RX ORDER — ATORVASTATIN CALCIUM 20 MG/1
20 TABLET, FILM COATED ORAL DAILY
Qty: 90 TABLET | Refills: 3 | OUTPATIENT
Start: 2023-10-09

## 2023-10-09 NOTE — TELEPHONE ENCOUNTER
10/14/2021 [facility]  No upcoming appt    On 11/23/2022-called and spoke to pt. Pt stated that she is currently in [place] and doesn't know when she will be back. Pt states that she will call to schedule appt when she finds out when they are returning back from [place]. Please contact pt for yearly appt.

## 2023-10-09 NOTE — TELEPHONE ENCOUNTER
10/09 called and spoke w/ pt @ [phone] and she stated appt isnt necessary, pt stated she will call pcp to see if they will manage lipitor refills.

## 2023-11-16 RX ORDER — MELOXICAM 15 MG/1
TABLET ORAL
Qty: 90 TABLET | Refills: 3 | OUTPATIENT
Start: 2023-11-16

## 2024-01-26 RX ORDER — MELOXICAM 15 MG/1
TABLET ORAL
Qty: 90 TABLET | Refills: 3 | OUTPATIENT
Start: 2024-01-26

## 2024-06-10 NOTE — TELEPHONE ENCOUNTER
6/10- called pt @[phone]. Lv informing pt to call back to schedule annual appt with [facility] for continuation of med refills.     10/14/2021 [facility]

## 2024-06-10 NOTE — TELEPHONE ENCOUNTER
Last OV 10/14/2021  NO Upcoming OV  LIPID 08/2021  LFT 04/2022    Per encounter on 10/08/2023    On 11/23/2022-called and spoke to pt. Pt stated that she is currently in [place] and doesn't know when she will be back. Pt states that she will call to schedule appt when she finds out when they are returning back from [place].       FRONT-pls contact pt for yearly apt

## 2024-06-12 RX ORDER — ATORVASTATIN CALCIUM 20 MG/1
20 TABLET, FILM COATED ORAL DAILY
Qty: 90 TABLET | Refills: 3 | OUTPATIENT
Start: 2024-06-12

## 2024-06-12 NOTE — TELEPHONE ENCOUNTER
6/12- called pt @[phone]. Pt stated she does not think she needs to be seen anymore. I informed pt of the med request we received for atorvastatin (lipitor) pt stated she does not take that. Pt stated she saw a cardiologist in [place] and had a stress test and everything was fine.

## 2024-07-17 ENCOUNTER — HOSPITAL ENCOUNTER (OUTPATIENT)
Dept: PHYSICAL THERAPY | Age: 79
Setting detail: THERAPIES SERIES
Discharge: HOME | End: 2024-07-17
Payer: MEDICARE

## 2024-07-17 PROCEDURE — 97161 PT EVAL LOW COMPLEX 20 MIN: CPT

## 2024-07-17 PROCEDURE — 97110 THERAPEUTIC EXERCISES: CPT

## 2024-07-17 NOTE — PLAN OF CARE
[facility]- Outpatient Rehabilitation and Therapy [address] [place] [address], [address], [place] [address] office: [phone] fax: [phone]     Physical Therapy Initial Evaluation Certification      Dear ROSEMARY Garza MD,    We had the pleasure of evaluating the following patient for physical therapy services at [facility].  A summary of our findings can be found in the initial assessment below.  This includes our plan of care.  If you have any questions or concerns regarding these findings, please do not hesitate to contact me at the office phone number listed above.  Thank you for the referral.     Physician Signature:_______________________________Date:__________________  By signing above (or electronic signature), therapist’s plan is approved by physician       Physical Therapy: TREATMENT/PROGRESS NOTE   Patient: Milka Darden (79 y.o. 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

## 2024-07-23 ENCOUNTER — HOSPITAL ENCOUNTER (OUTPATIENT)
Dept: PHYSICAL THERAPY | Age: 79
Setting detail: THERAPIES SERIES
Discharge: HOME | End: 2024-07-23
Payer: MEDICARE

## 2024-07-23 PROCEDURE — 97110 THERAPEUTIC EXERCISES: CPT

## 2024-07-23 PROCEDURE — 97140 MANUAL THERAPY 1/> REGIONS: CPT

## 2024-07-23 NOTE — FLOWSHEET NOTE
[facility]- Outpatient Rehabilitation and Therapy [address] [place] [address], [address], [place] [address] office: [phone] fax: [phone]         Physical Therapy: TREATMENT/PROGRESS NOTE   Patient: Milka Darden (79 y.o. 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  No further screening warranted    Preferred Language for Healthcare:   [x] English       [] other:    SUBJECTIVE EXAMINATION     Patient stated complaint:  reports she is a little better 2-3/10.  Still really stiff with turning, not hurting as much at night (taking muscle relaxer)      24 Patient reports insidious onset of neck pain that started about one month ago after she had been sick with a sinus infection.  It has gotten progressively worse.  PCP referred her to Dr. Garza who took xrays as noted below.  He prescribed her a muscle relaxer and medrol dose pack.  Medication has

## 2024-07-30 ENCOUNTER — HOSPITAL ENCOUNTER (OUTPATIENT)
Dept: PHYSICAL THERAPY | Age: 79
Setting detail: THERAPIES SERIES
Discharge: HOME | End: 2024-07-30
Payer: MEDICARE

## 2024-07-30 ENCOUNTER — HOSPITAL ENCOUNTER (OUTPATIENT)
Dept: PHYSICAL THERAPY | Age: 79
Setting detail: THERAPIES SERIES
End: 2024-07-30
Payer: MEDICARE

## 2024-07-30 PROCEDURE — 97110 THERAPEUTIC EXERCISES: CPT

## 2024-07-30 PROCEDURE — 97140 MANUAL THERAPY 1/> REGIONS: CPT

## 2024-07-30 NOTE — FLOWSHEET NOTE
walking, bending, lifting, catching, throwing, pushing, pulling, jumping.)  Direct, one on one contact, billed in 15-minute increments.  (15886) MANUAL THERAPY -  Manual therapy techniques, 1 or more regions, each 15 minutes (Mobilization/manipulation, manual lymphatic drainage, manual traction) for the purpose of modulating pain, promoting relaxation,  increasing ROM, reducing/eliminating soft tissue swelling/inflammation/restriction, improving soft tissue extensibility and allowing for proper ROM for normal function with self care, mobility, lifting and ambulation  (30589) Needle insertion(s) without injection; 1 or 2 muscle(s).  (20561) Needle insertion(s) without injection; 3 or more muscle(s)    GOALS     Patient stated goal: sleep through the night without waking due to pain.  [] Progressing: [] Met: [] Not Met: [] Adjusted    Therapist goals for Patient:   Short Term Goals: To be achieved in: 2 weeks  1. Independent in HEP and progression per patient tolerance, in order to prevent re-injury.   [] Progressing: [] Met: [] Not Met: [] Adjusted  2. Patient will have a decrease in pain to <2/10 to facilitate improvement in movement, function, and ADLs as indicated by Functional Deficits.  [] Progressing: [] Met: [] Not Met: [] Adjusted    Long Term Goals: To be achieved in: 8 weeks  1. Disability index score of NDI% or less for the Neck Disability Index to assist with reaching prior level of function with activities such as reaching into a cabinet.  [] Progressing: [] Met: [] Not Met: [] Adjusted  2. Patient will demonstrate increased AROM of cervical spine  to WNL without pain to allow for proper joint functioning to enable patient to drive a car without limitations in her ROM.   [] Progressing: [] Met: [] Not Met: [] Adjusted  3. Patient will demonstrate increased Strength of B UE's to at least 4+/5 throughout without pain to allow for proper functional mobility to enable patient to return to household chores.

## 2024-08-01 ENCOUNTER — APPOINTMENT (OUTPATIENT)
Dept: PHYSICAL THERAPY | Age: 79
End: 2024-08-01
Payer: MEDICARE

## 2024-08-05 ENCOUNTER — HOSPITAL ENCOUNTER (OUTPATIENT)
Dept: PHYSICAL THERAPY | Age: 79
Setting detail: THERAPIES SERIES
Discharge: HOME | End: 2024-08-05
Payer: MEDICARE

## 2024-08-05 PROCEDURE — 97140 MANUAL THERAPY 1/> REGIONS: CPT

## 2024-08-05 PROCEDURE — 97110 THERAPEUTIC EXERCISES: CPT

## 2024-08-05 NOTE — FLOWSHEET NOTE
[facility]- Outpatient Rehabilitation and Therapy [facility], [address], [place] [address] office: [phone] fax: [phone]         Physical Therapy: TREATMENT/PROGRESS NOTE   Patient: Milka Darden (79 y.o. 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  No further screening warranted    Preferred Language for Healthcare:   [x] English       [] other:    SUBJECTIVE EXAMINATION     Patient stated complaint:  reports she took ibuprofen instead of her meloxicam and it helped.  Pt may want to try dry needling on her next appt with PT.  It is a bit better in the AM now.  Overall less tenderness noted.  Discomfort noted L upper cervical (may benefit from Rot mobs)      24 Patient reports insidious onset of neck pain that started about one month ago after she had been sick with a sinus infection.  It has gotten progressively worse.  PCP

## 2024-08-07 ENCOUNTER — APPOINTMENT (OUTPATIENT)
Dept: PHYSICAL THERAPY | Age: 79
End: 2024-08-07
Payer: MEDICARE

## 2024-08-07 ENCOUNTER — HOSPITAL ENCOUNTER (OUTPATIENT)
Dept: PHYSICAL THERAPY | Age: 79
Setting detail: THERAPIES SERIES
Discharge: HOME | End: 2024-08-07
Payer: MEDICARE

## 2024-08-07 PROCEDURE — 97140 MANUAL THERAPY 1/> REGIONS: CPT

## 2024-08-07 NOTE — FLOWSHEET NOTE
[facility]- Outpatient Rehabilitation and Therapy [facility], [address], [place] [address] office: [phone] fax: [phone]         Physical Therapy: TREATMENT/PROGRESS NOTE   Patient: Milka Darden (79 y.o. 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  No further screening warranted    Preferred Language for Healthcare:   [x] English       [] other:    SUBJECTIVE EXAMINATION     Patient stated complaint:    pt late today       reports still doing better overall     Pt may want to try dry needling on her next appt with PT. .  Overall less tenderness noted.  Discomfort noted L upper cervical (may benefit from Rot mobs)      24 Patient reports insidious onset of neck pain that started about one month ago after she had been sick with a sinus infection.  It has gotten progressively worse.  PCP referred her to Dr. Garza who took xrays

## 2024-08-13 ENCOUNTER — HOSPITAL ENCOUNTER (OUTPATIENT)
Dept: PHYSICAL THERAPY | Age: 79
Setting detail: THERAPIES SERIES
Discharge: HOME | End: 2024-08-13
Payer: MEDICARE

## 2024-08-13 PROCEDURE — 20560 NDL INSJ W/O NJX 1 OR 2 MUSC: CPT

## 2024-08-13 PROCEDURE — 97140 MANUAL THERAPY 1/> REGIONS: CPT

## 2024-08-13 NOTE — FLOWSHEET NOTE
code: [id number]        ASSESSMENT       Today's Assessment: Patient had good tolerance to today's session, reporting improved ROM with program completed. Unable to progress  exercise difficulty on next visit. Continues to display deficits in stiffness, weakness, and decreased NM control which required ongoing skilled physical therapy and decision making.   Tolerated treatment well without adverse reactions noted.  Pain decreased to 0/10 after treatment with only soreness noted in B UT, and with improved ROM.     Medical Necessity Documentation:  I certify that this patient meets the below criteria necessary for medical necessity for care and/or justification of therapy services:  The patient has functional impairments and/or activity limitations and would benefit from continued outpatient therapy services to address the deficits outlined in the patients goals  The patient has a musculoskeletal condition(s) with a corresponding ICD-10 code that is of complexity and severity that require skilled therapeutic intervention. This has a direct and significant impact on the need for therapy and significantly impacts the rate of recovery.   The patient has a complexity identified by an ICD-10 code that has a direct and significant impact on the need for therapy.  (Significantly impacts the rate of recovery and is associated with a primary condition.)     Return to Play: NA    Prognosis for POC: [x] Good [] Fair  [] Poor    Patient requires continued skilled intervention: [x] Yes  [] No      CHARGE CAPTURE     PT CHARGE GRID   CPT Code (TIMED) minutes # CPT Code (UNTIMED) #     Therex (77511)     EVAL:LOW (11986 - Typically 20 minutes face-to-face)     Neuromusc. Re-ed (57437)    Re-Eval (64456)     Manual (62925) 30 2  Estim Unattended (56168)     Ther. Act (36765)    [facility] Traction (75493)     Gait (27203)    Dry Needle 1-2 muscle (19895) 1    Aquatic Therex (06510)    Dry Needle 3+ muscle (20561)     Iontophoresis (78880)

## 2024-08-16 ENCOUNTER — HOSPITAL ENCOUNTER (OUTPATIENT)
Dept: PHYSICAL THERAPY | Age: 79
Setting detail: THERAPIES SERIES
Discharge: HOME | End: 2024-08-16
Payer: MEDICARE

## 2024-08-16 PROCEDURE — 97530 THERAPEUTIC ACTIVITIES: CPT

## 2024-08-16 PROCEDURE — 97140 MANUAL THERAPY 1/> REGIONS: CPT

## 2024-08-16 NOTE — FLOWSHEET NOTE
associated with a primary condition.)     Return to Play: NA    Prognosis for POC: [x] Good [] Fair  [] Poor    Patient requires continued skilled intervention: [] Yes  [x] No      CHARGE CAPTURE     PT CHARGE GRID   CPT Code (TIMED) minutes # CPT Code (UNTIMED) #     Therex (39084)     EVAL:LOW (57004 - Typically 20 minutes face-to-face)     Neuromusc. Re-ed (86879)    Re-Eval (03905)     Manual (19127) 40 3  Estim Unattended (85250)     Ther. Act (33290) 15 1  Mech. Traction (55732)     Gait (04594)    Dry Needle 1-2 muscle (82077)     Aquatic Therex (62643)    Dry Needle 3+ muscle (20561)     Iontophoresis (32786)    VASO (84626)     Ultrasound (16161)    Group Therapy (04611)     Estim Attended (60524)    Canalith Repositioning (20545)     Other:    Other:    Total Timed Code Tx Minutes 55 4       Total Treatment Minutes 55        Charge Justification:  (00216) THERAPEUTIC EXERCISE - Provided verbal/tactile cueing for activities related to strengthening, flexibility, endurance, ROM performed to prevent loss of range of motion, maintain or improve muscular strength or increase flexibility, following either an injury or surgery.   (22026) HOME EXERCISE PROGRAM - Reviewed/Progressed HEP activities related to strengthening, flexibility, endurance, ROM performed to prevent loss of range of motion, maintain or improve muscular strength or increase flexibility, following either an injury or surgery.  (09313) NEUROMUSCULAR RE-EDUCATION - Therapeutic procedure, 1 or more areas, each 15 minutes; neuromuscular reeducation of movement, balance, coordination, kinesthetic sense, posture, and/or proprioception for sitting and/or standing activities  (05930) HOME EXERCISE PROGRAM - Reviewed/Progressed HEP activities related to neuromuscular reeducation of movement, balance, coordination, kinesthetic sense, posture, and/or proprioception for sitting and/or standing activities    (76547) THERAPEUTIC ACTIVITY - use of dynamic

## 2024-09-27 ENCOUNTER — HOSPITAL ENCOUNTER (OUTPATIENT)
Dept: PHYSICAL THERAPY | Age: 79
Setting detail: THERAPIES SERIES
Discharge: HOME | End: 2024-09-27
Payer: MEDICARE

## 2024-09-27 PROCEDURE — 97140 MANUAL THERAPY 1/> REGIONS: CPT

## 2024-09-27 PROCEDURE — 97161 PT EVAL LOW COMPLEX 20 MIN: CPT

## 2024-09-27 PROCEDURE — 97110 THERAPEUTIC EXERCISES: CPT

## 2024-10-02 ENCOUNTER — HOSPITAL ENCOUNTER (OUTPATIENT)
Dept: PHYSICAL THERAPY | Age: 79
Setting detail: THERAPIES SERIES
Discharge: HOME | End: 2024-10-02
Payer: MEDICARE

## 2024-10-02 PROCEDURE — 97110 THERAPEUTIC EXERCISES: CPT

## 2024-10-02 PROCEDURE — 20560 NDL INSJ W/O NJX 1 OR 2 MUSC: CPT

## 2024-10-02 PROCEDURE — 97140 MANUAL THERAPY 1/> REGIONS: CPT

## 2024-10-02 NOTE — FLOWSHEET NOTE
5 5     Cerv extension 5 5     Cerv SB 5 5     Cerv rotation 4 4        SHOULDER Flexion 3+ 3-     Abduction 3+ 3-     Ext  4 4     ER -90 3+ 3+     IR -0       IR -90 3+ 3+      ELBOW Flex/biceps 4 4     Ext/triceps 4 4     Pronation 5 5     supination 5 5        WRIST Flexion 5 5     Extension 5 5     RD       UD                 Exercises/Interventions     Therapeutic Ex (82268)  resistance Sets/time Reps Notes/Cues/Progressions   Axial elongation   10    Scap retraction/depression   10    Seated UT stretch  30 sec 2    Seated upper thoracic stretch  30 sec 2    Bicep curls with good posture   10    B shoulder flex and abd  2#:1# 10 ea Same code   Cerv deni rot  5\" 5 B Same code                        Manual Intervention (11108)  TIME     SOR  STM and TPR to B UT, levator scap, SCM, scalenes, and cervical paraspinals  Manual cervical traction  Manual UT stretch  Manual MFR upper cervical stretch  Inf first rib mobs  X     Dry needling manual therapy: consisted on the placement of 4 needles in the following muscles:  B UT.  A 30 mm needle was inserted, piston, rotated, and coned to produce intramuscular mobilization.  These techniques were used to restore functional range of motion, reduce muscle spasm and induce healing in the corresponding musculature by means of intramuscular mobilization. (28844)  Clean Technique was utilized today while applying Dry needling treatment.  The treatment sites where cleaned with 70% solution of  isopropyl alcohol .  The PT washed their hands and utilized treatment gloves along with hand  prior to inserting the needles.  All needles where removed and discarded in the appropriate sharps container.    Consent signed 9/27/24 and precautions addressed.  See media tab.  MD has given verbal and/or written approval for this treatment.    ** Educated patient on anatomy, trigger point etiology, expectations for TDN (bruising, soreness, etc), outcomes, and recommendations for

## 2024-10-09 ENCOUNTER — HOSPITAL ENCOUNTER (OUTPATIENT)
Dept: PHYSICAL THERAPY | Age: 79
Setting detail: THERAPIES SERIES
Discharge: HOME | End: 2024-10-09
Payer: MEDICARE

## 2024-10-09 PROCEDURE — 97110 THERAPEUTIC EXERCISES: CPT

## 2024-10-09 PROCEDURE — 97140 MANUAL THERAPY 1/> REGIONS: CPT

## 2024-10-09 NOTE — FLOWSHEET NOTE
has gotten progressively worse.  PCP referred her to Dr. Garza who took xrays as noted below.  He prescribed her a muscle relaxer and medrol dose pack.  Medication has helped a lot with her pain.  She was referred to PT treatment where she attended 7 visits with slight improvement noted.  No radiating pain or N/T into B UE's.  R hand dominant.  She returned to Dr. Garza on 9/18/24 to go over MRI results as noted below.  She is referred to PT treatment again as patient doesn't want to get an epidural.  She takes meloxicam daily and tylenol arthritis PRN.  She gets a lot of relief from ibuprofen.      9/12/24 MRI cervical spine images/report independently reviewed.  IMPRESSION:  Multilevel cervical spondylosis and facet disease as detailed. No high-grade central canal  stenosis. Moderate left foraminal narrowing at C4-5, moderate left and mild-to-moderate right  foraminal narrowing at C6-7. See the above detailed level by level discussion.    7/15/24 x-ray cervical spine- Cervical x-rays from her PCP CD-ROM personally reviewed showing advanced C1 to arthritis with , multilevel advanced degenerative changes with C4-5 spondylolisthesis    7/15/2024 2 views cervical spine flexion-extension show again multilevel degenerative changes with mild translation by 1 to 2 mm with flexion extension at C4-5.      Test used Initial score  9/27/24 10/09/2024   Pain Summary VAS 0-1/10 mid cervical region  5/10 at worst 1/10 currently, 2-3/10 at worst since last visit   Functional questionnaire Neck Disability Index 10%    Other:                OBJECTIVE EXAMINATION     7/17/24  ROM/Strength: (Blank cells denote NT)    Mvmt (norm) AROM L AROM R Notes PROM L PROM R Notes     CERVICAL Flex (60) 58       Ext (70) 50       SB(45) 40 30        Rotation (80) 45* 45*           SHOULDER Flexion (180) 145 120        Abduction (180) 165 110        Ext  65 64        ER -90 (90) T4 T2        IR -0 T8 L3        IR -90 (70)           ELBOW

## 2024-10-16 ENCOUNTER — HOSPITAL ENCOUNTER (OUTPATIENT)
Dept: PHYSICAL THERAPY | Age: 79
Setting detail: THERAPIES SERIES
Discharge: HOME | End: 2024-10-16
Payer: MEDICARE

## 2024-10-16 PROCEDURE — 97140 MANUAL THERAPY 1/> REGIONS: CPT

## 2024-10-16 PROCEDURE — 20561 NDL INSJ W/O NJX 3+ MUSC: CPT

## 2024-10-16 NOTE — FLOWSHEET NOTE
to allow for proper joint functioning to enable patient to drive a car without limitations in her ROM.   [] Progressing: [] Met: [] Not Met: [] Adjusted  3. Patient will demonstrate increased Strength of B UE's to at least 4+/5 throughout without pain to allow for proper functional mobility to enable patient to return to household chores.   [] Progressing: [] Met: [] Not Met: [] Adjusted  4. Patient will return to normal activities of reading and watching TV without increased symptoms or restriction.   [] Progressing: [] Met: [] Not Met: [] Adjusted  5. Decrease muscle tightness by 50% per patient report.  [] Progressing: [] Met: [] Not Met: [] Adjusted     Overall Progression Towards Functional goals/ Treatment Progress Update:  [] Patient is progressing as expected towards functional goals listed.    [] Progression is slowed due to complexities/Impairments listed.  [] Progression has been slowed due to co-morbidities.  [x] Plan just implemented, too soon (<30days) to assess goals progression   [] Goals require adjustment due to lack of progress  [] Patient is not progressing as expected and requires additional follow up with physician  [] Other:     TREATMENT PLAN     Frequency/Duration: at eval  1-2x/week for 8 weeks for the following treatment interventions:    Interventions:  Therapeutic Exercise (32248) including: strength training, ROM, and functional mobility  Therapeutic Activities (73145) including: functional mobility training and education.  Neuromuscular Re-education (23742) activation and proprioception, including postural re-education.    Manual Therapy (13048) as indicated to include: Passive Range of Motion, Gr I-IV mobilizations, Soft Tissue Mobilization, Dry Needling/IASTM, Trigger Point Release, and Myofascial Release  Modalities as needed that may include: Cryotherapy and Traction  Patient education on joint protection, postural re-education, activity modification, and progression of

## 2024-10-23 ENCOUNTER — HOSPITAL ENCOUNTER (OUTPATIENT)
Dept: PHYSICAL THERAPY | Age: 79
Setting detail: THERAPIES SERIES
Discharge: HOME | End: 2024-10-23
Payer: MEDICARE

## 2024-10-23 PROCEDURE — 97140 MANUAL THERAPY 1/> REGIONS: CPT

## 2024-10-23 PROCEDURE — 20561 NDL INSJ W/O NJX 3+ MUSC: CPT

## 2024-10-23 PROCEDURE — 97530 THERAPEUTIC ACTIVITIES: CPT

## 2024-10-23 NOTE — PLAN OF CARE
[facility] [address] [place] [address], [address], [place] [address] office: [phone] fax: [phone]      Physical Therapy Discharge Summary    Dear ROSEMARY Garza MD   ,    We had the pleasure of treating the following patient for physical therapy services at [facility].  A summary of our findings can be found in the discharge summary below.  If you have any questions or concerns regarding these findings, please do not hesitate to contact me at the office phone number checked above.  Thank you for the referral.       Functional Outcome: Neck Disability Index: 14 % disability    Total Visits: 12     Plan of Care: Discharge from Physical Therapy    Reason for Discharge:  No Subjective or Objective improvements / Plateaued and no longer responding to skilled intervention.       Physical Therapy: TREATMENT/PROGRESS NOTE   Patient: Milka Darden (79 y.o. 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

## 2025-02-18 NOTE — PATIENT INSTRUCTIONS
Patient Education        Allergies: Care Instructions  Your Care Instructions     Allergies occur when your body's defense system (immune system) overreacts to certain substances. The immune system treats a harmless substance as if it were a harmful germ or virus. Many things can make this happen. These include pollens, medicine, food, dust, animal dander, and mold. Allergies can be mild or severe. Mild allergies can be managed with home treatment. But medicine may be needed to prevent problems. Managing your allergies is an important part of staying healthy. Your doctor may suggest that you have allergy testing to help find out what is causing your allergies. Severe allergies can cause reactions that affect your whole body (anaphylactic reactions). Your doctor may prescribe a shot of epinephrine to carry with you in case you have a severe reaction. Learn how to give yourself the shot and keep it with you at all times. Make sure it is not . Follow-up care is a key part of your treatment and safety. Be sure to make and go to all appointments, and call your doctor if you are having problems. It's also a good idea to know your test results and keep a list of the medicines you take. How can you care for yourself at home? · If you have been told by your doctor that dust or dust mites are causing your allergy, decrease the dust around your bed:  ? Wash sheets, pillowcases, and other bedding in hot water every week. ? Use dust-proof covers for pillows, duvets, and mattresses. Avoid plastic covers because they tear easily and do not \"breathe. \" Wash as instructed on the label. ? Do not use any blankets and pillows that you do not need. ? Use blankets that you can wash in your washing machine. ? Consider removing drapes and carpets, which attract and hold dust, from your bedroom. · If you are allergic to house dust and mites, do not use home humidifiers.  Your doctor can suggest ways you can control dust Pediatric Neurosurgery  Established Patient    SUBJECTIVE:     History of Present Illness:  Ruby Pierre is a 22 m.o. male with hydrocephalus status post right parietal VPS (Strata @ 1.5) on 5/10/24.       Interval 2/18/25: Ayde returns to clinic after imaging for scheduled follow up.  His mother reports an episode on [place] where he slumped to the left and had eye fluttering w/ upgaze that lasted approximately 30 seconds.  He didn't respond to his name during this time and fell over soon afterward before returning to his neurological baseline.  He was diagnosed with RSV the next day but his mother is unsure if he had been febrile prior to the event.  His mother also reports regular episodes of increased fussiness and grabbing his right head.  On these days he has to lay down all day and is unable to participate.  Occasional vomiting with these episodes. This occurs at least once per week which is overall decreased compared to prior to shunt placement but has never fully gone away.  Also with frequent right ear infections- currently on amoxicillin.  No lethargy. He is in OT and ST but mom reports minimal progress.  He is scheduled for autism evaluation and she hopes he can join his older sister in [employer] school.        Review of patient's allergies indicates:  No Known Allergies    Current Outpatient Medications   Medication Sig Dispense Refill    acetaminophen (TYLENOL) 160 mg/5 mL Liqd Take 5.2 mLs (166.4 mg total) by mouth every 4 (four) hours as needed (Pain or Temperature >100.4).      ibuprofen 20 mg/mL oral liquid Take 5.6 mLs (112 mg total) by mouth every 8 (eight) hours as needed for Pain or Temperature greater than (100.4).      ibuprofen 20 mg/mL oral liquid Take 6.1 mLs (122 mg total) by mouth every 6 (six) hours as needed for Pain (fever). 237 mL 1    ondansetron (ZOFRAN-ODT) 4 MG TbDL TAKE 1/2 TABLET EVERY 12 HRS AS NEEDED FOR NAUSEA/VOMITING 20 tablet 0     No current  and mites. · Look for signs of cockroaches. Cockroaches cause allergic reactions. Use cockroach baits to get rid of them. Then, clean your home well. Cockroaches like areas where grocery bags, newspapers, empty bottles, or cardboard boxes are stored. Do not keep these inside your home, and keep trash and food containers sealed. Seal off any spots where cockroaches might enter your home. · If you are allergic to mold, get rid of furniture, rugs, and drapes that smell musty. Check for mold in the bathroom. · If you are allergic to outdoor pollen or mold spores, use air-conditioning. Change or clean all filters every month. Keep windows closed. · If you are allergic to pollen, stay inside when pollen counts are high. Use a vacuum  with a HEPA filter or a double-thickness filter at least two times each week. · Stay inside when air pollution is bad. Avoid paint fumes, perfumes, and other strong odors. · Avoid conditions that make your allergies worse. Stay away from smoke. Do not smoke or let anyone else smoke in your house. Do not use fireplaces or wood-burning stoves. · If you are allergic to your pets, change the air filter in your furnace every month. Use high-efficiency filters. · If you are allergic to pet dander, keep pets outside or out of your bedroom. Old carpet and cloth furniture can hold a lot of animal dander. You may need to replace them. When should you call for help? Give an epinephrine shot if:    · You think you are having a severe allergic reaction.     · You have symptoms in more than one body area, such as mild nausea and an itchy mouth. After giving an epinephrine shot call 911, even if you feel better. Call 911 if:    · You have symptoms of a severe allergic reaction. These may include:  ? Sudden raised, red areas (hives) all over your body. ? Swelling of the throat, mouth, lips, or tongue. ? Trouble breathing. ? Passing out (losing consciousness).  Or you may feel very facility-administered medications for this visit.       Past Medical History:   Diagnosis Date    Hydrocephalus      Past Surgical History:   Procedure Laterality Date    CIRCUMCISION      COMPUTED TOMOGRAPHY N/A 4/15/2024    Procedure: Ct scan;  Surgeon: Sonia Andre;  Location: [facility];  Service: Anesthesiology;  Laterality: N/A;    VENTRICULOPERITONEAL SHUNT Right 5/10/2024    Procedure: INSERTION, SHUNT, VENTRICULOPERITONEAL;  Surgeon: Sirena Amaya MD;  Location: [facility] OR [address] [place] [address];  Service: Neurosurgery;  Laterality: Right;  BED: REGULAR  HEAD REST: HORSE SHOE  STEALTH     Family History       Problem Relation (Age of Onset)    Anemia Mother    Anesthesia problems Maternal Grandmother    Arthritis Maternal Grandmother    Asthma Mother    Diabetes Maternal Grandmother    Heart disease Maternal Grandmother    Hyperlipidemia Maternal Grandmother    Hypertension Maternal Grandmother    Kidney disease Mother    Liver disease Mother, Paternal Grandfather    Mental illness Mother    No Known Problems Father, Maternal Grandfather, Paternal Grandmother    Pulmonary embolism Maternal Grandmother    Rashes / Skin problems Mother    Seizures Mother    Sleep apnea Maternal Grandmother    Thyroid disease Mother, Maternal Grandmother          Social History     Socioeconomic History    Marital status: Single   Tobacco Use    Smoking status: Never     Passive exposure: Current    Smokeless tobacco: Never   Social History Narrative    Lives with mom, dad, and 1 sister. 1 dog       Review of Systems   Unable to perform ROS: Age       OBJECTIVE:     Vital Signs     There is no height or weight on file to calculate BMI.    Physical Exam:  Nursing note and vitals reviewed.    NAD  Awake, alert, comfortable  Regards, tracks  Babbles, no recognizable words  PERRL, EOMI, face symmetric  Moves all extremities well  R posterior valve pumps and refills easily    His shunt was interrogated and reset to 1.5    Diagnostic  Results:  Limited MRI brain was independently reviewed by me- continued interval decrease in ventricular size    ASSESSMENT/PLAN:     22 m.o. male with hydrocephalus who is now status post right parietal VPS (Strata @ 1.5) on 5/10/24.  His imaging is reassuring with progressive decrease in ventricular size and his current symptoms are unlikely due to shunt malfunction.   He may benefit from ENT evaluation given his frequent ear infections and his mother will discuss with his pediatrician.  Will follow clinically and with repeat imaging.  Red flags and warning signs of shunt malfunction that would warrant earlier evaluation in clinic or the ER were reviewed.    -follow up in 6 mo with repeat shunt imaging.        Note dictated with voice recognition software, please excuse any grammatical errors.           lightheaded or suddenly feel weak, confused, or restless.     · You have been given an epinephrine shot, even if you feel better. Call your doctor now or seek immediate medical care if:    · You have symptoms of an allergic reaction, such as:  ? A rash or hives (raised, red areas on the skin). ? Itching. ? Swelling. ? Belly pain, nausea, or vomiting. Watch closely for changes in your health, and be sure to contact your doctor if:    · You do not get better as expected. Where can you learn more? Go to https://Blueroof 360peSiriusXM [place].Interlace Medical. org and sign in to your lifecake account. Enter Z752 in the YourTeamOnline box to learn more about \"Allergies: Care Instructions. \"     If you do not have an account, please click on the \"Sign Up Now\" link. Current as of: June 29, 2020               Content Version: 12.6  © 0220-2952 [employer]. Care instructions adapted under license by [facility] ([facility]). If you have questions about a medical condition or this instruction, always ask your healthcare professional. [place] [address] any warranty or liability for your use of this information. Patient Education        Learning About Diabetes Food Guidelines  Your Care Instructions     Meal planning is important to manage diabetes. It helps keep your blood sugar at a target level (which you set with your doctor). You don't have to eat special foods. You can eat what your family eats, including sweets once in a while. But you do have to pay attention to how often you eat and how much you eat of certain foods. You may want to work with a dietitian or a certified diabetes educator (CDE) to help you plan meals and snacks. A dietitian or CDE can also help you lose weight if that is one of your goals. What should you know about eating carbs? Managing the amount of carbohydrate (carbs) you eat is an important part of healthy meals when you have diabetes.  Carbohydrate is found in many foods.  · Learn which foods have carbs. And learn the amounts of carbs in different foods. ? Bread, cereal, pasta, and rice have about 15 grams of carbs in a serving. A serving is 1 slice of bread (1 ounce), ½ cup of cooked cereal, or 1/3 cup of cooked pasta or rice. ? Fruits have 15 grams of carbs in a serving. A serving is 1 small fresh fruit, such as an apple or orange; ½ of a banana; ½ cup of cooked or canned fruit; ½ cup of fruit juice; 1 cup of melon or raspberries; or 2 tablespoons of dried fruit. ? Milk and no-sugar-added yogurt have 15 grams of carbs in a serving. A serving is 1 cup of milk or 2/3 cup of no-sugar-added yogurt. ? Starchy vegetables have 15 grams of carbs in a serving. A serving is ½ cup of mashed potatoes or sweet potato; 1 cup winter squash; ½ of a small baked potato; ½ cup of cooked beans; or ½ cup cooked corn or green peas. · Learn how much carbs to eat each day and at each meal. A dietitian or CDE can teach you how to keep track of the amount of carbs you eat. This is called carbohydrate counting. · If you are not sure how to count carbohydrate grams, use the Plate Method to plan meals. It is a good, quick way to make sure that you have a balanced meal. It also helps you spread carbs throughout the day. ? Divide your plate by types of foods. Put non-starchy vegetables on half the plate, meat or other protein food on one-quarter of the plate, and a grain or starchy vegetable in the final quarter of the plate. To this you can add a small piece of fruit and 1 cup of milk or yogurt, depending on how many carbs you are supposed to eat at a meal.  · Try to eat about the same amount of carbs at each meal. Do not \"save up\" your daily allowance of carbs to eat at one meal.  · Proteins have very little or no carbs per serving. Examples of proteins are beef, chicken, turkey, fish, eggs, tofu, cheese, cottage cheese, and peanut butter.  A serving size of meat is 3 ounces, which is about the size of a deck of cards. Examples of meat substitute serving sizes (equal to 1 ounce of meat) are 1/4 cup of cottage cheese, 1 egg, 1 tablespoon of peanut butter, and ½ cup of tofu. How can you eat out and still eat healthy? · Learn to estimate the serving sizes of foods that have carbohydrate. If you measure food at home, it will be easier to estimate the amount in a serving of restaurant food. · If the meal you order has too much carbohydrate (such as potatoes, corn, or baked beans), ask to have a low-carbohydrate food instead. Ask for a salad or green vegetables. · If you use insulin, check your blood sugar before and after eating out to help you plan how much to eat in the future. · If you eat more carbohydrate at a meal than you had planned, take a walk or do other exercise. This will help lower your blood sugar. What else should you know? · Limit saturated fat, such as the fat from meat and dairy products. This is a healthy choice because people who have diabetes are at higher risk of heart disease. So choose lean cuts of meat and nonfat or low-fat dairy products. Use olive or canola oil instead of butter or shortening when cooking. · Don't skip meals. Your blood sugar may drop too low if you skip meals and take insulin or certain medicines for diabetes. · Check with your doctor before you drink alcohol. Alcohol can cause your blood sugar to drop too low. Alcohol can also cause a bad reaction if you take certain diabetes medicines. Follow-up care is a key part of your treatment and safety. Be sure to make and go to all appointments, and call your doctor if you are having problems. It's also a good idea to know your test results and keep a list of the medicines you take. Where can you learn more? Go to https://chpekeenaewmarcel.healthDoodle Mobile. org and sign in to your Osmetech account. Enter S781 in the [facility] box to learn more about \"Learning About Diabetes Food Guidelines. \"     If you do not have an account, please click on the \"Sign Up Now\" link. Current as of: December 20, 2019               Content Version: 12.6  © 2006-2020 [employer]. Care instructions adapted under license by [facility] ([facility]). If you have questions about a medical condition or this instruction, always ask your healthcare professional. [address] any warranty or liability for your use of this information. Patient Education        DASH Diet: Care Instructions  Your Care Instructions     The DASH diet is an eating plan that can help lower your blood pressure. DASH stands for Dietary Approaches to Stop Hypertension. Hypertension is high blood pressure. The DASH diet focuses on eating foods that are high in calcium, potassium, and magnesium. These nutrients can lower blood pressure. The foods that are highest in these nutrients are fruits, vegetables, low-fat dairy products, nuts, seeds, and legumes. But taking calcium, potassium, and magnesium supplements instead of eating foods that are high in those nutrients does not have the same effect. The DASH diet also includes whole grains, fish, and poultry. The DASH diet is one of several lifestyle changes your doctor may recommend to lower your high blood pressure. Your doctor may also want you to decrease the amount of sodium in your diet. Lowering sodium while following the DASH diet can lower blood pressure even further than just the DASH diet alone. Follow-up care is a key part of your treatment and safety. Be sure to make and go to all appointments, and call your doctor if you are having problems. It's also a good idea to know your test results and keep a list of the medicines you take. How can you care for yourself at home? Following the DASH diet  · Eat 4 to 5 servings of fruit each day. A serving is 1 medium-sized piece of fruit, ½ cup chopped or canned fruit, 1/4 cup dried fruit, or 4 ounces (½ cup) of fruit juice.  Choose fruit more often than fruit juice. · Eat 4 to 5 servings of vegetables each day. A serving is 1 cup of lettuce or raw leafy vegetables, ½ cup of chopped or cooked vegetables, or 4 ounces (½ cup) of vegetable juice. Choose vegetables more often than vegetable juice. · Get 2 to 3 servings of low-fat and fat-free dairy each day. A serving is 8 ounces of milk, 1 cup of yogurt, or 1 ½ ounces of cheese. · Eat 6 to 8 servings of grains each day. A serving is 1 slice of bread, 1 ounce of dry cereal, or ½ cup of cooked rice, pasta, or cooked cereal. Try to choose whole-grain products as much as possible. · Limit lean meat, poultry, and fish to 2 servings each day. A serving is 3 ounces, about the size of a deck of cards. · Eat 4 to 5 servings of nuts, seeds, and legumes (cooked dried beans, lentils, and split peas) each week. A serving is 1/3 cup of nuts, 2 tablespoons of seeds, or ½ cup of cooked beans or peas. · Limit fats and oils to 2 to 3 servings each day. A serving is 1 teaspoon of vegetable oil or 2 tablespoons of salad dressing. · Limit sweets and added sugars to 5 servings or less a week. A serving is 1 tablespoon jelly or jam, ½ cup sorbet, or 1 cup of lemonade. · Eat less than 2,300 milligrams (mg) of sodium a day. If you limit your sodium to 1,500 mg a day, you can lower your blood pressure even more. Tips for success  · Start small. Do not try to make dramatic changes to your diet all at once. You might feel that you are missing out on your favorite foods and then be more likely to not follow the plan. Make small changes, and stick with them. Once those changes become habit, add a few more changes. · Try some of the following:  ? Make it a goal to eat a fruit or vegetable at every meal and at snacks. This will make it easy to get the recommended amount of fruits and vegetables each day. ? Try yogurt topped with fruit and nuts for a snack or healthy dessert.   ? Add lettuce, tomato, cucumber, and onion to sandwiches. ? Combine a ready-made pizza crust with low-fat mozzarella cheese and lots of vegetable toppings. Try using tomatoes, squash, spinach, broccoli, carrots, cauliflower, and onions. ? Have a variety of cut-up vegetables with a low-fat dip as an appetizer instead of chips and dip. ? Sprinkle sunflower seeds or chopped almonds over salads. Or try adding chopped walnuts or almonds to cooked vegetables. ? Try some vegetarian meals using beans and peas. Add garbanzo or kidney beans to salads. Make burritos and tacos with mashed bell beans or black beans. Where can you learn more? Go to https://Alicanto.Split. org and sign in to your [employer] account. Enter N664 in the TidePool box to learn more about \"DASH Diet: Care Instructions. \"     If you do not have an account, please click on the \"Sign Up Now\" link. Current as of: December 16, 2019               Content Version: 12.6  © 8815-6006 [facility]. Care instructions adapted under license by [facility] ([facility]). If you have questions about a medical condition or this instruction, always ask your healthcare professional. [place] [address] any warranty or liability for your use of this information. Patient Education        Gastroesophageal Reflux Disease (GERD): Care Instructions  Overview     Gastroesophageal reflux disease (GERD) is the backward flow of stomach acid into the esophagus. The esophagus is the tube that leads from your throat to your stomach. A one-way valve prevents the stomach acid from backing up into this tube. But when you have GERD, this valve does not close tightly enough. This can also cause pain and swelling in your esophagus. (This is called esophagitis.)  If you have mild GERD symptoms including heartburn, you may be able to control the problem with antacids or over-the-counter medicine. You can also make lifestyle changes to help reduce your symptoms.  These include changing your diet and eating habits, such as not eating late at night and losing weight. Follow-up care is a key part of your treatment and safety. Be sure to make and go to all appointments, and call your doctor if you are having problems. It's also a good idea to know your test results and keep a list of the medicines you take. How can you care for yourself at home? · Take your medicines exactly as prescribed. Call your doctor if you think you are having a problem with your medicine. · Your doctor may recommend over-the-counter medicine. For mild or occasional indigestion, antacids, such as Tums, Gaviscon, Mylanta, or Maalox, may help. Your doctor also may recommend over-the-counter acid reducers, such as famotidine (Pepcid AC), cimetidine (Tagamet HB), or omeprazole (Prilosec). Read and follow all instructions on the label. If you use these medicines often, talk with your doctor. · Change your eating habits. ? It's best to eat several small meals instead of two or three large meals. ? After you eat, wait 2 to 3 hours before you lie down. ? Chocolate, mint, and alcohol can make GERD worse. ? Spicy foods, foods that have a lot of acid (like tomatoes and oranges), and coffee can make GERD symptoms worse in some people. If your symptoms are worse after you eat a certain food, you may want to stop eating that food to see if your symptoms get better. · Do not smoke or chew tobacco. Smoking can make GERD worse. If you need help quitting, talk to your doctor about stop-smoking programs and medicines. These can increase your chances of quitting for good. · If you have GERD symptoms at night, raise the head of your bed 6 to 8 inches by putting the frame on blocks or placing a foam wedge under the head of your mattress. (Adding extra pillows does not work.)  · Do not wear tight clothing around your middle. · Lose weight if you need to. Losing just 5 to 10 pounds can help. When should you call for help?    Call your doctor now or seek immediate medical care if:    · You have new or different belly pain.     · Your stools are black and tarlike or have streaks of blood. Watch closely for changes in your health, and be sure to contact your doctor if:    · Your symptoms have not improved after 2 days.     · Food seems to catch in your throat or chest.   Where can you learn more? Go to https://LayarpeShrink Nanotechnologies.Virtual View App. org and sign in to your Mountain Al[employer] account. Enter C499 in the TalentSpring box to learn more about \"Gastroesophageal Reflux Disease (GERD): Care Instructions. \"     If you do not have an account, please click on the \"Sign Up Now\" link. Current as of: April 15, 2020               Content Version: 12.6  © 6100-6478 [employer]dala, [employer]. Care instructions adapted under license by [facility] ([facility]). If you have questions about a medical condition or this instruction, always ask your healthcare professional. [place] [address] any warranty or liability for your use of this information. Patient Education        High Cholesterol: Care Instructions  Your Care Instructions     Cholesterol is a type of fat in your blood. It is needed for many body functions, such as making new cells. Cholesterol is made by your body. It also comes from food you eat. High cholesterol means that you have too much of the fat in your blood. This raises your risk of a heart attack and stroke. LDL and HDL are part of your total cholesterol. LDL is the \"bad\" cholesterol. High LDL can raise your risk for heart disease, heart attack, and stroke. HDL is the \"good\" cholesterol. It helps clear bad cholesterol from the body. High HDL is linked with a lower risk of heart disease, heart attack, and stroke. Your cholesterol levels help your doctor find out your risk for having a heart attack or stroke. You and your doctor can talk about whether you need to lower your risk and what treatment is best for you.   A heart-healthy lifestyle along with medicines can help lower your cholesterol and your risk. The way you choose to lower your risk will depend on how high your risk is for heart attack and stroke. It will also depend on how you feel about taking medicines. Follow-up care is a key part of your treatment and safety. Be sure to make and go to all appointments, and call your doctor if you are having problems. It's also a good idea to know your test results and keep a list of the medicines you take. How can you care for yourself at home? · Eat a variety of foods every day. Good choices include fruits, vegetables, whole grains (like oatmeal), dried beans and peas, nuts and seeds, soy products (like tofu), and fat-free or low-fat dairy products. · Replace butter, margarine, and hydrogenated or partially hydrogenated oils with olive and canola oils. (Canola oil margarine without trans fat is fine.)  · Replace red meat with fish, poultry, and soy protein (like tofu). · Limit processed and packaged foods like chips, crackers, and cookies. · Bake, broil, or steam foods. Don't keene them. · Be physically active. Get at least 30 minutes of exercise on most days of the week. Walking is a good choice. You also may want to do other activities, such as running, swimming, cycling, or playing tennis or team sports. · Stay at a healthy weight or lose weight by making the changes in eating and physical activity listed above. Losing just a small amount of weight, even 5 to 10 pounds, can reduce your risk for having a heart attack or stroke. · Do not smoke. When should you call for help? Watch closely for changes in your health, and be sure to contact your doctor if:    · You need help making lifestyle changes.     · You have questions about your medicine. Where can you learn more? Go to https://[employer]ina.[employer]. org and sign in to your [employer] account.  Enter Y390 in the [employer] box to learn more supplement, such as Citrucel or Metamucil, every day if needed. Read and follow all instructions on the label. · Schedule time each day for a bowel movement. Having a daily routine may help. Take your time and do not strain when having a bowel movement. · Check with your doctor before you increase the amount of fiber in your diet. For some people who have IBS, eating more fiber may make some symptoms worse. This includes bloating. To reduce diarrhea  You may try giving up foods or drinks one at a time to see whether symptoms improve. Limit or avoid the following:  · Alcohol  · Caffeine, which is found in coffee, tea, cola drinks, and chocolate  · Nicotine, from smoking or chewing tobacco  · Gas-producing foods, such as beans, broccoli, cabbage, and apples  · Dairy products that contain lactose (milk sugar), such as ice cream and milk. · Foods and drinks high in sugar, especially fruit juice, soda, candy, and other packaged sweets (such as cookies)  · Foods high in fat, including rothman, sausage, butter, oils, and anything deep-fried  · Sorbitol and xylitol, artificial sweeteners found in some sugarless candies and chewing gum  Keep track of foods  · Some people with IBS use a daily food diary to keep track of what they eat and whether they have any symptoms after eating certain foods. The diary also can be a good way to record what is going on in your life. · Stress plays a role in IBS. So if you are aware that certain stresses bring on symptoms, you can try to reduce those stresses. Keep mealtimes pleasant  · Try to maintain a pleasant environment when you eat. This may reduce stress that can make symptoms likely to occur. · Give yourself plenty of time to eat, rather than eating on the go. Chew your food slowly. Try not to swallow air, which can cause bloating. Where can you learn more? Go to https://[employer]. org and sign in to your [employer] account.  Enter U042 in the [address]

## 2025-04-15 ENCOUNTER — TRANSCRIBE ORDERS (OUTPATIENT)
Dept: ADMINISTRATIVE | Age: 80
End: 2025-04-15

## 2025-04-15 DIAGNOSIS — N95.8 OTHER SPECIFIED MENOPAUSAL AND PERIMENOPAUSAL DISORDERS: Primary | ICD-10-CM

## 2025-05-14 NOTE — PROGRESS NOTES
SYSTEMS  The following systems were reviewed and revealed the following in addition to any already discussed in the HPI:  Review of Systems   Constitutional:  Negative for fatigue and fever.   HENT:  Positive for voice change. Negative for congestion, ear pain, postnasal drip, rhinorrhea, sneezing and trouble swallowing.    Eyes:  Negative for pain and visual disturbance.   Respiratory:  Negative for cough and shortness of breath.    Cardiovascular:  Negative for chest pain.   Gastrointestinal:  Negative for nausea and vomiting.   Endocrine: Negative.    Genitourinary: Negative.    Musculoskeletal:  Negative for neck pain and neck stiffness.   Skin:  Negative for rash.   Neurological:  Negative for dizziness and headaches.      PHYSICAL EXAM  /68   Pulse 63   Ht 1.6 m (5' 3\")   Wt 67.6 kg (149 lb)   BMI 26.39 kg/m²   GENERAL: No Acute Distress, Alert and Oriented, no hoarseness  EYES: EOMI, Anti-icteric  NOSE: No epistaxis, nasal mucosa within normal limits, no purulent drainage  EARS: Normal external canal appearance, EAC patent bilaterally, TM's intact bilaterally with no evidence of effusions   FACE: 1/6 House-Brackmann Scale, symmetric, sensation equal bilaterally  ORAL CAVITY: No masses or lesions palpated, uvula is midline, moist mucous membranes,   NECK: Normal range of motion, no thyromegaly, trachea is midline, no lymphadenopathy, no neck masses, no crepitus  CHEST: Normal respiratory effort, no retractions, breathing comfortably  SKIN: No rashes, normal appearing skin, no evidence of skin lesions/tumors  RADIOLOGY  Summary of findings:    PROCEDURE  Flexible Laryngoscopy  Procedure : Flexible Laryngoscopy  Surgeon: Arun Alvarenga DO  Anesthesia: Afrin with 4% lidocaine  Indication: Laryngeal mirror examination was not tolerated due to gag reflex  Description:  After verbal consent was obtained, the scope was passed along the floor of the left naris to the level of the larynx. There was no evidence

## 2025-05-19 ENCOUNTER — OFFICE VISIT (OUTPATIENT)
Dept: ENT CLINIC | Age: 80
End: 2025-05-19
Payer: MEDICARE

## 2025-05-19 ENCOUNTER — HOSPITAL ENCOUNTER (OUTPATIENT)
Dept: GENERAL RADIOLOGY | Age: 80
Discharge: HOME | End: 2025-05-19
Attending: FAMILY MEDICINE
Payer: MEDICARE

## 2025-05-19 VITALS
DIASTOLIC BLOOD PRESSURE: 68 MMHG | WEIGHT: 149 LBS | HEART RATE: 63 BPM | SYSTOLIC BLOOD PRESSURE: 117 MMHG | BODY MASS INDEX: 26.4 KG/M2 | HEIGHT: 63 IN

## 2025-05-19 DIAGNOSIS — N95.8 OTHER SPECIFIED MENOPAUSAL AND PERIMENOPAUSAL DISORDERS: ICD-10-CM

## 2025-05-19 DIAGNOSIS — J30.2 SEASONAL ALLERGIES: ICD-10-CM

## 2025-05-19 DIAGNOSIS — K21.9 LARYNGOPHARYNGEAL REFLUX (LPR): Primary | ICD-10-CM

## 2025-05-19 PROCEDURE — 77080 DXA BONE DENSITY AXIAL: CPT

## 2025-05-19 PROCEDURE — 1036F TOBACCO NON-USER: CPT | Performed by: STUDENT IN AN ORGANIZED HEALTH CARE EDUCATION/TRAINING PROGRAM

## 2025-05-19 PROCEDURE — 31575 DIAGNOSTIC LARYNGOSCOPY: CPT | Performed by: STUDENT IN AN ORGANIZED HEALTH CARE EDUCATION/TRAINING PROGRAM

## 2025-05-19 PROCEDURE — 1159F MED LIST DOCD IN RCRD: CPT | Performed by: STUDENT IN AN ORGANIZED HEALTH CARE EDUCATION/TRAINING PROGRAM

## 2025-05-19 PROCEDURE — 1123F ACP DISCUSS/DSCN MKR DOCD: CPT | Performed by: STUDENT IN AN ORGANIZED HEALTH CARE EDUCATION/TRAINING PROGRAM

## 2025-05-19 PROCEDURE — 99203 OFFICE O/P NEW LOW 30 MIN: CPT | Performed by: STUDENT IN AN ORGANIZED HEALTH CARE EDUCATION/TRAINING PROGRAM

## 2025-05-19 PROCEDURE — G8427 DOCREV CUR MEDS BY ELIG CLIN: HCPCS | Performed by: STUDENT IN AN ORGANIZED HEALTH CARE EDUCATION/TRAINING PROGRAM

## 2025-05-19 PROCEDURE — G8399 PT W/DXA RESULTS DOCUMENT: HCPCS | Performed by: STUDENT IN AN ORGANIZED HEALTH CARE EDUCATION/TRAINING PROGRAM

## 2025-05-19 PROCEDURE — 1160F RVW MEDS BY RX/DR IN RCRD: CPT | Performed by: STUDENT IN AN ORGANIZED HEALTH CARE EDUCATION/TRAINING PROGRAM

## 2025-05-19 PROCEDURE — 3078F DIAST BP <80 MM HG: CPT | Performed by: STUDENT IN AN ORGANIZED HEALTH CARE EDUCATION/TRAINING PROGRAM

## 2025-05-19 PROCEDURE — 3074F SYST BP LT 130 MM HG: CPT | Performed by: STUDENT IN AN ORGANIZED HEALTH CARE EDUCATION/TRAINING PROGRAM

## 2025-05-19 PROCEDURE — G8419 CALC BMI OUT NRM PARAM NOF/U: HCPCS | Performed by: STUDENT IN AN ORGANIZED HEALTH CARE EDUCATION/TRAINING PROGRAM

## 2025-05-19 PROCEDURE — 1090F PRES/ABSN URINE INCON ASSESS: CPT | Performed by: STUDENT IN AN ORGANIZED HEALTH CARE EDUCATION/TRAINING PROGRAM

## 2025-05-19 ASSESSMENT — ENCOUNTER SYMPTOMS
NAUSEA: 0
SHORTNESS OF BREATH: 0
TROUBLE SWALLOWING: 0
EYE PAIN: 0
COUGH: 0
VOMITING: 0
RHINORRHEA: 0
VOICE CHANGE: 1

## 2025-08-05 ENCOUNTER — OFFICE VISIT (OUTPATIENT)
Dept: ENT CLINIC | Age: 80
End: 2025-08-05
Payer: MEDICARE

## 2025-08-05 VITALS
SYSTOLIC BLOOD PRESSURE: 126 MMHG | BODY MASS INDEX: 26.4 KG/M2 | DIASTOLIC BLOOD PRESSURE: 74 MMHG | HEIGHT: 63 IN | WEIGHT: 149 LBS | HEART RATE: 65 BPM

## 2025-08-05 DIAGNOSIS — J30.2 SEASONAL ALLERGIES: ICD-10-CM

## 2025-08-05 DIAGNOSIS — R49.0 MUSCLE TENSION DYSPHONIA: ICD-10-CM

## 2025-08-05 DIAGNOSIS — K21.9 LARYNGOPHARYNGEAL REFLUX (LPR): Primary | ICD-10-CM

## 2025-08-05 PROCEDURE — 31575 DIAGNOSTIC LARYNGOSCOPY: CPT | Performed by: STUDENT IN AN ORGANIZED HEALTH CARE EDUCATION/TRAINING PROGRAM

## 2025-08-05 PROCEDURE — 99214 OFFICE O/P EST MOD 30 MIN: CPT | Performed by: STUDENT IN AN ORGANIZED HEALTH CARE EDUCATION/TRAINING PROGRAM

## 2025-08-05 PROCEDURE — G8419 CALC BMI OUT NRM PARAM NOF/U: HCPCS | Performed by: STUDENT IN AN ORGANIZED HEALTH CARE EDUCATION/TRAINING PROGRAM

## 2025-08-05 PROCEDURE — 3078F DIAST BP <80 MM HG: CPT | Performed by: STUDENT IN AN ORGANIZED HEALTH CARE EDUCATION/TRAINING PROGRAM

## 2025-08-05 PROCEDURE — 1090F PRES/ABSN URINE INCON ASSESS: CPT | Performed by: STUDENT IN AN ORGANIZED HEALTH CARE EDUCATION/TRAINING PROGRAM

## 2025-08-05 PROCEDURE — G8399 PT W/DXA RESULTS DOCUMENT: HCPCS | Performed by: STUDENT IN AN ORGANIZED HEALTH CARE EDUCATION/TRAINING PROGRAM

## 2025-08-05 PROCEDURE — G8427 DOCREV CUR MEDS BY ELIG CLIN: HCPCS | Performed by: STUDENT IN AN ORGANIZED HEALTH CARE EDUCATION/TRAINING PROGRAM

## 2025-08-05 PROCEDURE — 3074F SYST BP LT 130 MM HG: CPT | Performed by: STUDENT IN AN ORGANIZED HEALTH CARE EDUCATION/TRAINING PROGRAM

## 2025-08-05 PROCEDURE — 1036F TOBACCO NON-USER: CPT | Performed by: STUDENT IN AN ORGANIZED HEALTH CARE EDUCATION/TRAINING PROGRAM

## 2025-08-05 PROCEDURE — 1123F ACP DISCUSS/DSCN MKR DOCD: CPT | Performed by: STUDENT IN AN ORGANIZED HEALTH CARE EDUCATION/TRAINING PROGRAM

## 2025-08-05 PROCEDURE — 1159F MED LIST DOCD IN RCRD: CPT | Performed by: STUDENT IN AN ORGANIZED HEALTH CARE EDUCATION/TRAINING PROGRAM

## 2025-08-05 PROCEDURE — 1160F RVW MEDS BY RX/DR IN RCRD: CPT | Performed by: STUDENT IN AN ORGANIZED HEALTH CARE EDUCATION/TRAINING PROGRAM

## 2025-08-05 RX ORDER — OMEPRAZOLE 20 MG/1
20 CAPSULE, DELAYED RELEASE ORAL
Qty: 30 CAPSULE | Refills: 0 | Status: SHIPPED | OUTPATIENT
Start: 2025-08-05

## 2025-08-05 RX ORDER — AZELASTINE 1 MG/ML
1 SPRAY, METERED NASAL 2 TIMES DAILY
Qty: 30 ML | Refills: 3 | Status: SHIPPED | OUTPATIENT
Start: 2025-08-05

## 2025-08-27 DIAGNOSIS — K21.9 LARYNGOPHARYNGEAL REFLUX (LPR): ICD-10-CM

## 2025-08-28 RX ORDER — OMEPRAZOLE 20 MG/1
20 CAPSULE, DELAYED RELEASE ORAL
Qty: 90 CAPSULE | Refills: 1 | Status: SHIPPED | OUTPATIENT
Start: 2025-08-28

## 2025-09-03 ASSESSMENT — ENCOUNTER SYMPTOMS
VOMITING: 0
EYE PAIN: 0
RHINORRHEA: 0
SHORTNESS OF BREATH: 0
COUGH: 0
NAUSEA: 0
TROUBLE SWALLOWING: 0
VOICE CHANGE: 1

## 2025-09-04 ENCOUNTER — OFFICE VISIT (OUTPATIENT)
Dept: ENT CLINIC | Age: 80
End: 2025-09-04
Payer: MEDICARE

## 2025-09-04 ENCOUNTER — PROCEDURE VISIT (OUTPATIENT)
Dept: SPEECH THERAPY | Age: 80
End: 2025-09-04
Payer: MEDICARE

## 2025-09-04 VITALS
SYSTOLIC BLOOD PRESSURE: 130 MMHG | HEART RATE: 61 BPM | WEIGHT: 150 LBS | DIASTOLIC BLOOD PRESSURE: 76 MMHG | BODY MASS INDEX: 26.58 KG/M2 | HEIGHT: 63 IN

## 2025-09-04 DIAGNOSIS — K21.9 LARYNGOPHARYNGEAL REFLUX (LPR): ICD-10-CM

## 2025-09-04 DIAGNOSIS — R49.0 DYSPHONIA: Primary | ICD-10-CM

## 2025-09-04 DIAGNOSIS — R49.0 MUSCLE TENSION DYSPHONIA: ICD-10-CM

## 2025-09-04 DIAGNOSIS — R49.0 MUSCLE TENSION DYSPHONIA: Primary | ICD-10-CM

## 2025-09-04 DIAGNOSIS — J38.3 VOCAL FOLD ATROPHY: ICD-10-CM

## 2025-09-04 PROCEDURE — 1160F RVW MEDS BY RX/DR IN RCRD: CPT | Performed by: STUDENT IN AN ORGANIZED HEALTH CARE EDUCATION/TRAINING PROGRAM

## 2025-09-04 PROCEDURE — 99213 OFFICE O/P EST LOW 20 MIN: CPT | Performed by: STUDENT IN AN ORGANIZED HEALTH CARE EDUCATION/TRAINING PROGRAM

## 2025-09-04 PROCEDURE — 92507 TX SP LANG VOICE COMM INDIV: CPT | Performed by: SPEECH-LANGUAGE PATHOLOGIST

## 2025-09-04 PROCEDURE — 92524 BEHAVRAL QUALIT ANALYS VOICE: CPT | Performed by: SPEECH-LANGUAGE PATHOLOGIST

## 2025-09-04 PROCEDURE — G8427 DOCREV CUR MEDS BY ELIG CLIN: HCPCS | Performed by: STUDENT IN AN ORGANIZED HEALTH CARE EDUCATION/TRAINING PROGRAM

## 2025-09-04 PROCEDURE — G8419 CALC BMI OUT NRM PARAM NOF/U: HCPCS | Performed by: STUDENT IN AN ORGANIZED HEALTH CARE EDUCATION/TRAINING PROGRAM

## 2025-09-04 PROCEDURE — 3078F DIAST BP <80 MM HG: CPT | Performed by: STUDENT IN AN ORGANIZED HEALTH CARE EDUCATION/TRAINING PROGRAM

## 2025-09-04 PROCEDURE — 1090F PRES/ABSN URINE INCON ASSESS: CPT | Performed by: STUDENT IN AN ORGANIZED HEALTH CARE EDUCATION/TRAINING PROGRAM

## 2025-09-04 PROCEDURE — G8399 PT W/DXA RESULTS DOCUMENT: HCPCS | Performed by: STUDENT IN AN ORGANIZED HEALTH CARE EDUCATION/TRAINING PROGRAM

## 2025-09-04 PROCEDURE — 1123F ACP DISCUSS/DSCN MKR DOCD: CPT | Performed by: STUDENT IN AN ORGANIZED HEALTH CARE EDUCATION/TRAINING PROGRAM

## 2025-09-04 PROCEDURE — 1036F TOBACCO NON-USER: CPT | Performed by: STUDENT IN AN ORGANIZED HEALTH CARE EDUCATION/TRAINING PROGRAM

## 2025-09-04 PROCEDURE — 1159F MED LIST DOCD IN RCRD: CPT | Performed by: STUDENT IN AN ORGANIZED HEALTH CARE EDUCATION/TRAINING PROGRAM

## 2025-09-04 PROCEDURE — 31579 LARYNGOSCOPY TELESCOPIC: CPT | Performed by: SPEECH-LANGUAGE PATHOLOGIST

## 2025-09-04 PROCEDURE — 3075F SYST BP GE 130 - 139MM HG: CPT | Performed by: STUDENT IN AN ORGANIZED HEALTH CARE EDUCATION/TRAINING PROGRAM

## (undated) DEVICE — GAUZE,SPONGE,4"X4",16PLY,STRL,LF,10/TRAY: Brand: MEDLINE

## (undated) DEVICE — STERILE POLYISOPRENE POWDER-FREE SURGICAL GLOVES: Brand: PROTEXIS

## (undated) DEVICE — CHLORAPREP 26ML ORANGE

## (undated) DEVICE — TOWEL OR BLUEE 16X26IN ST 8 PACK ORB08 16X26ORTWL

## (undated) DEVICE — ALCOHOL RUBBING 16OZ 70% ISO

## (undated) DEVICE — UNIVERSAL BLOCK TRAY: Brand: MEDLINE INDUSTRIES, INC.